# Patient Record
Sex: MALE | Race: WHITE | Employment: OTHER | URBAN - METROPOLITAN AREA
[De-identification: names, ages, dates, MRNs, and addresses within clinical notes are randomized per-mention and may not be internally consistent; named-entity substitution may affect disease eponyms.]

---

## 2019-07-22 ENCOUNTER — HOSPITAL ENCOUNTER (OUTPATIENT)
Dept: CT IMAGING | Age: 69
Discharge: HOME | End: 2019-07-22
Payer: MEDICARE

## 2019-07-22 DIAGNOSIS — N50.1: ICD-10-CM

## 2019-07-22 LAB
GFR AFRICAN AMERICAN: >60
GFR NON-AFRICAN AMERICAN: >60
PERFORMED ON: NORMAL
POC CREATININE: 0.8 MG/DL (ref 0.8–1.3)
POC SAMPLE TYPE: NORMAL

## 2019-07-22 PROCEDURE — 82565 ASSAY OF CREATININE: CPT

## 2019-07-22 PROCEDURE — 6360000004 HC RX CONTRAST MEDICATION: Performed by: INTERNAL MEDICINE

## 2019-07-22 PROCEDURE — 74177 CT ABD & PELVIS W/CONTRAST: CPT

## 2019-07-22 RX ADMIN — IOHEXOL 50 ML: 240 INJECTION, SOLUTION INTRATHECAL; INTRAVASCULAR; INTRAVENOUS; ORAL at 14:19

## 2019-07-22 RX ADMIN — IOPAMIDOL 75 ML: 755 INJECTION, SOLUTION INTRAVENOUS at 14:19

## 2019-09-26 ENCOUNTER — TELEPHONE (OUTPATIENT)
Dept: FAMILY MEDICINE CLINIC | Age: 69
End: 2019-09-26

## 2019-10-16 ENCOUNTER — OFFICE VISIT (OUTPATIENT)
Dept: FAMILY MEDICINE CLINIC | Age: 69
End: 2019-10-16
Payer: MEDICARE

## 2019-10-16 VITALS
WEIGHT: 245.2 LBS | RESPIRATION RATE: 18 BRPM | TEMPERATURE: 98.6 F | DIASTOLIC BLOOD PRESSURE: 62 MMHG | SYSTOLIC BLOOD PRESSURE: 106 MMHG | BODY MASS INDEX: 39.41 KG/M2 | HEART RATE: 106 BPM | OXYGEN SATURATION: 97 % | HEIGHT: 66 IN

## 2019-10-16 DIAGNOSIS — L73.2 HIDRADENITIS: ICD-10-CM

## 2019-10-16 DIAGNOSIS — E78.2 MIXED HYPERLIPIDEMIA: ICD-10-CM

## 2019-10-16 DIAGNOSIS — Z86.010 HISTORY OF COLONOSCOPY WITH POLYPECTOMY: ICD-10-CM

## 2019-10-16 DIAGNOSIS — R73.03 PREDIABETES: ICD-10-CM

## 2019-10-16 DIAGNOSIS — Z23 NEEDS FLU SHOT: ICD-10-CM

## 2019-10-16 DIAGNOSIS — R35.0 BENIGN PROSTATIC HYPERPLASIA WITH URINARY FREQUENCY: ICD-10-CM

## 2019-10-16 DIAGNOSIS — Z98.890 HISTORY OF COLONOSCOPY WITH POLYPECTOMY: ICD-10-CM

## 2019-10-16 DIAGNOSIS — N40.1 BENIGN PROSTATIC HYPERPLASIA WITH URINARY FREQUENCY: ICD-10-CM

## 2019-10-16 DIAGNOSIS — I89.0 LYMPHEDEMA OF GENITALIA: ICD-10-CM

## 2019-10-16 DIAGNOSIS — I10 ESSENTIAL HYPERTENSION: Primary | ICD-10-CM

## 2019-10-16 DIAGNOSIS — Z72.0 TOBACCO USE: ICD-10-CM

## 2019-10-16 PROBLEM — Z86.0100 HISTORY OF COLONOSCOPY WITH POLYPECTOMY: Status: ACTIVE | Noted: 2019-10-16

## 2019-10-16 PROCEDURE — 4004F PT TOBACCO SCREEN RCVD TLK: CPT | Performed by: FAMILY MEDICINE

## 2019-10-16 PROCEDURE — G8417 CALC BMI ABV UP PARAM F/U: HCPCS | Performed by: FAMILY MEDICINE

## 2019-10-16 PROCEDURE — 90653 IIV ADJUVANT VACCINE IM: CPT | Performed by: FAMILY MEDICINE

## 2019-10-16 PROCEDURE — 3017F COLORECTAL CA SCREEN DOC REV: CPT | Performed by: FAMILY MEDICINE

## 2019-10-16 PROCEDURE — G0008 ADMIN INFLUENZA VIRUS VAC: HCPCS | Performed by: FAMILY MEDICINE

## 2019-10-16 PROCEDURE — G8427 DOCREV CUR MEDS BY ELIG CLIN: HCPCS | Performed by: FAMILY MEDICINE

## 2019-10-16 PROCEDURE — 4040F PNEUMOC VAC/ADMIN/RCVD: CPT | Performed by: FAMILY MEDICINE

## 2019-10-16 PROCEDURE — 99204 OFFICE O/P NEW MOD 45 MIN: CPT | Performed by: FAMILY MEDICINE

## 2019-10-16 PROCEDURE — 1123F ACP DISCUSS/DSCN MKR DOCD: CPT | Performed by: FAMILY MEDICINE

## 2019-10-16 PROCEDURE — G8482 FLU IMMUNIZE ORDER/ADMIN: HCPCS | Performed by: FAMILY MEDICINE

## 2019-10-16 RX ORDER — LOVASTATIN 40 MG/1
TABLET ORAL
COMMUNITY
Start: 2019-08-30 | End: 2020-05-14 | Stop reason: SDUPTHER

## 2019-10-16 RX ORDER — CLINDAMYCIN PHOSPHATE 10 MG/G
GEL TOPICAL
Qty: 1 BOTTLE | Refills: 2 | Status: SHIPPED | OUTPATIENT
Start: 2019-10-16 | End: 2019-10-23

## 2019-10-16 RX ORDER — LOSARTAN POTASSIUM 100 MG/1
100 TABLET ORAL
COMMUNITY
Start: 2018-12-10 | End: 2020-01-16 | Stop reason: SDUPTHER

## 2019-10-16 RX ORDER — TAMSULOSIN HYDROCHLORIDE 0.4 MG/1
0.4 CAPSULE ORAL DAILY
COMMUNITY
End: 2020-02-24 | Stop reason: SDUPTHER

## 2019-10-16 ASSESSMENT — ENCOUNTER SYMPTOMS
SORE THROAT: 0
DIARRHEA: 0
SINUS PRESSURE: 0
VOMITING: 0
SHORTNESS OF BREATH: 0
NAUSEA: 0
EYE REDNESS: 0
COUGH: 0
COLOR CHANGE: 0

## 2019-10-16 ASSESSMENT — PATIENT HEALTH QUESTIONNAIRE - PHQ9
2. FEELING DOWN, DEPRESSED OR HOPELESS: 0
SUM OF ALL RESPONSES TO PHQ9 QUESTIONS 1 & 2: 0
SUM OF ALL RESPONSES TO PHQ QUESTIONS 1-9: 0
SUM OF ALL RESPONSES TO PHQ QUESTIONS 1-9: 0
1. LITTLE INTEREST OR PLEASURE IN DOING THINGS: 0

## 2019-10-29 ENCOUNTER — TELEPHONE (OUTPATIENT)
Dept: FAMILY MEDICINE CLINIC | Age: 69
End: 2019-10-29

## 2019-10-29 RX ORDER — METHYLPREDNISOLONE 4 MG/1
TABLET ORAL
Qty: 1 KIT | Refills: 0 | Status: SHIPPED | OUTPATIENT
Start: 2019-10-29 | End: 2019-11-04

## 2019-10-31 ENCOUNTER — OFFICE VISIT (OUTPATIENT)
Dept: FAMILY MEDICINE CLINIC | Age: 69
End: 2019-10-31
Payer: MEDICARE

## 2019-10-31 VITALS
WEIGHT: 244 LBS | HEART RATE: 88 BPM | DIASTOLIC BLOOD PRESSURE: 60 MMHG | RESPIRATION RATE: 12 BRPM | OXYGEN SATURATION: 98 % | SYSTOLIC BLOOD PRESSURE: 120 MMHG | TEMPERATURE: 98 F | BODY MASS INDEX: 39.38 KG/M2

## 2019-10-31 DIAGNOSIS — M10.9 ACUTE GOUT OF RIGHT FOOT, UNSPECIFIED CAUSE: Primary | ICD-10-CM

## 2019-10-31 PROCEDURE — G8417 CALC BMI ABV UP PARAM F/U: HCPCS | Performed by: FAMILY MEDICINE

## 2019-10-31 PROCEDURE — G8482 FLU IMMUNIZE ORDER/ADMIN: HCPCS | Performed by: FAMILY MEDICINE

## 2019-10-31 PROCEDURE — 4040F PNEUMOC VAC/ADMIN/RCVD: CPT | Performed by: FAMILY MEDICINE

## 2019-10-31 PROCEDURE — G8427 DOCREV CUR MEDS BY ELIG CLIN: HCPCS | Performed by: FAMILY MEDICINE

## 2019-10-31 PROCEDURE — 99213 OFFICE O/P EST LOW 20 MIN: CPT | Performed by: FAMILY MEDICINE

## 2019-10-31 PROCEDURE — 1123F ACP DISCUSS/DSCN MKR DOCD: CPT | Performed by: FAMILY MEDICINE

## 2019-10-31 PROCEDURE — 3017F COLORECTAL CA SCREEN DOC REV: CPT | Performed by: FAMILY MEDICINE

## 2019-10-31 PROCEDURE — 4004F PT TOBACCO SCREEN RCVD TLK: CPT | Performed by: FAMILY MEDICINE

## 2019-11-12 ENCOUNTER — TELEPHONE (OUTPATIENT)
Dept: FAMILY MEDICINE CLINIC | Age: 69
End: 2019-11-12

## 2019-11-13 RX ORDER — INDOMETHACIN 50 MG/1
50 CAPSULE ORAL 2 TIMES DAILY WITH MEALS
Qty: 20 CAPSULE | Refills: 3 | Status: SHIPPED | OUTPATIENT
Start: 2019-11-13 | End: 2020-01-28 | Stop reason: SDUPTHER

## 2019-11-25 ENCOUNTER — TELEPHONE (OUTPATIENT)
Dept: FAMILY MEDICINE CLINIC | Age: 69
End: 2019-11-25

## 2019-11-25 ENCOUNTER — OFFICE VISIT (OUTPATIENT)
Dept: FAMILY MEDICINE CLINIC | Age: 69
End: 2019-11-25
Payer: MEDICARE

## 2019-11-25 VITALS
WEIGHT: 237 LBS | TEMPERATURE: 98.2 F | DIASTOLIC BLOOD PRESSURE: 60 MMHG | BODY MASS INDEX: 38.25 KG/M2 | SYSTOLIC BLOOD PRESSURE: 102 MMHG | HEART RATE: 94 BPM | RESPIRATION RATE: 12 BRPM | OXYGEN SATURATION: 96 %

## 2019-11-25 DIAGNOSIS — Z72.0 TOBACCO USE: ICD-10-CM

## 2019-11-25 DIAGNOSIS — J40 BRONCHITIS: Primary | ICD-10-CM

## 2019-11-25 PROBLEM — G47.33 OSA ON CPAP: Status: ACTIVE | Noted: 2019-11-25

## 2019-11-25 PROBLEM — Z99.89 OSA ON CPAP: Status: ACTIVE | Noted: 2019-11-25

## 2019-11-25 PROCEDURE — 3017F COLORECTAL CA SCREEN DOC REV: CPT | Performed by: NURSE PRACTITIONER

## 2019-11-25 PROCEDURE — G8417 CALC BMI ABV UP PARAM F/U: HCPCS | Performed by: NURSE PRACTITIONER

## 2019-11-25 PROCEDURE — 4040F PNEUMOC VAC/ADMIN/RCVD: CPT | Performed by: NURSE PRACTITIONER

## 2019-11-25 PROCEDURE — G8482 FLU IMMUNIZE ORDER/ADMIN: HCPCS | Performed by: NURSE PRACTITIONER

## 2019-11-25 PROCEDURE — 99213 OFFICE O/P EST LOW 20 MIN: CPT | Performed by: NURSE PRACTITIONER

## 2019-11-25 PROCEDURE — G8427 DOCREV CUR MEDS BY ELIG CLIN: HCPCS | Performed by: NURSE PRACTITIONER

## 2019-11-25 PROCEDURE — 4004F PT TOBACCO SCREEN RCVD TLK: CPT | Performed by: NURSE PRACTITIONER

## 2019-11-25 PROCEDURE — 1123F ACP DISCUSS/DSCN MKR DOCD: CPT | Performed by: NURSE PRACTITIONER

## 2019-11-25 RX ORDER — DEXTROMETHORPHAN HYDROBROMIDE AND PROMETHAZINE HYDROCHLORIDE 15; 6.25 MG/5ML; MG/5ML
5 SYRUP ORAL 4 TIMES DAILY PRN
Qty: 140 ML | Refills: 0 | Status: SHIPPED | OUTPATIENT
Start: 2019-11-25 | End: 2019-12-02

## 2019-11-25 RX ORDER — ALBUTEROL SULFATE 90 UG/1
2 AEROSOL, METERED RESPIRATORY (INHALATION) EVERY 4 HOURS PRN
Qty: 1 INHALER | Refills: 0 | Status: SHIPPED | OUTPATIENT
Start: 2019-11-25 | End: 2021-11-09 | Stop reason: ALTCHOICE

## 2019-11-25 RX ORDER — DOXYCYCLINE HYCLATE 100 MG
100 TABLET ORAL 2 TIMES DAILY
Qty: 20 TABLET | Refills: 0 | Status: SHIPPED | OUTPATIENT
Start: 2019-11-25 | End: 2019-12-05

## 2019-11-25 ASSESSMENT — ENCOUNTER SYMPTOMS
SINUS PRESSURE: 0
CHEST TIGHTNESS: 0
WHEEZING: 0
NAUSEA: 0
SHORTNESS OF BREATH: 0
SINUS PAIN: 0
COUGH: 1
DIARRHEA: 0
VOMITING: 0
SORE THROAT: 0
RHINORRHEA: 1

## 2020-01-14 DIAGNOSIS — R73.03 PREDIABETES: ICD-10-CM

## 2020-01-14 DIAGNOSIS — E78.2 MIXED HYPERLIPIDEMIA: ICD-10-CM

## 2020-01-14 DIAGNOSIS — R35.0 BENIGN PROSTATIC HYPERPLASIA WITH URINARY FREQUENCY: ICD-10-CM

## 2020-01-14 DIAGNOSIS — N40.1 BENIGN PROSTATIC HYPERPLASIA WITH URINARY FREQUENCY: ICD-10-CM

## 2020-01-14 DIAGNOSIS — I10 ESSENTIAL HYPERTENSION: ICD-10-CM

## 2020-01-14 LAB
A/G RATIO: 1.8 (ref 1.1–2.2)
ALBUMIN SERPL-MCNC: 4.4 G/DL (ref 3.4–5)
ALP BLD-CCNC: 75 U/L (ref 40–129)
ALT SERPL-CCNC: 11 U/L (ref 10–40)
ANION GAP SERPL CALCULATED.3IONS-SCNC: 16 MMOL/L (ref 3–16)
AST SERPL-CCNC: 11 U/L (ref 15–37)
BILIRUB SERPL-MCNC: 0.4 MG/DL (ref 0–1)
BUN BLDV-MCNC: 12 MG/DL (ref 7–20)
CALCIUM SERPL-MCNC: 9.4 MG/DL (ref 8.3–10.6)
CHLORIDE BLD-SCNC: 104 MMOL/L (ref 99–110)
CHOLESTEROL, FASTING: 138 MG/DL (ref 0–199)
CO2: 20 MMOL/L (ref 21–32)
CREAT SERPL-MCNC: 0.8 MG/DL (ref 0.8–1.3)
GFR AFRICAN AMERICAN: >60
GFR NON-AFRICAN AMERICAN: >60
GLOBULIN: 2.5 G/DL
GLUCOSE BLD-MCNC: 108 MG/DL (ref 70–99)
HDLC SERPL-MCNC: 35 MG/DL (ref 40–60)
LDL CHOLESTEROL CALCULATED: 71 MG/DL
POTASSIUM SERPL-SCNC: 4.5 MMOL/L (ref 3.5–5.1)
PROSTATE SPECIFIC ANTIGEN: 3.96 NG/ML (ref 0–4)
SODIUM BLD-SCNC: 140 MMOL/L (ref 136–145)
TOTAL PROTEIN: 6.9 G/DL (ref 6.4–8.2)
TRIGLYCERIDE, FASTING: 161 MG/DL (ref 0–150)
VLDLC SERPL CALC-MCNC: 32 MG/DL

## 2020-01-15 LAB
ESTIMATED AVERAGE GLUCOSE: 122.6 MG/DL
HBA1C MFR BLD: 5.9 %

## 2020-01-16 ENCOUNTER — OFFICE VISIT (OUTPATIENT)
Dept: FAMILY MEDICINE CLINIC | Age: 70
End: 2020-01-16
Payer: MEDICARE

## 2020-01-16 VITALS
TEMPERATURE: 97.4 F | OXYGEN SATURATION: 98 % | DIASTOLIC BLOOD PRESSURE: 60 MMHG | HEART RATE: 68 BPM | WEIGHT: 228.4 LBS | RESPIRATION RATE: 18 BRPM | SYSTOLIC BLOOD PRESSURE: 112 MMHG | BODY MASS INDEX: 36.71 KG/M2 | HEIGHT: 66 IN

## 2020-01-16 PROBLEM — E66.812 CLASS 2 SEVERE OBESITY DUE TO EXCESS CALORIES WITH SERIOUS COMORBIDITY AND BODY MASS INDEX (BMI) OF 36.0 TO 36.9 IN ADULT: Status: ACTIVE | Noted: 2020-01-16

## 2020-01-16 PROBLEM — E66.01 CLASS 2 SEVERE OBESITY DUE TO EXCESS CALORIES WITH SERIOUS COMORBIDITY AND BODY MASS INDEX (BMI) OF 36.0 TO 36.9 IN ADULT (HCC): Status: ACTIVE | Noted: 2020-01-16

## 2020-01-16 PROCEDURE — G0296 VISIT TO DETERM LDCT ELIG: HCPCS | Performed by: FAMILY MEDICINE

## 2020-01-16 PROCEDURE — 90732 PPSV23 VACC 2 YRS+ SUBQ/IM: CPT | Performed by: FAMILY MEDICINE

## 2020-01-16 PROCEDURE — G8417 CALC BMI ABV UP PARAM F/U: HCPCS | Performed by: FAMILY MEDICINE

## 2020-01-16 PROCEDURE — 99406 BEHAV CHNG SMOKING 3-10 MIN: CPT | Performed by: FAMILY MEDICINE

## 2020-01-16 PROCEDURE — 99214 OFFICE O/P EST MOD 30 MIN: CPT | Performed by: FAMILY MEDICINE

## 2020-01-16 PROCEDURE — G8427 DOCREV CUR MEDS BY ELIG CLIN: HCPCS | Performed by: FAMILY MEDICINE

## 2020-01-16 PROCEDURE — 1123F ACP DISCUSS/DSCN MKR DOCD: CPT | Performed by: FAMILY MEDICINE

## 2020-01-16 PROCEDURE — G0438 PPPS, INITIAL VISIT: HCPCS | Performed by: FAMILY MEDICINE

## 2020-01-16 PROCEDURE — G0009 ADMIN PNEUMOCOCCAL VACCINE: HCPCS | Performed by: FAMILY MEDICINE

## 2020-01-16 PROCEDURE — 4004F PT TOBACCO SCREEN RCVD TLK: CPT | Performed by: FAMILY MEDICINE

## 2020-01-16 PROCEDURE — 4040F PNEUMOC VAC/ADMIN/RCVD: CPT | Performed by: FAMILY MEDICINE

## 2020-01-16 PROCEDURE — 3017F COLORECTAL CA SCREEN DOC REV: CPT | Performed by: FAMILY MEDICINE

## 2020-01-16 PROCEDURE — G8482 FLU IMMUNIZE ORDER/ADMIN: HCPCS | Performed by: FAMILY MEDICINE

## 2020-01-16 PROCEDURE — G0447 BEHAVIOR COUNSEL OBESITY 15M: HCPCS | Performed by: FAMILY MEDICINE

## 2020-01-16 RX ORDER — PREDNISONE 10 MG/1
TABLET ORAL
Qty: 20 TABLET | Refills: 0 | Status: SHIPPED | OUTPATIENT
Start: 2020-01-16 | End: 2020-01-26

## 2020-01-16 RX ORDER — LOSARTAN POTASSIUM 50 MG/1
50 TABLET ORAL DAILY
Qty: 90 TABLET | Refills: 1 | Status: SHIPPED | OUTPATIENT
Start: 2020-01-16 | End: 2020-11-02

## 2020-01-16 ASSESSMENT — PATIENT HEALTH QUESTIONNAIRE - PHQ9
SUM OF ALL RESPONSES TO PHQ QUESTIONS 1-9: 0
SUM OF ALL RESPONSES TO PHQ QUESTIONS 1-9: 0

## 2020-01-16 ASSESSMENT — ENCOUNTER SYMPTOMS
WHEEZING: 0
COUGH: 1
SHORTNESS OF BREATH: 0

## 2020-01-16 NOTE — PROGRESS NOTES
Medicare Annual Wellness Visit  Name: Grant Cronin Date: 2020   MRN: <[id number]> Sex: Male   Age: 77 y.o. Ethnicity: Non-/Non    : 1950 Race: Leroy Johnson is here for Medicare AWV (Pt is here for his AWV ) and Shoulder Pain (pt c/o shoulder pain and elbow pain right and left side )    Screenings for behavioral, psychosocial and functional/safety risks, and cognitive dysfunction are all negative except as indicated below. These results, as well as other patient data from the [address] form, are documented in Flowsheets linked to this Encounter. Allergies   Allergen Reactions    Allopurinol Rash         Prior to Visit Medications    Medication Sig Taking? Authorizing Provider   losartan (COZAAR) 50 MG tablet Take 1 tablet by mouth daily Yes Jose Bernard MD   predniSONE (DELTASONE) 10 MG tablet Take 4 tabs for 2 days, then 3 tabs for 2 days, then 2 tabs for 2 days, then 1 tab for 2 days.  Yes Amy Huerta MD   albuterol sulfate HFA (VENTOLIN HFA) 108 (90 Base) MCG/ACT inhaler Inhale 2 puffs into the lungs every 4 hours as needed for Wheezing or Shortness of Breath Yes JAYRO Suarez - CNP   lovastatin (MEVACOR) 40 MG tablet  Yes Historical Provider, MD   tamsulosin (FLOMAX) 0.4 MG capsule Take 0.4 mg by mouth daily Yes Historical Provider, MD   indomethacin (INDOCIN) 50 MG capsule Take 1 capsule by mouth 2 times daily (with meals) for 10 days  Patient not taking: Reported on 2019  Jose Bernard MD         Past Medical History:   Diagnosis Date    Hydradenitis     Lymphedema        Past Surgical History:   Procedure Laterality Date    BACK SURGERY           Family History   Problem Relation Age of Onset    Heart Disease Mother     Rheum Arthritis Mother        CareTeam (Including outside providers/suppliers regularly involved in providing care):   Patient Care Team:  Jose Bernard MD as PCP - General (Family Medicine)  Jose Bernard MD as PCP - [facility] Empaneled Provider    Wt Readings from Last 3 Encounters:   01/16/20 228 lb 6.4 oz (103.6 kg)   11/25/19 237 lb (107.5 kg)   10/31/19 244 lb (110.7 kg)     Vitals:    01/16/20 1454   BP: 112/60   Site: Left Upper Arm   Position: Sitting   Cuff Size: Large Adult   Pulse: 68   Resp: 18   Temp: 97.4 °F (36.3 °C)   TempSrc: Tympanic   SpO2: 98%   Weight: 228 lb 6.4 oz (103.6 kg)   Height: 5' 6\" (1.676 m)     Body mass index is 36.86 kg/m². Based upon direct observation of the patient, evaluation of cognition reveals recent and remote memory intact. Patient's complete Health Risk Assessment and screening values have been reviewed and are found in Flowsheets. The following problems were reviewed today and where indicated follow up appointments were made and/or referrals ordered. Positive Risk Factor Screenings with Interventions:     Substance Abuse:  Social History     Tobacco History     Smoking Status  Current Every Day Smoker Smoking Start Date  1/1/1967 Smoking Frequency  1.5 packs/day for 50 years (76 pk yrs) Smoking Tobacco Type  Cigarettes    Smokeless Tobacco Use  Never Used          Alcohol History     Alcohol Use Status  Yes          Drug Use     Drug Use Status  Never          Sexual Activity     Sexually Active  Not Asked                  Substance Abuse Interventions:  · Tobacco abuse:  tobacco cessation tips and resources provided    Health Habits/Nutrition:     Body mass index is 36.86 kg/m².   Health Habits/Nutrition Interventions:  · Nutritional issues:  educational materials for healthy, well-balanced diet provided    Personalized Preventive Plan   Current Health Maintenance Status  Immunization History   Administered Date(s) Administered    Influenza, Triv, inactivated, subunit, adjuvanted, IM (Fluad 65 yrs and older) 10/16/2019    Pneumococcal Conjugate 13-valent ([id number]) 10/14/2017    Pneumococcal Polysaccharide ([id number]) 01/16/2020    Td, unspecified formulation 01/06/1997    Tdap (Boostrix, Adacel) 03/19/2015    Zoster Live (Zostavax) 01/15/2014        Health Maintenance   Topic Date Due    Hepatitis C screen  1950    Low dose CT lung screening  08/04/2006    Shingles Vaccine (2 of 3) 03/12/2014    Annual Wellness Visit (AWV)  10/24/2019    A1C test (Diabetic or Prediabetic)  01/14/2021    Lipid screen  01/14/2021    Potassium monitoring  01/14/2021    Creatinine monitoring  01/14/2021    DTaP/Tdap/Td vaccine (2 - Td) 03/19/2025    Colon cancer screen colonoscopy  08/10/2028    Flu vaccine  Completed    Pneumococcal 65+ years Vaccine  Completed    AAA screen  Completed     Recommendations for Preventive Services Due: see orders and patient instructions/AVS.  . Recommended screening schedule for the next 5-10 years is provided to the patient in written form: see Patient Instructions/AVS.    Juan Carlos Tanner was seen today for medicare awv and shoulder pain. Diagnoses and all orders for this visit:    Routine general medical examination at a health care facility    Essential hypertension  -     losartan (COZAAR) 50 MG tablet; Take 1 tablet by mouth daily    Class 2 severe obesity due to excess calories with serious comorbidity and body mass index (BMI) of 36.0 to 36.9 in adult [facility])  -     [place] Behavior  obesity 15m []    Light headedness    Cough    Elbow swelling, right  -     predniSONE (DELTASONE) 10 MG tablet; Take 4 tabs for 2 days, then 3 tabs for 2 days, then 2 tabs for 2 days, then 1 tab for 2 days. Lymphedema of genitalia  -     AFL - Ofelia Zapien DO, [facility], [facility]    Tobacco use    Personal history of tobacco use, presenting hazards to health  -     [place] TOBACCO USE CESSATION INTERMEDIATE 3-10 MINUTES [34945]    Personal history of tobacco use  -     [place] VISIT TO DISCUSS LUNG CA SCREEN W LDCT []  -     CT Lung Screening;  Future    Need for prophylactic vaccination against Streptococcus pneumoniae (pneumococcus)  -     Pneumococcal polysaccharide vaccine 23-valent PPSV23

## 2020-01-16 NOTE — PATIENT INSTRUCTIONS
Learning About Benefits From Quitting Smoking  How does quitting smoking make you healthier? If you're thinking about quitting smoking, you may have a few reasons to be smoke-free. Your health may be one of them. · When you quit smoking, you lower your risks for cancer, lung disease, heart attack, stroke, blood vessel disease, and blindness from macular degeneration. · When you're smoke-free, you get sick less often, and you heal faster. You are less likely to get colds, flu, bronchitis, and pneumonia. · As a nonsmoker, you may find that your mood is better and you are less stressed. When and how will you feel healthier? Quitting has real health benefits that start from day 1 of being smoke-free. And the longer you stay smoke-free, the healthier you get and the better you feel. The first hours  · After just 20 minutes, your blood pressure and heart rate go down. That means there's less stress on your heart and blood vessels. · Within 12 hours, the level of carbon monoxide in your blood drops back to normal. That makes room for more oxygen. With more oxygen in your body, you may notice that you have more energy than when you smoked. After 2 weeks  · Your lungs start to work better. · Your risk of heart attack starts to drop. After 1 month  · When your lungs are clear, you cough less and breathe deeper, so it's easier to be active. · Your sense of taste and smell return. That means you can enjoy food more than you have since you started smoking. Over the years  · After 1 year, your risk of heart disease is half what it would be if you kept smoking. · After 5 years, your risk of stroke starts to shrink. Within a few years after that, it's about the same as if you'd never smoked. · After 10 years, your risk of dying from lung cancer is cut by about half. And your risk for many other types of cancer is lower too. How would quitting help others in your life?   When you quit smoking, you improve the health of everyone who now breathes in your smoke. · Their heart, lung, and cancer risks drop, much like yours. · They are sick less. For babies and small children, living smoke-free means they're less likely to have ear infections, pneumonia, and bronchitis. · If you're a woman who is or will be pregnant someday, quitting smoking means a healthier . · Children who are close to you are less likely to become adult smokers. Where can you learn more? Go to https://[employer]. org and sign in to your [employer] account. Enter [phone] in the Webspy box to learn more about \"Learning About Benefits From Quitting Smoking. \"     If you do not have an account, please click on the \"Sign Up Now\" link. Current as of: 2019  Content Version: 12.3  © 5704-8887 Healthwise, [employer]. Care instructions adapted under license by [facility] ([facility]). If you have questions about a medical condition or this instruction, always ask your healthcare professional. [place] [address] any warranty or liability for your use of this information. Personalized Preventive Plan for Briana Mcqueen - 2020  Medicare offers a range of preventive health benefits. Some of the tests and screenings are paid in full while other may be subject to a deductible, co-insurance, and/or copay. Some of these benefits include a comprehensive review of your medical history including lifestyle, illnesses that may run in your family, and various assessments and screenings as appropriate. After reviewing your medical record and screening and assessments performed today your provider may have ordered immunizations, labs, imaging, and/or referrals for you. A list of these orders (if applicable) as well as your Preventive Care list are included within your After Visit Summary for your review.     Other Preventive Recommendations:    · A preventive eye exam performed by an eye specialist is

## 2020-01-16 NOTE — PROGRESS NOTES
Relationship status: None    Intimate partner violence:     Fear of current or ex partner: None     Emotionally abused: None     Physically abused: None     Forced sexual activity: None   Other Topics Concern    None   Social History Narrative    Originally from the area    Retired from [employer] work    Two boys, 6 grandchildren    Lives with wife, Ivory Sjogren     Allergies   Allergen Reactions    Allopurinol Rash       LABS:  Lab Results   Component Value Date    GLUCOSE 108 (H) 01/14/2020     Lab Results   Component Value Date     01/14/2020    K 4.5 01/14/2020    CREATININE 0.8 01/14/2020     LDL Calculated   Date Value Ref Range Status   01/14/2020 71 <100 mg/dL Final     HDL   Date Value Ref Range Status   01/14/2020 35 (L) 40 - 60 mg/dL Final   No results found for: TRIG  Lab Results   Component Value Date    ALT 11 01/14/2020    AST 11 (L) 01/14/2020    ALKPHOS 75 01/14/2020    BILITOT 0.4 01/14/2020   No results found for: WBC, HGB, HCT, MCV, PLT  No results found for: TSH  Lab Results   Component Value Date    LABA1C 5.9 01/14/2020     Lab Results   Component Value Date    PSA 3.96 01/14/2020         PHYSICAL EXAM  /60 (Site: Left Upper Arm, Position: Sitting, Cuff Size: Large Adult)   Pulse 68   Temp 97.4 °F (36.3 °C) (Tympanic)   Resp 18   Ht 5' 6\" (1.676 m)   Wt 228 lb 6.4 oz (103.6 kg)   SpO2 98%   BMI 36.86 kg/m²     BP Readings from Last 5 Encounters:   01/16/20 112/60   11/25/19 102/60   10/31/19 120/60   10/16/19 106/62       Wt Readings from Last 5 Encounters:   01/16/20 228 lb 6.4 oz (103.6 kg)   11/25/19 237 lb (107.5 kg)   10/31/19 244 lb (110.7 kg)   10/16/19 245 lb 3.2 oz (111.2 kg)         Physical Exam  Constitutional:       Appearance: He is well-developed. HENT:      Head: Normocephalic and atraumatic. Neck:      Musculoskeletal: Normal range of motion. Cardiovascular:      Rate and Rhythm: Normal rate and regular rhythm.       Heart sounds: Normal heart sounds. Pulmonary:      Effort: Pulmonary effort is normal.      Breath sounds: Normal breath sounds. Musculoskeletal: Normal range of motion. Comments: L elbow joint with mild swelling without erythema. +TTP   Skin:     General: Skin is warm and dry. Findings: No rash. Neurological:      Mental Status: He is alert and oriented to person, place, and time. Deep Tendon Reflexes: Reflexes are normal and symmetric. ASSESSMENT/PLAN:  1. Routine general medical examination at a health care facility    2. Essential hypertension  He has been lightheaded and his blood pressure is low normal so we will decrease his losartan from 100 to 50 mg  - losartan (COZAAR) 50 MG tablet; Take 1 tablet by mouth daily  Dispense: 90 tablet; Refill: 1    3. Class 2 severe obesity due to excess calories with serious comorbidity and body mass index (BMI) of 36.0 to 36.9 in [facility])  We reviewed this in detail. Congratulated patient on 17 pound weight loss over the last 3 months  - [place] Behavior  obesity 15m []    4. Light headedness  This is due to his blood pressure medication likely in light of his weight loss needs less medicine so we will decrease the dose    5. Cough  He has a normal lung exam.  This is likely post viral in nature. It is improving. I asked him to contact me if it persists for more than 4 to 6 weeks beyond today    6. Elbow swelling, right  It is unclear if this is gout related to a minor trauma. It does not appear to be a true bursitis as it is more proximal.  Will use prednisone. He does not have symptoms concerning for infectious cause, however I did ask him to contact us if symptoms worsen or fail to improve on the prednisone  - predniSONE (DELTASONE) 10 MG tablet; Take 4 tabs for 2 days, then 3 tabs for 2 days, then 2 tabs for 2 days, then 1 tab for 2 days. Dispense: 20 tablet; Refill: 0    7.  Lymphedema of genitalia  - AFL - Rupali, Cedrick Victor DO, [facility], [facility]    8. Tobacco use  Discussed cessation at length. He has been on patches in the past.  He is not ready today    9. Personal history of tobacco use, presenting hazards to health  After reviewing potential risks and benefits of lung cancer screening including false positives he would like to opt for screening  - [place] TOBACCO USE CESSATION INTERMEDIATE 3-10 MINUTES [58384]    10. Personal history of tobacco use  - [place] VISIT TO DISCUSS LUNG CA SCREEN W LDCT []  - CT Lung Screening; Future    11. Need for prophylactic vaccination against Streptococcus pneumoniae (pneumococcus)  - Pneumococcal polysaccharide vaccine 23-valent PPSV23      Return in about 6 months (around 7/16/2020) for htn f/u.

## 2020-01-27 ENCOUNTER — TELEPHONE (OUTPATIENT)
Dept: FAMILY MEDICINE CLINIC | Age: 70
End: 2020-01-27

## 2020-01-27 NOTE — TELEPHONE ENCOUNTER
Pt called stating that he was seen last week for a physical  At that appt he had a swollen elbow   Now knuckle  is swollen and elbow still hurts   He was given prednisone 10mg  He is done taking that.   It only helped a little but the pain is back  Is there something else that the pt can take to help with this  Pharm is confirmed at [facility] on [address]    Please advise

## 2020-01-27 NOTE — TELEPHONE ENCOUNTER
If not improving he would likely benefit from an eval from Ortho. Since the prednisone didn't help I'm not sure further medicine will do the trick. Referral for Dr. Efren Nieves placed. Thanks.

## 2020-01-28 ENCOUNTER — HOSPITAL ENCOUNTER (OUTPATIENT)
Dept: CT IMAGING | Age: 70
Discharge: HOME | End: 2020-01-28
Payer: MEDICARE

## 2020-01-28 ENCOUNTER — OFFICE VISIT (OUTPATIENT)
Dept: ORTHOPEDIC SURGERY | Age: 70
End: 2020-01-28
Payer: MEDICARE

## 2020-01-28 VITALS
DIASTOLIC BLOOD PRESSURE: 61 MMHG | HEIGHT: 66 IN | SYSTOLIC BLOOD PRESSURE: 107 MMHG | HEART RATE: 81 BPM | WEIGHT: 225 LBS | TEMPERATURE: 98.9 F | BODY MASS INDEX: 36.16 KG/M2

## 2020-01-28 DIAGNOSIS — M79.644 PAIN OF FINGER OF RIGHT HAND: ICD-10-CM

## 2020-01-28 LAB
BASOPHILS ABSOLUTE: 0.1 K/UL (ref 0–0.2)
BASOPHILS RELATIVE PERCENT: 0.4 %
EOSINOPHILS ABSOLUTE: 0.2 K/UL (ref 0–0.6)
EOSINOPHILS RELATIVE PERCENT: 1 %
HCT VFR BLD CALC: 41 % (ref 40.5–52.5)
HEMOGLOBIN: 13.3 G/DL (ref 13.5–17.5)
LYMPHOCYTES ABSOLUTE: 3.8 K/UL (ref 1–5.1)
LYMPHOCYTES RELATIVE PERCENT: 24.9 %
MCH RBC QN AUTO: 28.7 PG (ref 26–34)
MCHC RBC AUTO-ENTMCNC: 32.6 G/DL (ref 31–36)
MCV RBC AUTO: 88.2 FL (ref 80–100)
MONOCYTES ABSOLUTE: 1 K/UL (ref 0–1.3)
MONOCYTES RELATIVE PERCENT: 6.7 %
NEUTROPHILS ABSOLUTE: 10.2 K/UL (ref 1.7–7.7)
NEUTROPHILS RELATIVE PERCENT: 67 %
PDW BLD-RTO: 15.9 % (ref 12.4–15.4)
PLATELET # BLD: 260 K/UL (ref 135–450)
PMV BLD AUTO: 9.6 FL (ref 5–10.5)
RBC # BLD: 4.64 M/UL (ref 4.2–5.9)
URIC ACID, SERUM: 7.4 MG/DL (ref 3.5–7.2)
WBC # BLD: 15.3 K/UL (ref 4–11)

## 2020-01-28 PROCEDURE — G0297 LDCT FOR LUNG CA SCREEN: HCPCS

## 2020-01-28 PROCEDURE — 3017F COLORECTAL CA SCREEN DOC REV: CPT | Performed by: PHYSICIAN ASSISTANT

## 2020-01-28 PROCEDURE — G8417 CALC BMI ABV UP PARAM F/U: HCPCS | Performed by: PHYSICIAN ASSISTANT

## 2020-01-28 PROCEDURE — G8482 FLU IMMUNIZE ORDER/ADMIN: HCPCS | Performed by: PHYSICIAN ASSISTANT

## 2020-01-28 PROCEDURE — 1123F ACP DISCUSS/DSCN MKR DOCD: CPT | Performed by: PHYSICIAN ASSISTANT

## 2020-01-28 PROCEDURE — G8427 DOCREV CUR MEDS BY ELIG CLIN: HCPCS | Performed by: PHYSICIAN ASSISTANT

## 2020-01-28 PROCEDURE — 4004F PT TOBACCO SCREEN RCVD TLK: CPT | Performed by: PHYSICIAN ASSISTANT

## 2020-01-28 PROCEDURE — 99202 OFFICE O/P NEW SF 15 MIN: CPT | Performed by: PHYSICIAN ASSISTANT

## 2020-01-28 PROCEDURE — 4040F PNEUMOC VAC/ADMIN/RCVD: CPT | Performed by: PHYSICIAN ASSISTANT

## 2020-01-28 RX ORDER — INDOMETHACIN 50 MG/1
50 CAPSULE ORAL 2 TIMES DAILY WITH MEALS
Qty: 20 CAPSULE | Refills: 3 | Status: SHIPPED | OUTPATIENT
Start: 2020-01-28 | End: 2020-04-15

## 2020-01-29 PROBLEM — M10.021 ACUTE IDIOPATHIC GOUT OF RIGHT ELBOW: Status: ACTIVE | Noted: 2020-01-29

## 2020-01-29 PROBLEM — M79.644 PAIN OF FINGER OF RIGHT HAND: Status: ACTIVE | Noted: 2020-01-29

## 2020-01-29 PROBLEM — M10.041 ACUTE IDIOPATHIC GOUT OF RIGHT HAND: Status: ACTIVE | Noted: 2020-01-29

## 2020-01-29 PROBLEM — M25.521 RIGHT ELBOW PAIN: Status: ACTIVE | Noted: 2020-01-29

## 2020-01-29 NOTE — PROGRESS NOTES
lesions found of the injured extremity. Vascular exam shows normal  And good capillary refill bilaterally. Sensation is subjectively normal in the whole hand. Digits are normally sensate. X Rays: performed in the office today:   AP and Lateral right Elbow:  No acute fracture or dislocation noted. Mild soft tissue swelling noted in the posterior aspect of the elbow. AP, Lateral and Oblique of the Right Fingers:  Radiographs demonstrate normal bony alignment of the hand and fingers. There is no radiographic evidence of a fracture or dislocation. Additional Tests reviewed: none  Additional Outside Records reviewed: none    Diagnosis:       ICD-10-CM    1. Right elbow pain M25.521 XR ELBOW RIGHT (2 VIEWS)   2. Pain of finger of right hand M79.644 XR ELBOW RIGHT (2 VIEWS)     XR FINGER RIGHT (MIN 2 VIEWS)     CBC Auto Differential     URIC ACID   3. Acute idiopathic gout of right elbow M10.021    4. Acute idiopathic gout of right hand M10.041         Assessment and Plan:       Right elbow pain and swelling with mild erythema, right hand pain, swelling and mild erythema most likely related to acute gouty flareup. Suspicion for infection is low. Temporary improvement with prednisone. If infectious process, I would expect symptoms to worsen while on prednisone. He does have a personal history for gout. The natural history of the patient's diagnosis as well as the treatment options were discussed in full and questions were answered. Risks and benefits of the treatment options also reviewed in detail. He will be placed on Indocin 50 mg twice daily. Elevation of the elbow and hand. May use moist heat. Obtain CBC and uric acid level. Follow Up: 2-3 days. Call or return to clinic prn if these symptoms worsen or fail to improve as anticipated. Addendum: Patient was seen in the late afternoon on 1/28/2020 and was prescribed Indocin for possible gout.   I called and reviewed patient symptoms this morning on 1/29/2020 and he states he feels symptoms have improved with 1 dose of the Indocin. Less swelling and less discomfort. Still has not had any fevers or chills. Results for Serenity Orantes (MRN <[id number]>) as of 1/29/2020 08:45   Ref. Range 1/28/2020 15:26   WBC Latest Ref Range: 4.0 - 11.0 K/uL 15.3 (H)   RBC Latest Ref Range: 4.20 - 5.90 M/uL 4.64   Hemoglobin Quant Latest Ref Range: 13.5 - 17.5 g/dL 13.3 (L)   Hematocrit Latest Ref Range: 40.5 - 52.5 % 41.0   MCV Latest Ref Range: 80.0 - 100.0 fL 88.2   MCH Latest Ref Range: 26.0 - 34.0 pg 28.7   MCHC Latest Ref Range: 31.0 - 36.0 g/dL 32.6   MPV Latest Ref Range: 5.0 - 10.5 fL 9.6   RDW Latest Ref Range: 12.4 - 15.4 % 15.9 (H)   Platelet Count Latest Ref Range: 135 - 450 K/uL 260   Neutrophils % Latest Units: % 67.0   Lymphocyte % Latest Units: % 24.9   Monocytes % Latest Units: % 6.7   Eosinophils % Latest Units: % 1.0   Basophils % Latest Units: % 0.4   Neutrophils Absolute Latest Ref Range: 1.7 - 7.7 K/uL 10.2 (H)   Lymphocytes Absolute Latest Ref Range: 1.0 - 5.1 K/uL 3.8   Monocytes Absolute Latest Ref Range: 0.0 - 1.3 K/uL 1.0   Eosinophils Absolute Latest Ref Range: 0.0 - 0.6 K/uL 0.2   Basophils Absolute Latest Ref Range: 0.0 - 0.2 K/uL 0.1   Results for Serenity Orantes (MRN <[id number]>) as of 1/29/2020 08:45   Ref. Range 1/28/2020 15:26   Uric Acid, Serum Latest Ref Range: 3.5 - 7.2 mg/dL 7.4 (H)     Patient will continue with Indocin twice daily with GI precautions. He is to call the office if his symptoms worsen. Otherwise follow-up as scheduled on Friday, 1/31/2020.

## 2020-01-31 ENCOUNTER — OFFICE VISIT (OUTPATIENT)
Dept: ORTHOPEDIC SURGERY | Age: 70
End: 2020-01-31
Payer: MEDICARE

## 2020-01-31 VITALS
DIASTOLIC BLOOD PRESSURE: 78 MMHG | TEMPERATURE: 97.7 F | SYSTOLIC BLOOD PRESSURE: 142 MMHG | WEIGHT: 225 LBS | HEIGHT: 66 IN | BODY MASS INDEX: 36.16 KG/M2 | HEART RATE: 85 BPM

## 2020-01-31 PROCEDURE — 3017F COLORECTAL CA SCREEN DOC REV: CPT | Performed by: PHYSICIAN ASSISTANT

## 2020-01-31 PROCEDURE — 1123F ACP DISCUSS/DSCN MKR DOCD: CPT | Performed by: PHYSICIAN ASSISTANT

## 2020-01-31 PROCEDURE — G8417 CALC BMI ABV UP PARAM F/U: HCPCS | Performed by: PHYSICIAN ASSISTANT

## 2020-01-31 PROCEDURE — G8482 FLU IMMUNIZE ORDER/ADMIN: HCPCS | Performed by: PHYSICIAN ASSISTANT

## 2020-01-31 PROCEDURE — G8427 DOCREV CUR MEDS BY ELIG CLIN: HCPCS | Performed by: PHYSICIAN ASSISTANT

## 2020-01-31 PROCEDURE — 99212 OFFICE O/P EST SF 10 MIN: CPT | Performed by: PHYSICIAN ASSISTANT

## 2020-01-31 PROCEDURE — 4040F PNEUMOC VAC/ADMIN/RCVD: CPT | Performed by: PHYSICIAN ASSISTANT

## 2020-01-31 PROCEDURE — 4004F PT TOBACCO SCREEN RCVD TLK: CPT | Performed by: PHYSICIAN ASSISTANT

## 2020-01-31 NOTE — PROGRESS NOTES
joint. Hand and elbow swelling significantly improved. X Rays: not performed in the office today:     Diagnosis:        ICD-10-CM    1. Right elbow pain M25.521    2. Acute idiopathic gout of right elbow M10.021    3. Acute idiopathic gout of right hand M10.041         Assessment/ Plan:      Improving hand and elbow pain presumed to be a gout fare up. Doubt infection as his symptoms would not have improved without antibiotics. The natural history of the patient's diagnosis as well as the treatment options were discussed in full and questions were answered. Risks and benefits of the treatment options also reviewed in detail. Continue with the indocin with GI precautions until pain/ index finger swelling resolves. Follow Up:   Call or return to clinic prn if these symptoms worsen or fail to improve as anticipated.

## 2020-02-05 ENCOUNTER — TELEPHONE (OUTPATIENT)
Dept: FAMILY MEDICINE CLINIC | Age: 70
End: 2020-02-05

## 2020-02-05 RX ORDER — AZITHROMYCIN 250 MG/1
TABLET, FILM COATED ORAL
Qty: 6 TABLET | Refills: 0 | Status: SHIPPED | OUTPATIENT
Start: 2020-02-05 | End: 2020-04-15

## 2020-02-05 NOTE — TELEPHONE ENCOUNTER
Pt was seen a month ago and spoke to Dr. Echevarria Coverjorge about a cough.   It was diagnosed as viral  Pt still has the cough, bringing up clear-milky white phlegm  He almost chokes on the phlegm- its gagging  WOP is wondering if a zpak can be called in for him   Pharm [facility] on [address]    Please advise

## 2020-02-24 RX ORDER — TAMSULOSIN HYDROCHLORIDE 0.4 MG/1
0.4 CAPSULE ORAL DAILY
Qty: 90 CAPSULE | Refills: 1 | Status: SHIPPED | OUTPATIENT
Start: 2020-02-24 | End: 2020-08-27

## 2020-02-24 RX ORDER — TAMSULOSIN HYDROCHLORIDE 0.4 MG/1
0.4 CAPSULE ORAL DAILY
OUTPATIENT
Start: 2020-02-24

## 2020-04-13 ENCOUNTER — TELEPHONE (OUTPATIENT)
Dept: FAMILY MEDICINE CLINIC | Age: 70
End: 2020-04-13

## 2020-04-15 ENCOUNTER — TELEPHONE (OUTPATIENT)
Dept: FAMILY MEDICINE CLINIC | Age: 70
End: 2020-04-15

## 2020-04-15 ENCOUNTER — VIRTUAL VISIT (OUTPATIENT)
Dept: FAMILY MEDICINE CLINIC | Age: 70
End: 2020-04-15
Payer: MEDICARE

## 2020-04-15 VITALS — BODY MASS INDEX: 34.7 KG/M2 | TEMPERATURE: 95.6 F | WEIGHT: 215 LBS

## 2020-04-15 PROCEDURE — G8427 DOCREV CUR MEDS BY ELIG CLIN: HCPCS | Performed by: FAMILY MEDICINE

## 2020-04-15 PROCEDURE — 99214 OFFICE O/P EST MOD 30 MIN: CPT | Performed by: FAMILY MEDICINE

## 2020-04-15 PROCEDURE — 1123F ACP DISCUSS/DSCN MKR DOCD: CPT | Performed by: FAMILY MEDICINE

## 2020-04-15 PROCEDURE — 3017F COLORECTAL CA SCREEN DOC REV: CPT | Performed by: FAMILY MEDICINE

## 2020-04-15 PROCEDURE — 4040F PNEUMOC VAC/ADMIN/RCVD: CPT | Performed by: FAMILY MEDICINE

## 2020-04-15 RX ORDER — FEBUXOSTAT 40 MG/1
40 TABLET, FILM COATED ORAL DAILY
Qty: 30 TABLET | Refills: 2 | Status: SHIPPED | OUTPATIENT
Start: 2020-04-15 | End: 2020-06-19

## 2020-04-15 RX ORDER — DOXYCYCLINE HYCLATE 100 MG
100 TABLET ORAL DAILY
Qty: 30 TABLET | Refills: 0 | Status: SHIPPED | OUTPATIENT
Start: 2020-04-15 | End: 2020-05-13 | Stop reason: SDUPTHER

## 2020-04-15 RX ORDER — PREDNISONE 10 MG/1
TABLET ORAL
Qty: 17 TABLET | Refills: 0 | Status: SHIPPED | OUTPATIENT
Start: 2020-04-15 | End: 2021-01-25

## 2020-04-15 RX ORDER — NAPROXEN 375 MG/1
375 TABLET ORAL 2 TIMES DAILY WITH MEALS
Qty: 60 TABLET | Refills: 2 | Status: SHIPPED | OUTPATIENT
Start: 2020-04-15 | End: 2020-09-15

## 2020-04-15 NOTE — TELEPHONE ENCOUNTER
Called pt to discuss coupon which will bring cost down to $58 . He is OK with this.  Coupon faxed to [facility]

## 2020-04-15 NOTE — PROGRESS NOTES
4/15/2020    This is a 77 y.o. male   Chief Complaint   Patient presents with    Other     gout 2 weeks taking anti inflamatory. improving but still sore. HPI   Pt would like to discuss possible gout  - this started about 2 weeks ago  - location is the inside part of his L ankle. Area got swollen, tender, and painful. No known inciting event or injury  - he has been taking indomethacin during this time and it is improving some but still with some residual soreness and swelling. Overall this is taking longer than his typical gout flare to clear up. Bothers him when trying to get to sleep at night. Used to get 1-2 flareups of gout per year but now happening more frequently. Typically feet/ankle although has had a case in his elbow before as well. He quit drinking alcohol. He tries to avoid foods that flare it up    Hidradenitis  -Is been a chronic issue with boils mostly in the groin but sometimes in the axilla as well. This is been bothering him more recently. In the past he was on oral antibiotics which he felt were more helpful than the topical ointments.     Review of Systems   As per HPI, otherwise negative    Past Medical History:   Diagnosis Date    Hydradenitis     Lymphedema        Past Surgical History:   Procedure Laterality Date    BACK SURGERY  2002       Family History   Problem Relation Age of Onset    Heart Disease Mother     Rheum Arthritis Mother        Current Outpatient Medications   Medication Sig Dispense Refill    predniSONE (DELTASONE) 10 MG tablet Take 4 tabs by mouth daily for 2 days, 3 tabs for 2 days, 2 tabs for 1 day, 1 tab for 2 day 17 tablet 0    febuxostat (ULORIC) 40 MG TABS tablet Take 1 tablet by mouth daily 30 tablet 2    naproxen (NAPROSYN) 375 MG tablet Take 1 tablet by mouth 2 times daily (with meals) 60 tablet 2    doxycycline hyclate (VIBRA-TABS) 100 MG tablet Take 1 tablet by mouth daily 30 tablet 0    tamsulosin (FLOMAX) 0.4 MG capsule Take 1 capsule by mouth daily Take 0.4 mg by mouth daily 90 capsule 1    losartan (COZAAR) 50 MG tablet Take 1 tablet by mouth daily 90 tablet 1    lovastatin (MEVACOR) 40 MG tablet       albuterol sulfate HFA (VENTOLIN HFA) 108 (90 Base) MCG/ACT inhaler Inhale 2 puffs into the lungs every 4 hours as needed for Wheezing or Shortness of Breath (Patient not taking: Reported on 4/15/2020) 1 Inhaler 0     No current facility-administered medications for this visit. Temp 95.6 °F (35.3 °C) Comment: pt reported  Wt 215 lb (97.5 kg) Comment: pt reported  BMI 34.70 kg/m²     Physical Exam  Constitutional:       Appearance: He is well-developed. HENT:      Head: Normocephalic and atraumatic. Pulmonary:      Effort: Pulmonary effort is normal.   Musculoskeletal:      Comments: Left ankle with mild swelling without erythema   Skin:     Coloration: Skin is not pale. Neurological:      Mental Status: He is alert and oriented to person, place, and time. Wt Readings from Last 3 Encounters:   04/15/20 215 lb (97.5 kg)   01/31/20 225 lb (102.1 kg)   01/28/20 225 lb (102.1 kg)       BP Readings from Last 3 Encounters:   01/31/20 (!) 142/78   01/28/20 107/61   01/16/20 112/60         Assessment/Plan:  1. Acute gout of left ankle, unspecified cause  Treat acutely with prednisone side of the spine and NSAIDs and discussed potential side effects including high blood sugar  - predniSONE (DELTASONE) 10 MG tablet; Take 4 tabs by mouth daily for 2 days, 3 tabs for 2 days, 2 tabs for 1 day, 1 tab for 2 day  Dispense: 17 tablet; Refill: 0    2. History of gout  He has multiple gout recurrences recently that have been worsening. In the past he was on allopurinol but had a reaction to this. Once his acute flare has been cleared up for a little while, he will start Uloric with naproxen and can stop naproxen after 3 months. Check labs 1 month after initiating these medications.   We did discuss potential hepatotoxicity and need to check

## 2020-04-21 ENCOUNTER — TELEPHONE (OUTPATIENT)
Dept: FAMILY MEDICINE CLINIC | Age: 70
End: 2020-04-21

## 2020-04-23 ENCOUNTER — TELEPHONE (OUTPATIENT)
Dept: FAMILY MEDICINE CLINIC | Age: 70
End: 2020-04-23

## 2020-04-23 RX ORDER — COLCHICINE 0.6 MG/1
CAPSULE ORAL
Qty: 30 CAPSULE | Refills: 0 | Status: SHIPPED | OUTPATIENT
Start: 2020-04-23 | End: 2021-10-14

## 2020-04-23 NOTE — TELEPHONE ENCOUNTER
Pt states that his gout is still bad. His ankle is still swollen and tender  His pain level is pretty bad when he is up and moving and when he is not it annoys him. He is having trouble sleeping also. He finished he prednisone and wants to know what he can do now.     Please advise    thanks

## 2020-05-13 ENCOUNTER — TELEPHONE (OUTPATIENT)
Dept: FAMILY MEDICINE CLINIC | Age: 70
End: 2020-05-13

## 2020-05-13 RX ORDER — LOVASTATIN 40 MG/1
TABLET ORAL
Qty: 30 TABLET | Status: CANCELLED | OUTPATIENT
Start: 2020-05-13

## 2020-05-13 RX ORDER — DOXYCYCLINE HYCLATE 100 MG
100 TABLET ORAL DAILY
Qty: 30 TABLET | Refills: 0 | Status: SHIPPED | OUTPATIENT
Start: 2020-05-13 | End: 2020-06-12

## 2020-05-13 NOTE — TELEPHONE ENCOUNTER
Skin Condition is improving but not totally gone would like a refill on this doxycycline     Please advise

## 2020-05-15 RX ORDER — LOVASTATIN 40 MG/1
40 TABLET ORAL DAILY
Qty: 90 TABLET | Refills: 1 | Status: SHIPPED | OUTPATIENT
Start: 2020-05-15 | End: 2020-11-11

## 2020-05-22 ENCOUNTER — TELEPHONE (OUTPATIENT)
Dept: FAMILY MEDICINE CLINIC | Age: 70
End: 2020-05-22

## 2020-06-19 RX ORDER — FEBUXOSTAT 40 MG/1
TABLET, FILM COATED ORAL
Qty: 10 TABLET | Refills: 1 | Status: SHIPPED | OUTPATIENT
Start: 2020-06-19 | End: 2020-09-14

## 2020-06-26 ENCOUNTER — OFFICE VISIT (OUTPATIENT)
Dept: PRIMARY CARE CLINIC | Age: 70
End: 2020-06-26

## 2020-06-29 ENCOUNTER — TELEPHONE (OUTPATIENT)
Dept: FAMILY MEDICINE CLINIC | Age: 70
End: 2020-06-29

## 2020-06-29 RX ORDER — DOXYCYCLINE HYCLATE 100 MG
100 TABLET ORAL DAILY
Qty: 30 TABLET | Refills: 0 | Status: SHIPPED | OUTPATIENT
Start: 2020-06-29 | End: 2020-07-29

## 2020-06-29 NOTE — TELEPHONE ENCOUNTER
GOUT- BEEN ON ANTI INFLAMMATORY MED. THEN WAS PUT ON OTHER MEDICATION  THE PAIN IS GONE JUST A LITTLE BIT OF STIFFNESS.    HE WANTS TO KNOW IF HE SHOULD START THE NEW MEDS    HIDRANDENITIS- WAS PUT ON ANTIBIOTIC-DOXYCYLINE 100MG- HE IS OUT OF THIS MEDICATION  HE IS WONDERING IF HE CAN GET A REFILL ON THIS OR A NEW SCRIPT ON A DIFFERENT ANTIBIOTIC

## 2020-06-29 NOTE — TELEPHONE ENCOUNTER
Wants to know if he can start febuxostat and the indonthacin. hydrandentitis wants to know if he can have another Rx. Was helping but did not completely go away. Please advise.

## 2020-06-30 LAB
SARS-COV-2: NOT DETECTED
SOURCE: NORMAL

## 2020-07-09 ENCOUNTER — TELEPHONE (OUTPATIENT)
Dept: FAMILY MEDICINE CLINIC | Age: 70
End: 2020-07-09

## 2020-07-09 NOTE — TELEPHONE ENCOUNTER
Please help pt schedule f/u office visit with Dr Huerta  He should complete labs before visit. Thank you!

## 2020-07-09 NOTE — TELEPHONE ENCOUNTER
Pt received a letter looks like pt had incomplete labs on 4/15/20 pt wants to know what they are and should they still be completed?

## 2020-07-17 DIAGNOSIS — L73.2 HIDRADENITIS SUPPURATIVA: ICD-10-CM

## 2020-07-17 DIAGNOSIS — Z87.39 HISTORY OF GOUT: ICD-10-CM

## 2020-07-17 LAB
A/G RATIO: 1.7 (ref 1.1–2.2)
ALBUMIN SERPL-MCNC: 4.2 G/DL (ref 3.4–5)
ALP BLD-CCNC: 82 U/L (ref 40–129)
ALT SERPL-CCNC: 8 U/L (ref 10–40)
ANION GAP SERPL CALCULATED.3IONS-SCNC: 15 MMOL/L (ref 3–16)
AST SERPL-CCNC: 12 U/L (ref 15–37)
BILIRUB SERPL-MCNC: 0.3 MG/DL (ref 0–1)
BUN BLDV-MCNC: 17 MG/DL (ref 7–20)
CALCIUM SERPL-MCNC: 9.3 MG/DL (ref 8.3–10.6)
CHLORIDE BLD-SCNC: 105 MMOL/L (ref 99–110)
CO2: 21 MMOL/L (ref 21–32)
CREAT SERPL-MCNC: 0.8 MG/DL (ref 0.8–1.3)
GFR AFRICAN AMERICAN: >60
GFR NON-AFRICAN AMERICAN: >60
GLOBULIN: 2.5 G/DL
GLUCOSE BLD-MCNC: 102 MG/DL (ref 70–99)
POTASSIUM SERPL-SCNC: 4.7 MMOL/L (ref 3.5–5.1)
SODIUM BLD-SCNC: 141 MMOL/L (ref 136–145)
TOTAL PROTEIN: 6.7 G/DL (ref 6.4–8.2)
URIC ACID, SERUM: 4.9 MG/DL (ref 3.5–7.2)

## 2020-07-20 PROBLEM — Z87.39 HISTORY OF GOUT: Status: ACTIVE | Noted: 2020-07-20

## 2020-07-20 PROBLEM — M10.021 ACUTE IDIOPATHIC GOUT OF RIGHT ELBOW: Status: RESOLVED | Noted: 2020-01-29 | Resolved: 2020-07-20

## 2020-08-27 RX ORDER — TAMSULOSIN HYDROCHLORIDE 0.4 MG/1
CAPSULE ORAL
Qty: 90 CAPSULE | Refills: 0 | Status: SHIPPED | OUTPATIENT
Start: 2020-08-27 | End: 2020-11-23

## 2020-09-14 RX ORDER — FEBUXOSTAT 40 MG/1
TABLET, FILM COATED ORAL
Qty: 30 TABLET | Refills: 1 | Status: SHIPPED | OUTPATIENT
Start: 2020-09-14 | End: 2020-11-11

## 2020-09-21 ENCOUNTER — NURSE TRIAGE (OUTPATIENT)
Dept: OTHER | Facility: CLINIC | Age: 70
End: 2020-09-21

## 2020-09-21 RX ORDER — SKIN PROTECTANT 44 G/100G
OINTMENT TOPICAL 4 TIMES DAILY PRN
Qty: 2 TUBE | Refills: 2 | Status: SHIPPED | OUTPATIENT
Start: 2020-09-21 | End: 2020-09-22 | Stop reason: SDUPTHER

## 2020-09-21 NOTE — TELEPHONE ENCOUNTER
rx was sent to [facility]. rec this or aquaphor. If not improving can see me or Urology to evaluate.

## 2020-09-21 NOTE — TELEPHONE ENCOUNTER
Pt has hydradenitis and on top of that he has the lymphedema and he needs to see a dermatologist. He has been to urology and they can't treat it. He needs to see a dermatologist that treats hydradenitis.

## 2020-09-22 RX ORDER — SKIN PROTECTANT 44 G/100G
OINTMENT TOPICAL 4 TIMES DAILY PRN
Qty: 2 TUBE | Refills: 2 | Status: SHIPPED | OUTPATIENT
Start: 2020-09-22 | End: 2022-02-25

## 2020-09-22 NOTE — TELEPHONE ENCOUNTER
----- Message from Gabe Velarde. sent at 9/22/2020  1:20 PM EDT -----  Subject: Refill Request    QUESTIONS  Name of Medication? Emollient [facility]) OINT ointment  Patient-reported dosage and instructions? daily  How many days do you have left? 0  Preferred Pharmacy? [facility] [address] [facility]  Pharmacy phone number (if available)? [phone]  Additional Information for Provider? [address] [place] [address] did not receive   prescription   ---------------------------------------------------------------------------  --------------  CALL BACK INFO  What is the best way for the office to contact you? OK to leave message on   voicemail   OK to respond with secure message via [employer] portal (only for patients   who have registered [employer] account)  Preferred Call Back Phone Number?  [phone]

## 2020-09-22 NOTE — TELEPHONE ENCOUNTER
Referral placed for Derm, please give him info - they have locations across town to hopefully get him in soon. Thanks.

## 2020-09-25 ENCOUNTER — TELEPHONE (OUTPATIENT)
Dept: FAMILY MEDICINE CLINIC | Age: 70
End: 2020-09-25

## 2020-10-16 ENCOUNTER — TELEPHONE (OUTPATIENT)
Dept: FAMILY MEDICINE CLINIC | Age: 70
End: 2020-10-16

## 2020-10-16 NOTE — TELEPHONE ENCOUNTER
PT IS CALLING FOR A REFERRAL FOR [facility]  THIS IS FOR HIS LYMPHEDEMA   HE WAS SENT TO DR. HUYNH   PT HAS DONE THIS THERAPY BEFORE AT [facility] WANTS THE REFERRAL TO COME FROM DR. BAKER INSTEAD OF DR. HUYNH    [phone]    PLEASE ADVISE

## 2020-11-02 RX ORDER — LOSARTAN POTASSIUM 50 MG/1
TABLET ORAL
Qty: 90 TABLET | Refills: 0 | Status: SHIPPED | OUTPATIENT
Start: 2020-11-02 | End: 2021-02-02

## 2020-11-11 RX ORDER — LOVASTATIN 40 MG/1
TABLET ORAL
Qty: 90 TABLET | Refills: 0 | Status: SHIPPED | OUTPATIENT
Start: 2020-11-11 | End: 2021-02-08

## 2020-11-11 RX ORDER — FEBUXOSTAT 40 MG/1
TABLET, FILM COATED ORAL
Qty: 30 TABLET | Refills: 1 | Status: SHIPPED | OUTPATIENT
Start: 2020-11-11 | End: 2021-01-07

## 2020-11-12 RX ORDER — FEBUXOSTAT 40 MG/1
TABLET, FILM COATED ORAL
Qty: 30 TABLET | Refills: 1 | OUTPATIENT
Start: 2020-11-12

## 2020-11-23 RX ORDER — TAMSULOSIN HYDROCHLORIDE 0.4 MG/1
CAPSULE ORAL
Qty: 90 CAPSULE | Refills: 0 | Status: SHIPPED | OUTPATIENT
Start: 2020-11-23 | End: 2021-02-18

## 2020-12-28 ENCOUNTER — TELEPHONE (OUTPATIENT)
Dept: FAMILY MEDICINE CLINIC | Age: 70
End: 2020-12-28

## 2020-12-28 NOTE — TELEPHONE ENCOUNTER
Pt went to a dermatologist and wants a opinion on medications   The pt is on sulfamethoxazole supposed to take 2 times a day he was told this could be a problem because he is on lovastatin and it could mess with the pt potassium possibly     Please advise

## 2020-12-28 NOTE — TELEPHONE ENCOUNTER
He will be taking sulfamethoxazole/ trimethoprim 400/80. Will be taking for 3 months to treat Hidradenitis suppurativa.

## 2020-12-28 NOTE — TELEPHONE ENCOUNTER
Reviewed meds and chart, Should be ok to take this. Dermatologist can order a potassium level to check 2-4 weeks in just to ensure all is well from that standpoint if he would like.

## 2021-01-07 DIAGNOSIS — Z87.39 HISTORY OF GOUT: ICD-10-CM

## 2021-01-07 RX ORDER — FEBUXOSTAT 40 MG/1
TABLET, FILM COATED ORAL
Qty: 30 TABLET | Refills: 0 | Status: SHIPPED | OUTPATIENT
Start: 2021-01-07 | End: 2021-02-03

## 2021-01-25 ENCOUNTER — VIRTUAL VISIT (OUTPATIENT)
Dept: FAMILY MEDICINE CLINIC | Age: 71
End: 2021-01-25
Payer: MEDICARE

## 2021-01-25 DIAGNOSIS — N40.1 BENIGN PROSTATIC HYPERPLASIA WITH URINARY FREQUENCY: ICD-10-CM

## 2021-01-25 DIAGNOSIS — Z87.39 HISTORY OF GOUT: ICD-10-CM

## 2021-01-25 DIAGNOSIS — I10 ESSENTIAL HYPERTENSION: Primary | ICD-10-CM

## 2021-01-25 DIAGNOSIS — E78.2 MIXED HYPERLIPIDEMIA: ICD-10-CM

## 2021-01-25 DIAGNOSIS — R35.0 BENIGN PROSTATIC HYPERPLASIA WITH URINARY FREQUENCY: ICD-10-CM

## 2021-01-25 DIAGNOSIS — R73.03 PREDIABETES: ICD-10-CM

## 2021-01-25 PROCEDURE — G8510 SCR DEP NEG, NO PLAN REQD: HCPCS | Performed by: FAMILY MEDICINE

## 2021-01-25 PROCEDURE — 3017F COLORECTAL CA SCREEN DOC REV: CPT | Performed by: FAMILY MEDICINE

## 2021-01-25 PROCEDURE — 4040F PNEUMOC VAC/ADMIN/RCVD: CPT | Performed by: FAMILY MEDICINE

## 2021-01-25 PROCEDURE — G8427 DOCREV CUR MEDS BY ELIG CLIN: HCPCS | Performed by: FAMILY MEDICINE

## 2021-01-25 PROCEDURE — 99214 OFFICE O/P EST MOD 30 MIN: CPT | Performed by: FAMILY MEDICINE

## 2021-01-25 PROCEDURE — 3288F FALL RISK ASSESSMENT DOCD: CPT | Performed by: FAMILY MEDICINE

## 2021-01-25 PROCEDURE — 1123F ACP DISCUSS/DSCN MKR DOCD: CPT | Performed by: FAMILY MEDICINE

## 2021-01-25 ASSESSMENT — PATIENT HEALTH QUESTIONNAIRE - PHQ9
SUM OF ALL RESPONSES TO PHQ QUESTIONS 1-9: 0
SUM OF ALL RESPONSES TO PHQ9 QUESTIONS 1 & 2: 0
SUM OF ALL RESPONSES TO PHQ QUESTIONS 1-9: 0
2. FEELING DOWN, DEPRESSED OR HOPELESS: 0

## 2021-01-25 NOTE — PROGRESS NOTES
 colchicine (MITIGARE) 0.6 MG capsule Take 1 cap by mouth BID for 3 days, followed by once daily until symptoms resolve (Patient not taking: Reported on 1/25/2021) 30 capsule 0    albuterol sulfate HFA (VENTOLIN HFA) 108 (90 Base) MCG/ACT inhaler Inhale 2 puffs into the lungs every 4 hours as needed for Wheezing or Shortness of Breath (Patient not taking: Reported on 4/15/2020) 1 Inhaler 0     No current facility-administered medications for this visit. There were no vitals taken for this visit. Physical Exam  Constitutional:       Appearance: He is well-developed. HENT:      Head: Normocephalic and atraumatic. Pulmonary:      Effort: Pulmonary effort is normal.   Skin:     Coloration: Skin is not pale. Neurological:      Mental Status: He is alert and oriented to person, place, and time. Wt Readings from Last 3 Encounters:   04/15/20 215 lb (97.5 kg)   01/31/20 225 lb (102.1 kg)   01/28/20 225 lb (102.1 kg)     BP Readings from Last 3 Encounters:   01/31/20 (!) 142/78   01/28/20 107/61   01/16/20 112/60       Assessment/Plan:  1. Essential hypertension  Continue current regimen. Needs in person check next visit  - CBC Auto Differential; Future  - Comprehensive Metabolic Panel; Future    2. Mixed hyperlipidemia  Continue statin  - Comprehensive Metabolic Panel; Future  - Lipid Panel; Future    3. History of gout - on Uloric, monitor LFTs  Doing well on Uloric. Monitor LFTs. We have reviewed potential SEs including potential cardiac risk  - Comprehensive Metabolic Panel; Future    4. Benign prostatic hyperplasia with urinary frequency  Continue medication and check PSA  - PSA, Prostatic Specific Antigen; Future    5.  Prediabetes  Discussed diet  - Hemoglobin A1C; Future    F/u 6 months HTN Paty Saravia is a 72 y.o. male being evaluated by a Virtual Visit (video visit) encounter to address concerns as mentioned above. A caregiver was present when appropriate. Due to this being a TeleHealth encounter (During OHZUQ-06 public health emergency), evaluation of the following organ systems was limited: Vitals/Constitutional/EENT/Resp/CV/GI//MS/Neuro/Skin/Heme-Lymph-Imm. Pursuant to the emergency declaration under the [address] [place] [address] and the [employer] Act, this Virtual Visit was conducted with patient's (and/or legal guardian's) consent, to reduce the patient's risk of exposure to COVID-19 and provide necessary medical care. The patient (and/or legal guardian) has also been advised to contact this office for worsening conditions or problems, and seek emergency medical treatment and/or call 911 if deemed necessary. Patient identification was verified at the start of the visit: yes    Total time spent for this encounter: not billed on time    Services were provided through a video synchronous discussion virtually to substitute for in-person clinic visit. Patient and provider were located at their individual homes. --Jose Rafael Noble MD on 1/26/2021 at 3:48 PM    An electronic signature was used to authenticate this note.

## 2021-02-02 DIAGNOSIS — I10 ESSENTIAL HYPERTENSION: ICD-10-CM

## 2021-02-02 RX ORDER — LOSARTAN POTASSIUM 50 MG/1
TABLET ORAL
Qty: 90 TABLET | Refills: 0 | Status: SHIPPED | OUTPATIENT
Start: 2021-02-02 | End: 2021-02-03

## 2021-02-03 DIAGNOSIS — Z87.39 HISTORY OF GOUT: ICD-10-CM

## 2021-02-03 DIAGNOSIS — I10 ESSENTIAL HYPERTENSION: ICD-10-CM

## 2021-02-03 RX ORDER — LOSARTAN POTASSIUM 50 MG/1
TABLET ORAL
Qty: 90 TABLET | Refills: 0 | Status: SHIPPED | OUTPATIENT
Start: 2021-02-03 | End: 2021-05-05

## 2021-02-03 RX ORDER — FEBUXOSTAT 40 MG/1
TABLET, FILM COATED ORAL
Qty: 30 TABLET | Refills: 1 | Status: SHIPPED | OUTPATIENT
Start: 2021-02-03 | End: 2021-04-07

## 2021-02-16 RX ORDER — LOVASTATIN 40 MG/1
TABLET ORAL
Qty: 90 TABLET | Refills: 0 | Status: SHIPPED | OUTPATIENT
Start: 2021-02-16 | End: 2021-08-16

## 2021-03-02 ENCOUNTER — TELEPHONE (OUTPATIENT)
Dept: FAMILY MEDICINE CLINIC | Age: 71
End: 2021-03-02

## 2021-03-02 RX ORDER — PREDNISONE 10 MG/1
TABLET ORAL
Qty: 20 TABLET | Refills: 0 | Status: SHIPPED | OUTPATIENT
Start: 2021-03-02 | End: 2021-10-14

## 2021-03-02 NOTE — TELEPHONE ENCOUNTER
Sent in prednisone for acute flare of gout  Some of his other joint issues are unlikely to be gout and are likely osteoarthritis. If the antibiotic his Derm prescribed is not long term it would be good to restart naproxen once that is done.   Can schedule non-urgent in person appt in next 4-6 weeks to discuss if still having issues after prednisone  Thanks

## 2021-03-02 NOTE — TELEPHONE ENCOUNTER
Patient calling stating having an issue with gout. Pain is right ankle, right hand. Patient noticed his shoulder and hands are painful sometimes and this started happening after he changed his medications. patient is taking two types of medications for this. febuxostat he takes daily,  And he also takes to control inflammation naproxen. Dermatologist prescribed an antibiotic and it conflicted with naproxen. he stopped taking naproxen about a month ago   dermatoligist prescribed sulfer fulamathaizle( patient spellled)      Patient wanting to know what medication he should take for his gout   Does patient need another appt with dr. Ashley Martinez to discuss these medications?    Please advise

## 2021-04-07 DIAGNOSIS — Z87.39 HISTORY OF GOUT: ICD-10-CM

## 2021-04-07 RX ORDER — FEBUXOSTAT 40 MG/1
TABLET, FILM COATED ORAL
Qty: 30 TABLET | Refills: 2 | Status: SHIPPED | OUTPATIENT
Start: 2021-04-07 | End: 2021-07-06

## 2021-04-11 DIAGNOSIS — Z87.39 HISTORY OF GOUT: ICD-10-CM

## 2021-04-12 RX ORDER — FEBUXOSTAT 40 MG/1
TABLET, FILM COATED ORAL
Qty: 30 TABLET | Refills: 1 | OUTPATIENT
Start: 2021-04-12

## 2021-05-05 DIAGNOSIS — I10 ESSENTIAL HYPERTENSION: ICD-10-CM

## 2021-05-05 RX ORDER — LOSARTAN POTASSIUM 50 MG/1
TABLET ORAL
Qty: 90 TABLET | Refills: 0 | Status: SHIPPED | OUTPATIENT
Start: 2021-05-05 | End: 2021-08-02

## 2021-05-19 DIAGNOSIS — R35.0 BENIGN PROSTATIC HYPERPLASIA WITH URINARY FREQUENCY: ICD-10-CM

## 2021-05-19 DIAGNOSIS — R73.03 PREDIABETES: ICD-10-CM

## 2021-05-19 DIAGNOSIS — Z87.39 HISTORY OF GOUT: ICD-10-CM

## 2021-05-19 DIAGNOSIS — I10 ESSENTIAL HYPERTENSION: ICD-10-CM

## 2021-05-19 DIAGNOSIS — N40.1 BENIGN PROSTATIC HYPERPLASIA WITH URINARY FREQUENCY: ICD-10-CM

## 2021-05-19 DIAGNOSIS — E78.2 MIXED HYPERLIPIDEMIA: ICD-10-CM

## 2021-05-19 LAB
A/G RATIO: 1.9 (ref 1.1–2.2)
ALBUMIN SERPL-MCNC: 4.6 G/DL (ref 3.4–5)
ALP BLD-CCNC: 84 U/L (ref 40–129)
ALT SERPL-CCNC: 11 U/L (ref 10–40)
ANION GAP SERPL CALCULATED.3IONS-SCNC: 11 MMOL/L (ref 3–16)
AST SERPL-CCNC: 13 U/L (ref 15–37)
BASOPHILS ABSOLUTE: 0.1 K/UL (ref 0–0.2)
BASOPHILS RELATIVE PERCENT: 1 %
BILIRUB SERPL-MCNC: 0.3 MG/DL (ref 0–1)
BUN BLDV-MCNC: 15 MG/DL (ref 7–20)
CALCIUM SERPL-MCNC: 8.9 MG/DL (ref 8.3–10.6)
CHLORIDE BLD-SCNC: 104 MMOL/L (ref 99–110)
CHOLESTEROL, TOTAL: 157 MG/DL (ref 0–199)
CO2: 23 MMOL/L (ref 21–32)
CREAT SERPL-MCNC: 1 MG/DL (ref 0.8–1.3)
EOSINOPHILS ABSOLUTE: 0.1 K/UL (ref 0–0.6)
EOSINOPHILS RELATIVE PERCENT: 0.7 %
GFR AFRICAN AMERICAN: >60
GFR NON-AFRICAN AMERICAN: >60
GLOBULIN: 2.4 G/DL
GLUCOSE BLD-MCNC: 97 MG/DL (ref 70–99)
HCT VFR BLD CALC: 43.3 % (ref 40.5–52.5)
HDLC SERPL-MCNC: 52 MG/DL (ref 40–60)
HEMOGLOBIN: 14.9 G/DL (ref 13.5–17.5)
LDL CHOLESTEROL CALCULATED: 79 MG/DL
LYMPHOCYTES ABSOLUTE: 3.3 K/UL (ref 1–5.1)
LYMPHOCYTES RELATIVE PERCENT: 35.1 %
MCH RBC QN AUTO: 30.6 PG (ref 26–34)
MCHC RBC AUTO-ENTMCNC: 34.3 G/DL (ref 31–36)
MCV RBC AUTO: 89.1 FL (ref 80–100)
MONOCYTES ABSOLUTE: 0.6 K/UL (ref 0–1.3)
MONOCYTES RELATIVE PERCENT: 6.6 %
NEUTROPHILS ABSOLUTE: 5.4 K/UL (ref 1.7–7.7)
NEUTROPHILS RELATIVE PERCENT: 56.6 %
PDW BLD-RTO: 14.6 % (ref 12.4–15.4)
PLATELET # BLD: 243 K/UL (ref 135–450)
PMV BLD AUTO: 9.3 FL (ref 5–10.5)
POTASSIUM SERPL-SCNC: 4.9 MMOL/L (ref 3.5–5.1)
PROSTATE SPECIFIC ANTIGEN: 3.43 NG/ML (ref 0–4)
RBC # BLD: 4.86 M/UL (ref 4.2–5.9)
SODIUM BLD-SCNC: 138 MMOL/L (ref 136–145)
TOTAL PROTEIN: 7 G/DL (ref 6.4–8.2)
TRIGL SERPL-MCNC: 131 MG/DL (ref 0–150)
VLDLC SERPL CALC-MCNC: 26 MG/DL
WBC # BLD: 9.5 K/UL (ref 4–11)

## 2021-05-20 LAB
ESTIMATED AVERAGE GLUCOSE: 114 MG/DL
HBA1C MFR BLD: 5.6 %

## 2021-07-06 DIAGNOSIS — Z87.39 HISTORY OF GOUT: ICD-10-CM

## 2021-07-06 RX ORDER — FEBUXOSTAT 40 MG/1
TABLET, FILM COATED ORAL
Qty: 30 TABLET | Refills: 1 | Status: SHIPPED | OUTPATIENT
Start: 2021-07-06 | End: 2021-09-02

## 2021-07-26 ENCOUNTER — TELEPHONE (OUTPATIENT)
Dept: FAMILY MEDICINE CLINIC | Age: 71
End: 2021-07-26

## 2021-07-26 NOTE — TELEPHONE ENCOUNTER
----- Message from [facility] sent at 7/26/2021 12:49 PM EDT -----  Subject: Appointment Request    Reason for Call: Routine Medicare AWV    QUESTIONS  Type of Appointment? Established Patient  Reason for appointment request? No appointments available during search  Additional Information for Provider? patient is wanting to schedule his   AWV for august 2nd 3rd or 4th if possible. please follow up with patient   to schedule   ---------------------------------------------------------------------------  --------------  CALL BACK INFO  What is the best way for the office to contact you? OK to leave message on   voicemail  Preferred Call Back Phone Number? [phone]  ---------------------------------------------------------------------------  --------------  SCRIPT ANSWERS  Relationship to Patient? Self  (If the patient has Medicare as their primary insurance coverage ask this   question) Are you requesting a Medicare Annual Wellness Visit? Yes   (Is the patient requesting a pap smear with their physical exam?)? No  (Is the patient requesting their annual physical and does not need PAP or   AWV per above?)? No  Have you been diagnosed with, awaiting test results for, or told that you   are suspected of having COVID-19 (Coronavirus)? (If patient has tested   negative or was tested as a requirement for work, school, or travel and   not based on symptoms, answer no)? No  Do you currently have flu-like symptoms including fever or chills, cough,   shortness of breath, difficulty breathing, or new loss of taste or smell? No  Have you had close contact with someone with COVID-19 in the last 14 days? No  (Service Expert  click yes below to proceed with "[employer] As Usual   Scheduling)?  Yes

## 2021-07-26 NOTE — TELEPHONE ENCOUNTER
Pt calling back to reschedule AWV on 8.23.2021    Advised of appt 10.14.2021 3:00pm for awv    Scheduled

## 2021-07-31 DIAGNOSIS — I10 ESSENTIAL HYPERTENSION: ICD-10-CM

## 2021-08-02 RX ORDER — LOSARTAN POTASSIUM 50 MG/1
TABLET ORAL
Qty: 90 TABLET | Refills: 0 | Status: SHIPPED
Start: 2021-08-02 | End: 2021-10-14 | Stop reason: DRUGHIGH

## 2021-08-19 DIAGNOSIS — Z87.39 HISTORY OF GOUT: ICD-10-CM

## 2021-08-19 RX ORDER — NAPROXEN 375 MG/1
TABLET ORAL
Qty: 60 TABLET | Refills: 1 | Status: SHIPPED | OUTPATIENT
Start: 2021-08-19 | End: 2021-11-02

## 2021-09-02 DIAGNOSIS — Z87.39 HISTORY OF GOUT: ICD-10-CM

## 2021-09-02 RX ORDER — FEBUXOSTAT 40 MG/1
TABLET, FILM COATED ORAL
Qty: 30 TABLET | Refills: 1 | Status: SHIPPED | OUTPATIENT
Start: 2021-09-02 | End: 2021-11-03

## 2021-10-14 ENCOUNTER — OFFICE VISIT (OUTPATIENT)
Dept: FAMILY MEDICINE CLINIC | Age: 71
End: 2021-10-14
Payer: MEDICARE

## 2021-10-14 VITALS
DIASTOLIC BLOOD PRESSURE: 72 MMHG | RESPIRATION RATE: 18 BRPM | OXYGEN SATURATION: 91 % | WEIGHT: 227.8 LBS | BODY MASS INDEX: 36.61 KG/M2 | HEIGHT: 66 IN | HEART RATE: 80 BPM | SYSTOLIC BLOOD PRESSURE: 140 MMHG

## 2021-10-14 DIAGNOSIS — E66.01 SEVERE OBESITY (BMI 35.0-35.9 WITH COMORBIDITY) (HCC): ICD-10-CM

## 2021-10-14 DIAGNOSIS — L73.2 HIDRADENITIS: ICD-10-CM

## 2021-10-14 DIAGNOSIS — Z87.891 PERSONAL HISTORY OF TOBACCO USE, PRESENTING HAZARDS TO HEALTH: ICD-10-CM

## 2021-10-14 DIAGNOSIS — Z87.39 HISTORY OF GOUT: ICD-10-CM

## 2021-10-14 DIAGNOSIS — I10 ESSENTIAL HYPERTENSION: ICD-10-CM

## 2021-10-14 DIAGNOSIS — Z00.00 ROUTINE GENERAL MEDICAL EXAMINATION AT A HEALTH CARE FACILITY: Primary | ICD-10-CM

## 2021-10-14 DIAGNOSIS — Z72.0 TOBACCO USE: ICD-10-CM

## 2021-10-14 DIAGNOSIS — E78.2 MIXED HYPERLIPIDEMIA: ICD-10-CM

## 2021-10-14 DIAGNOSIS — Z23 NEEDS FLU SHOT: ICD-10-CM

## 2021-10-14 DIAGNOSIS — I89.0 LYMPHEDEMA OF GENITALIA: ICD-10-CM

## 2021-10-14 PROBLEM — M25.521 RIGHT ELBOW PAIN: Status: RESOLVED | Noted: 2020-01-29 | Resolved: 2021-10-14

## 2021-10-14 PROCEDURE — G0008 ADMIN INFLUENZA VIRUS VAC: HCPCS | Performed by: FAMILY MEDICINE

## 2021-10-14 PROCEDURE — 99406 BEHAV CHNG SMOKING 3-10 MIN: CPT | Performed by: FAMILY MEDICINE

## 2021-10-14 PROCEDURE — 3017F COLORECTAL CA SCREEN DOC REV: CPT | Performed by: FAMILY MEDICINE

## 2021-10-14 PROCEDURE — 1123F ACP DISCUSS/DSCN MKR DOCD: CPT | Performed by: FAMILY MEDICINE

## 2021-10-14 PROCEDURE — 99214 OFFICE O/P EST MOD 30 MIN: CPT | Performed by: FAMILY MEDICINE

## 2021-10-14 PROCEDURE — G8417 CALC BMI ABV UP PARAM F/U: HCPCS | Performed by: FAMILY MEDICINE

## 2021-10-14 PROCEDURE — G8427 DOCREV CUR MEDS BY ELIG CLIN: HCPCS | Performed by: FAMILY MEDICINE

## 2021-10-14 PROCEDURE — G0439 PPPS, SUBSEQ VISIT: HCPCS | Performed by: FAMILY MEDICINE

## 2021-10-14 PROCEDURE — 4004F PT TOBACCO SCREEN RCVD TLK: CPT | Performed by: FAMILY MEDICINE

## 2021-10-14 PROCEDURE — 4040F PNEUMOC VAC/ADMIN/RCVD: CPT | Performed by: FAMILY MEDICINE

## 2021-10-14 PROCEDURE — G8484 FLU IMMUNIZE NO ADMIN: HCPCS | Performed by: FAMILY MEDICINE

## 2021-10-14 PROCEDURE — 90694 VACC AIIV4 NO PRSRV 0.5ML IM: CPT | Performed by: FAMILY MEDICINE

## 2021-10-14 RX ORDER — DAPSONE 25 MG/1
25 TABLET ORAL
COMMUNITY
Start: 2021-08-12 | End: 2022-05-05

## 2021-10-14 RX ORDER — GUAIFENESIN 600 MG/1
600 TABLET, EXTENDED RELEASE ORAL 2 TIMES DAILY
Qty: 30 TABLET | Refills: 0 | Status: SHIPPED | OUTPATIENT
Start: 2021-10-14 | End: 2021-10-29

## 2021-10-14 RX ORDER — LOSARTAN POTASSIUM AND HYDROCHLOROTHIAZIDE 12.5; 5 MG/1; MG/1
1 TABLET ORAL DAILY
Qty: 90 TABLET | Refills: 1 | Status: SHIPPED | OUTPATIENT
Start: 2021-10-14 | End: 2022-03-04 | Stop reason: DRUGHIGH

## 2021-10-14 ASSESSMENT — LIFESTYLE VARIABLES
HOW OFTEN DURING THE LAST YEAR HAVE YOU FAILED TO DO WHAT WAS NORMALLY EXPECTED FROM YOU BECAUSE OF DRINKING: 0
HOW OFTEN DURING THE LAST YEAR HAVE YOU HAD A FEELING OF GUILT OR REMORSE AFTER DRINKING: 0
HOW OFTEN DO YOU HAVE A DRINK CONTAINING ALCOHOL: 3
HOW MANY STANDARD DRINKS CONTAINING ALCOHOL DO YOU HAVE ON A TYPICAL DAY: 0
HOW OFTEN DURING THE LAST YEAR HAVE YOU FOUND THAT YOU WERE NOT ABLE TO STOP DRINKING ONCE YOU HAD STARTED: 0
HOW OFTEN DURING THE LAST YEAR HAVE YOU BEEN UNABLE TO REMEMBER WHAT HAPPENED THE NIGHT BEFORE BECAUSE YOU HAD BEEN DRINKING: 0
HAS A RELATIVE, FRIEND, DOCTOR, OR ANOTHER HEALTH PROFESSIONAL EXPRESSED CONCERN ABOUT YOUR DRINKING OR SUGGESTED YOU CUT DOWN: 0
HOW OFTEN DURING THE LAST YEAR HAVE YOU NEEDED AN ALCOHOLIC DRINK FIRST THING IN THE MORNING TO GET YOURSELF GOING AFTER A NIGHT OF HEAVY DRINKING: 0
HOW OFTEN DO YOU HAVE SIX OR MORE DRINKS ON ONE OCCASION: 1
HAVE YOU OR SOMEONE ELSE BEEN INJURED AS A RESULT OF YOUR DRINKING: 0
AUDIT TOTAL SCORE: 4
HOW MANY STANDARD DRINKS CONTAINING ALCOHOL DO YOU HAVE ON A TYPICAL DAY: 0
AUDIT-C TOTAL SCORE: 4
HOW OFTEN DO YOU HAVE A DRINK CONTAINING ALCOHOL: 3
HOW OFTEN DO YOU HAVE SIX OR MORE DRINKS ON ONE OCCASION: 1
AUDIT-C TOTAL SCORE: 4

## 2021-10-14 ASSESSMENT — PATIENT HEALTH QUESTIONNAIRE - PHQ9
1. LITTLE INTEREST OR PLEASURE IN DOING THINGS: 0
SUM OF ALL RESPONSES TO PHQ QUESTIONS 1-9: 0
SUM OF ALL RESPONSES TO PHQ QUESTIONS 1-9: 0
2. FEELING DOWN, DEPRESSED OR HOPELESS: 0
SUM OF ALL RESPONSES TO PHQ9 QUESTIONS 1 & 2: 0
SUM OF ALL RESPONSES TO PHQ QUESTIONS 1-9: 0

## 2021-10-14 NOTE — PROGRESS NOTES
Medicare Annual Wellness Visit  Name: Byron Waldron Date: 10/14/2021   MRN: <[id number]> Sex: Male   Age: 80 y.o. Ethnicity: Non- / Non    : 1950 Race: White (non-)      Carina Estes is here for Medicare AWV (Pt is sleeping with a cpap machine, and is waking up with alot of congestion in the morning. Pt is also having some finger pains . )    Screenings for behavioral, psychosocial and functional/safety risks, and cognitive dysfunction are all negative except as indicated below. These results, as well as other patient data from the [address] form, are documented in Flowsheets linked to this Encounter. Allergies   Allergen Reactions    Allopurinol Rash         Prior to Visit Medications    Medication Sig Taking?  Authorizing Provider   dapsone 25 MG tablet  Yes Historical Provider, MD   mupirocin (BACTROBAN) 2 % ointment  Yes Historical Provider, MD   losartan-hydroCHLOROthiazide (HYZAAR) 50-12.5 MG per tablet Take 1 tablet by mouth daily Yes Yin Pitts MD   febuxostat (ULORIC) 40 MG TABS tablet TAKE ONE TABLET BY MOUTH DAILY Yes Marilu Totht Day, MD   naproxen (NAPROSYN) 375 MG tablet TAKE ONE TABLET BY MOUTH TWICE A DAY WITH MEALS Yes Marilu Huerta, MD   tamsulosin (FLOMAX) 0.4 MG capsule TAKE ONE CAPSULE BY MOUTH DAILY Yes Marilu Sleet Day, MD   lovastatin (MEVACOR) 40 MG tablet TAKE ONE TABLET BY MOUTH DAILY Yes Marilujmaa Huerta, MD [facility] OINT ointment Apply topically 4 times daily as needed (dry skin or irritation)  Patient not taking: Reported on 10/14/2021  Marilu Huerta, MD   albuterol sulfate HFA (VENTOLIN HFA) 108 (90 Base) MCG/ACT inhaler Inhale 2 puffs into the lungs every 4 hours as needed for Wheezing or Shortness of Breath  Patient not taking: Reported on 4/15/2020  JAYRO Hernandez - CNP         Past Medical History:   Diagnosis Date    Hydradenitis     Lymphedema        Past Surgical History:   Procedure Laterality Date    BACK SURGERY   Family History   Problem Relation Age of Onset    Heart Disease Mother     Rheum Arthritis Mother        CareTeam (Including outside providers/suppliers regularly involved in providing care):   Patient Care Team:  Estrellita Hogan MD as PCP - General (Family Medicine)  Estrellita Hogan MD as PCP - [facility] Empaneled Provider    Wt Readings from Last 3 Encounters:   10/14/21 227 lb 12.8 oz (103.3 kg)   04/15/20 215 lb (97.5 kg)   01/31/20 225 lb (102.1 kg)     Vitals:    10/14/21 1511   BP: (!) 140/72   Site: Right Upper Arm   Position: Sitting   Cuff Size: Large Adult   Pulse: 80   Resp: 18   SpO2: 91%   Weight: 227 lb 12.8 oz (103.3 kg)   Height: 5' 6\" (1.676 m)     Body mass index is 36.77 kg/m². Based upon direct observation of the patient, evaluation of cognition reveals recent and remote memory intact. Patient's complete Health Risk Assessment and screening values have been reviewed and are found in Flowsheets. The following problems were reviewed today and where indicated follow up appointments were made and/or referrals ordered. Positive Risk Factor Screenings with Interventions:         Substance History:  Social History     Tobacco History     Smoking Status  Current Every Day Smoker Smoking Start Date  1/1/1967 Smoking Frequency  1.5 packs/day for 50 years (76 pk yrs) Smoking Tobacco Type  Cigarettes    Smokeless Tobacco Use  Never Used          Alcohol History     Alcohol Use Status  Not Currently          Drug Use     Drug Use Status  Never          Sexual Activity     Sexually Active  Not Asked               Alcohol Screening: Audit-C Score: 4  Total Score: 4    A score of 8 or more is associated with harmful or hazardous drinking. A score of 13 or more in women, and 15 or more in men, is likely to indicate alcohol dependence.   Substance Abuse Interventions:  · Tobacco abuse:  patient is not ready to work toward tobacco cessation at this time    [address] and ACP:  General  In general, how would you say your health is?: Good  In the past 7 days, have you experienced any of the following?  New or Increased Pain, New or Increased Fatigue, Loneliness, Social Isolation, Stress or Anger?: None of These  Do you get the social and emotional support that you need?: Yes  Do you have a Living Will?: Yes  Advance Directives     Power of  Living Will ACP-Advance Directive ACP-Power of     Not on File Not on File Not on File Not on File      General Health Risk Interventions:  · Discussed healthcare POA which he will review with his wife    Health Habits/Nutrition:  Health Habits/Nutrition  Do you exercise for at least 20 minutes 2-3 times per week?: (!) No  Have you lost any weight without trying in the past 3 months?: No  Do you eat only one meal per day?: No  Have you seen the dentist within the past year?: Yes  Body mass index: (!) 36.76  Health Habits/Nutrition Interventions:  · No routine exercise  · No change in eating habits    Hearing/Vision:  No exam data present  Hearing/Vision  Do you or your family notice any trouble with your hearing that hasn't been managed with hearing aids?: No  Do you have difficulty driving, watching TV, or doing any of your daily activities because of your eyesight?: No  Have you had an eye exam within the past year?: (!) No  Hearing/Vision Interventions:  · Discussed getting an eye exam - has an Optometrist       Personalized Preventive Plan   Current Health Maintenance Status  Immunization History   Administered Date(s) Administered    COVID-19, Alvarez Peter, PF, 30mcg/0.3mL 02/24/2021, 03/19/2021    Influenza, Triv, inactivated, subunit, adjuvanted, IM (Fluad 65 yrs and older) 10/16/2019    Pneumococcal Conjugate 13-valent ([id number]) 10/14/2017    Pneumococcal Polysaccharide ([id number]) 01/16/2020    Td, unspecified formulation 01/06/1997    Tdap (Boostrix, Adacel) 03/19/2015    Zoster Live (Zostavax) 01/15/2014        Health Maintenance   Topic Date

## 2021-10-14 NOTE — PROGRESS NOTES
10/14/2021    This is a 80 y.o. male     HPI    HTN  BP Readings from Last 3 Encounters:   10/14/21 (!) 140/72   01/31/20 (!) 142/78   01/28/20 107/61   on losartan 50mg    Hidradenitis  - on dapsone chronically. Gets monthly blood tests for monitoring. Follows with Dermatology Dr. Miguel oRa    BPH  - flomax is working well for him    Chronic lymphedema  - follows with Derm and is also seeing a therapist for fluid mobilization    HLD  Lab Results   Component Value Date    LDLCALC 79 05/19/2021   on lovastatin 40mg        Review of Systems     Current Outpatient Medications   Medication Sig Dispense Refill    dapsone 25 MG tablet       mupirocin (BACTROBAN) 2 % ointment       losartan-hydroCHLOROthiazide (HYZAAR) 50-12.5 MG per tablet Take 1 tablet by mouth daily 90 tablet 1    guaiFENesin (MUCINEX) 600 MG extended release tablet Take 1 tablet by mouth 2 times daily for 15 days 30 tablet 0    febuxostat (ULORIC) 40 MG TABS tablet TAKE ONE TABLET BY MOUTH DAILY 30 tablet 1    naproxen (NAPROSYN) 375 MG tablet TAKE ONE TABLET BY MOUTH TWICE A DAY WITH MEALS 60 tablet 1    tamsulosin (FLOMAX) 0.4 MG capsule TAKE ONE CAPSULE BY MOUTH DAILY 90 capsule 1    lovastatin (MEVACOR) 40 MG tablet TAKE ONE TABLET BY MOUTH DAILY 90 tablet 1    Emollient (DERMAPHOR) OINT ointment Apply topically 4 times daily as needed (dry skin or irritation) (Patient not taking: Reported on 10/14/2021) 2 Tube 2    albuterol sulfate HFA (VENTOLIN HFA) 108 (90 Base) MCG/ACT inhaler Inhale 2 puffs into the lungs every 4 hours as needed for Wheezing or Shortness of Breath (Patient not taking: Reported on 4/15/2020) 1 Inhaler 0     No current facility-administered medications for this visit.        BP (!) 140/72 (Site: Right Upper Arm, Position: Sitting, Cuff Size: Large Adult)   Pulse 80   Resp 18   Ht 5' 6\" (1.676 m)   Wt 227 lb 12.8 oz (103.3 kg)   SpO2 91%   BMI 36.77 kg/m²     Physical Exam    Wt Readings from Last 3 Encounters:

## 2021-11-02 DIAGNOSIS — Z87.39 HISTORY OF GOUT: ICD-10-CM

## 2021-11-02 RX ORDER — NAPROXEN 375 MG/1
TABLET ORAL
Qty: 60 TABLET | Refills: 1 | Status: SHIPPED | OUTPATIENT
Start: 2021-11-02 | End: 2022-01-03

## 2021-11-08 ENCOUNTER — TELEPHONE (OUTPATIENT)
Dept: FAMILY MEDICINE CLINIC | Age: 71
End: 2021-11-08

## 2021-11-08 NOTE — TELEPHONE ENCOUNTER
Pt stated that he was here on Oct 14, 2021. He was starting with a cough and Dr. Gadiel Viramontes told him to get some robitussin, however he believes it has turned into bronchitis. He has had this before and feels like it's the same. He currently is coughing and has a lot of phlegm and a runny nose. He says it's worse in the morning. Not other symptoms.     Please Advise

## 2021-12-20 ENCOUNTER — TELEPHONE (OUTPATIENT)
Dept: FAMILY MEDICINE CLINIC | Age: 71
End: 2021-12-20

## 2021-12-20 NOTE — TELEPHONE ENCOUNTER
Sorry he's not feeling well  This far out it could be a separate infection. There are a lot of viral things going around right now. If no fever or shortness of breath, would take Mucinex-DM over the counter for cough and congestion  If not improving then a virtual at some point may be warranted. Thanks.

## 2022-01-02 DIAGNOSIS — Z87.39 HISTORY OF GOUT: ICD-10-CM

## 2022-01-03 RX ORDER — NAPROXEN 375 MG/1
TABLET ORAL
Qty: 60 TABLET | Refills: 1 | Status: SHIPPED | OUTPATIENT
Start: 2022-01-03 | End: 2022-03-04

## 2022-01-24 ENCOUNTER — TELEPHONE (OUTPATIENT)
Dept: FAMILY MEDICINE CLINIC | Age: 72
End: 2022-01-24

## 2022-01-24 NOTE — TELEPHONE ENCOUNTER
Called pt he started taking tylenol yesterday said he will keep taking it for a couple days then if it isn't helping he will come in

## 2022-02-25 ENCOUNTER — APPOINTMENT (OUTPATIENT)
Dept: GENERAL RADIOLOGY | Age: 72
DRG: 190 | End: 2022-02-25
Payer: MEDICARE

## 2022-02-25 ENCOUNTER — HOSPITAL ENCOUNTER (INPATIENT)
Age: 72
LOS: 2 days | Discharge: HOME | DRG: 190 | End: 2022-02-27
Attending: STUDENT IN AN ORGANIZED HEALTH CARE EDUCATION/TRAINING PROGRAM | Admitting: STUDENT IN AN ORGANIZED HEALTH CARE EDUCATION/TRAINING PROGRAM
Payer: MEDICARE

## 2022-02-25 ENCOUNTER — TELEPHONE (OUTPATIENT)
Dept: FAMILY MEDICINE CLINIC | Age: 72
End: 2022-02-25

## 2022-02-25 ENCOUNTER — APPOINTMENT (OUTPATIENT)
Dept: CT IMAGING | Age: 72
DRG: 190 | End: 2022-02-25
Payer: MEDICARE

## 2022-02-25 DIAGNOSIS — J96.01 ACUTE RESPIRATORY FAILURE WITH HYPOXIA (HCC): Primary | ICD-10-CM

## 2022-02-25 DIAGNOSIS — J44.1 COPD WITH ACUTE EXACERBATION (HCC): ICD-10-CM

## 2022-02-25 DIAGNOSIS — J18.9 COMMUNITY ACQUIRED PNEUMONIA, UNSPECIFIED LATERALITY: ICD-10-CM

## 2022-02-25 PROBLEM — F17.200 TOBACCO DEPENDENCE: Status: ACTIVE | Noted: 2022-02-25

## 2022-02-25 PROBLEM — J20.9 ACUTE BRONCHITIS: Status: ACTIVE | Noted: 2022-02-25

## 2022-02-25 PROBLEM — J96.21 ACUTE ON CHRONIC RESPIRATORY FAILURE WITH HYPOXIA (HCC): Status: ACTIVE | Noted: 2022-02-25

## 2022-02-25 LAB
A/G RATIO: 1.3 (ref 1.1–2.2)
ALBUMIN SERPL-MCNC: 4 G/DL (ref 3.4–5)
ALP BLD-CCNC: 75 U/L (ref 40–129)
ALT SERPL-CCNC: 14 U/L (ref 10–40)
ANION GAP SERPL CALCULATED.3IONS-SCNC: 11 MMOL/L (ref 3–16)
AST SERPL-CCNC: 18 U/L (ref 15–37)
BASOPHILS ABSOLUTE: 0 K/UL (ref 0–0.2)
BASOPHILS RELATIVE PERCENT: 0.8 %
BILIRUB SERPL-MCNC: 0.5 MG/DL (ref 0–1)
BUN BLDV-MCNC: 20 MG/DL (ref 7–20)
CALCIUM SERPL-MCNC: 8.6 MG/DL (ref 8.3–10.6)
CHLORIDE BLD-SCNC: 100 MMOL/L (ref 99–110)
CO2: 25 MMOL/L (ref 21–32)
CREAT SERPL-MCNC: 1.2 MG/DL (ref 0.8–1.3)
EOSINOPHILS ABSOLUTE: 0 K/UL (ref 0–0.6)
EOSINOPHILS RELATIVE PERCENT: 0 %
GFR AFRICAN AMERICAN: >60
GFR NON-AFRICAN AMERICAN: 60
GLUCOSE BLD-MCNC: 110 MG/DL (ref 70–99)
HCT VFR BLD CALC: 36.4 % (ref 40.5–52.5)
HEMOGLOBIN: 12.1 G/DL (ref 13.5–17.5)
LACTIC ACID, SEPSIS: 1.2 MMOL/L (ref 0.4–1.9)
LACTIC ACID, SEPSIS: 1.5 MMOL/L (ref 0.4–1.9)
LYMPHOCYTES ABSOLUTE: 0.9 K/UL (ref 1–5.1)
LYMPHOCYTES RELATIVE PERCENT: 15.3 %
MCH RBC QN AUTO: 28.7 PG (ref 26–34)
MCHC RBC AUTO-ENTMCNC: 33.2 G/DL (ref 31–36)
MCV RBC AUTO: 86.3 FL (ref 80–100)
MONOCYTES ABSOLUTE: 0.5 K/UL (ref 0–1.3)
MONOCYTES RELATIVE PERCENT: 7.8 %
NEUTROPHILS ABSOLUTE: 4.6 K/UL (ref 1.7–7.7)
NEUTROPHILS RELATIVE PERCENT: 76.1 %
PDW BLD-RTO: 15 % (ref 12.4–15.4)
PLATELET # BLD: 179 K/UL (ref 135–450)
PMV BLD AUTO: 8.9 FL (ref 5–10.5)
POTASSIUM REFLEX MAGNESIUM: 4.4 MMOL/L (ref 3.5–5.1)
PRO-BNP: 515 PG/ML (ref 0–124)
RAPID INFLUENZA  B AGN: NEGATIVE
RAPID INFLUENZA A AGN: NEGATIVE
RBC # BLD: 4.22 M/UL (ref 4.2–5.9)
SARS-COV-2, NAAT: NOT DETECTED
SODIUM BLD-SCNC: 136 MMOL/L (ref 136–145)
TOTAL PROTEIN: 7.1 G/DL (ref 6.4–8.2)
TROPONIN: <0.01 NG/ML
WBC # BLD: 6.1 K/UL (ref 4–11)

## 2022-02-25 PROCEDURE — 6360000002 HC RX W HCPCS: Performed by: NURSE PRACTITIONER

## 2022-02-25 PROCEDURE — 96375 TX/PRO/DX INJ NEW DRUG ADDON: CPT

## 2022-02-25 PROCEDURE — 99285 EMERGENCY DEPT VISIT HI MDM: CPT

## 2022-02-25 PROCEDURE — 83605 ASSAY OF LACTIC ACID: CPT

## 2022-02-25 PROCEDURE — 87641 MR-STAPH DNA AMP PROBE: CPT

## 2022-02-25 PROCEDURE — 83880 ASSAY OF NATRIURETIC PEPTIDE: CPT

## 2022-02-25 PROCEDURE — 94761 N-INVAS EAR/PLS OXIMETRY MLT: CPT

## 2022-02-25 PROCEDURE — 1200000000 HC SEMI PRIVATE

## 2022-02-25 PROCEDURE — 87804 INFLUENZA ASSAY W/OPTIC: CPT

## 2022-02-25 PROCEDURE — 87635 SARS-COV-2 COVID-19 AMP PRB: CPT

## 2022-02-25 PROCEDURE — 94640 AIRWAY INHALATION TREATMENT: CPT

## 2022-02-25 PROCEDURE — 2580000003 HC RX 258: Performed by: NURSE PRACTITIONER

## 2022-02-25 PROCEDURE — 6360000002 HC RX W HCPCS: Performed by: PHYSICIAN ASSISTANT

## 2022-02-25 PROCEDURE — 36415 COLL VENOUS BLD VENIPUNCTURE: CPT

## 2022-02-25 PROCEDURE — 96374 THER/PROPH/DIAG INJ IV PUSH: CPT

## 2022-02-25 PROCEDURE — 6360000004 HC RX CONTRAST MEDICATION: Performed by: PHYSICIAN ASSISTANT

## 2022-02-25 PROCEDURE — 71260 CT THORAX DX C+: CPT

## 2022-02-25 PROCEDURE — 6370000000 HC RX 637 (ALT 250 FOR IP): Performed by: PHYSICIAN ASSISTANT

## 2022-02-25 PROCEDURE — 85025 COMPLETE CBC W/AUTO DIFF WBC: CPT

## 2022-02-25 PROCEDURE — 80053 COMPREHEN METABOLIC PANEL: CPT

## 2022-02-25 PROCEDURE — 93005 ELECTROCARDIOGRAM TRACING: CPT | Performed by: PHYSICIAN ASSISTANT

## 2022-02-25 PROCEDURE — 71045 X-RAY EXAM CHEST 1 VIEW: CPT

## 2022-02-25 PROCEDURE — 87040 BLOOD CULTURE FOR BACTERIA: CPT

## 2022-02-25 PROCEDURE — 2580000003 HC RX 258: Performed by: PHYSICIAN ASSISTANT

## 2022-02-25 PROCEDURE — 6370000000 HC RX 637 (ALT 250 FOR IP): Performed by: NURSE PRACTITIONER

## 2022-02-25 PROCEDURE — 84484 ASSAY OF TROPONIN QUANT: CPT

## 2022-02-25 RX ORDER — ACETAMINOPHEN 650 MG/1
650 SUPPOSITORY RECTAL EVERY 6 HOURS PRN
Status: DISCONTINUED | OUTPATIENT
Start: 2022-02-25 | End: 2022-02-27 | Stop reason: HOSPADM

## 2022-02-25 RX ORDER — ONDANSETRON 4 MG/1
4 TABLET, ORALLY DISINTEGRATING ORAL EVERY 8 HOURS PRN
Status: DISCONTINUED | OUTPATIENT
Start: 2022-02-25 | End: 2022-02-27 | Stop reason: HOSPADM

## 2022-02-25 RX ORDER — LACTOBACILLUS RHAMNOSUS GG 10B CELL
1 CAPSULE ORAL
Status: DISCONTINUED | OUTPATIENT
Start: 2022-02-26 | End: 2022-02-27 | Stop reason: HOSPADM

## 2022-02-25 RX ORDER — LANOLIN ALCOHOL/MO/W.PET/CERES
9 CREAM (GRAM) TOPICAL NIGHTLY PRN
Status: DISCONTINUED | OUTPATIENT
Start: 2022-02-25 | End: 2022-02-27 | Stop reason: HOSPADM

## 2022-02-25 RX ORDER — IPRATROPIUM BROMIDE AND ALBUTEROL SULFATE 2.5; .5 MG/3ML; MG/3ML
1 SOLUTION RESPIRATORY (INHALATION) ONCE
Status: COMPLETED | OUTPATIENT
Start: 2022-02-25 | End: 2022-02-25

## 2022-02-25 RX ORDER — POLYETHYLENE GLYCOL 3350 17 G/17G
17 POWDER, FOR SOLUTION ORAL DAILY PRN
Status: DISCONTINUED | OUTPATIENT
Start: 2022-02-25 | End: 2022-02-27 | Stop reason: HOSPADM

## 2022-02-25 RX ORDER — ACETAMINOPHEN 325 MG/1
650 TABLET ORAL EVERY 6 HOURS PRN
Status: DISCONTINUED | OUTPATIENT
Start: 2022-02-25 | End: 2022-02-27 | Stop reason: HOSPADM

## 2022-02-25 RX ORDER — MORPHINE SULFATE 4 MG/ML
4 INJECTION, SOLUTION INTRAMUSCULAR; INTRAVENOUS ONCE
Status: COMPLETED | OUTPATIENT
Start: 2022-02-25 | End: 2022-02-25

## 2022-02-25 RX ORDER — PREDNISONE 20 MG/1
40 TABLET ORAL DAILY
Status: DISCONTINUED | OUTPATIENT
Start: 2022-02-28 | End: 2022-02-27

## 2022-02-25 RX ORDER — NICOTINE 21 MG/24HR
1 PATCH, TRANSDERMAL 24 HOURS TRANSDERMAL DAILY
Status: DISCONTINUED | OUTPATIENT
Start: 2022-02-26 | End: 2022-02-27 | Stop reason: HOSPADM

## 2022-02-25 RX ORDER — ATORVASTATIN CALCIUM 10 MG/1
10 TABLET, FILM COATED ORAL DAILY
Status: DISCONTINUED | OUTPATIENT
Start: 2022-02-26 | End: 2022-02-27 | Stop reason: HOSPADM

## 2022-02-25 RX ORDER — DOXYCYCLINE HYCLATE 100 MG
100 TABLET ORAL 2 TIMES DAILY
Status: DISCONTINUED | OUTPATIENT
Start: 2022-02-25 | End: 2022-02-26

## 2022-02-25 RX ORDER — TAMSULOSIN HYDROCHLORIDE 0.4 MG/1
0.4 CAPSULE ORAL DAILY
Status: DISCONTINUED | OUTPATIENT
Start: 2022-02-26 | End: 2022-02-27 | Stop reason: HOSPADM

## 2022-02-25 RX ORDER — BENZONATATE 100 MG/1
100 CAPSULE ORAL 3 TIMES DAILY PRN
Status: DISCONTINUED | OUTPATIENT
Start: 2022-02-25 | End: 2022-02-27 | Stop reason: HOSPADM

## 2022-02-25 RX ORDER — IPRATROPIUM BROMIDE AND ALBUTEROL SULFATE 2.5; .5 MG/3ML; MG/3ML
1 SOLUTION RESPIRATORY (INHALATION)
Status: DISCONTINUED | OUTPATIENT
Start: 2022-02-26 | End: 2022-02-26

## 2022-02-25 RX ORDER — SODIUM CHLORIDE 0.9 % (FLUSH) 0.9 %
5-40 SYRINGE (ML) INJECTION PRN
Status: DISCONTINUED | OUTPATIENT
Start: 2022-02-25 | End: 2022-02-27 | Stop reason: HOSPADM

## 2022-02-25 RX ORDER — ONDANSETRON 2 MG/ML
4 INJECTION INTRAMUSCULAR; INTRAVENOUS ONCE
Status: COMPLETED | OUTPATIENT
Start: 2022-02-25 | End: 2022-02-25

## 2022-02-25 RX ORDER — LOSARTAN POTASSIUM AND HYDROCHLOROTHIAZIDE 12.5; 5 MG/1; MG/1
1 TABLET ORAL DAILY
Status: DISCONTINUED | OUTPATIENT
Start: 2022-02-26 | End: 2022-02-27 | Stop reason: HOSPADM

## 2022-02-25 RX ORDER — SODIUM CHLORIDE 9 MG/ML
25 INJECTION, SOLUTION INTRAVENOUS PRN
Status: DISCONTINUED | OUTPATIENT
Start: 2022-02-25 | End: 2022-02-27 | Stop reason: HOSPADM

## 2022-02-25 RX ORDER — METHYLPREDNISOLONE SODIUM SUCCINATE 40 MG/ML
40 INJECTION, POWDER, LYOPHILIZED, FOR SOLUTION INTRAMUSCULAR; INTRAVENOUS EVERY 6 HOURS
Status: DISCONTINUED | OUTPATIENT
Start: 2022-02-25 | End: 2022-02-27

## 2022-02-25 RX ORDER — SODIUM CHLORIDE 0.9 % (FLUSH) 0.9 %
5-40 SYRINGE (ML) INJECTION EVERY 12 HOURS SCHEDULED
Status: DISCONTINUED | OUTPATIENT
Start: 2022-02-25 | End: 2022-02-27 | Stop reason: HOSPADM

## 2022-02-25 RX ORDER — ONDANSETRON 2 MG/ML
4 INJECTION INTRAMUSCULAR; INTRAVENOUS EVERY 6 HOURS PRN
Status: DISCONTINUED | OUTPATIENT
Start: 2022-02-25 | End: 2022-02-27 | Stop reason: HOSPADM

## 2022-02-25 RX ORDER — GUAIFENESIN 600 MG/1
600 TABLET, EXTENDED RELEASE ORAL 2 TIMES DAILY
Status: DISCONTINUED | OUTPATIENT
Start: 2022-02-25 | End: 2022-02-27 | Stop reason: HOSPADM

## 2022-02-25 RX ORDER — FEBUXOSTAT 40 MG/1
40 TABLET, FILM COATED ORAL DAILY
Status: DISCONTINUED | OUTPATIENT
Start: 2022-02-26 | End: 2022-02-27 | Stop reason: HOSPADM

## 2022-02-25 RX ORDER — DEXAMETHASONE SODIUM PHOSPHATE 10 MG/ML
8 INJECTION, SOLUTION INTRAMUSCULAR; INTRAVENOUS ONCE
Status: COMPLETED | OUTPATIENT
Start: 2022-02-25 | End: 2022-02-25

## 2022-02-25 RX ADMIN — METHYLPREDNISOLONE SODIUM SUCCINATE 40 MG: 40 INJECTION, POWDER, FOR SOLUTION INTRAMUSCULAR; INTRAVENOUS at 22:18

## 2022-02-25 RX ADMIN — DEXAMETHASONE SODIUM PHOSPHATE 8 MG: 10 INJECTION INTRAMUSCULAR; INTRAVENOUS at 19:27

## 2022-02-25 RX ADMIN — CEFTRIAXONE 1000 MG: 1 INJECTION, POWDER, FOR SOLUTION INTRAMUSCULAR; INTRAVENOUS at 19:34

## 2022-02-25 RX ADMIN — MORPHINE SULFATE 4 MG: 4 INJECTION, SOLUTION INTRAMUSCULAR; INTRAVENOUS at 19:29

## 2022-02-25 RX ADMIN — DOXYCYCLINE HYCLATE 100 MG: 100 TABLET, FILM COATED ORAL at 22:18

## 2022-02-25 RX ADMIN — IPRATROPIUM BROMIDE AND ALBUTEROL SULFATE 1 AMPULE: .5; 3 SOLUTION RESPIRATORY (INHALATION) at 15:22

## 2022-02-25 RX ADMIN — ONDANSETRON 4 MG: 2 INJECTION INTRAMUSCULAR; INTRAVENOUS at 19:24

## 2022-02-25 RX ADMIN — SODIUM CHLORIDE, PRESERVATIVE FREE 10 ML: 5 INJECTION INTRAVENOUS at 21:07

## 2022-02-25 RX ADMIN — ACETAMINOPHEN 650 MG: 325 TABLET ORAL at 22:17

## 2022-02-25 RX ADMIN — AZITHROMYCIN MONOHYDRATE 500 MG: 500 INJECTION, POWDER, LYOPHILIZED, FOR SOLUTION INTRAVENOUS at 20:08

## 2022-02-25 RX ADMIN — BENZONATATE 100 MG: 100 CAPSULE ORAL at 22:17

## 2022-02-25 RX ADMIN — IOPAMIDOL 75 ML: 755 INJECTION, SOLUTION INTRAVENOUS at 17:17

## 2022-02-25 RX ADMIN — GUAIFENESIN 600 MG: 600 TABLET ORAL at 22:18

## 2022-02-25 ASSESSMENT — PAIN SCALES - GENERAL
PAINLEVEL_OUTOF10: 3
PAINLEVEL_OUTOF10: 3
PAINLEVEL_OUTOF10: 1
PAINLEVEL_OUTOF10: 0

## 2022-02-25 NOTE — TELEPHONE ENCOUNTER
WIFE OF PT CALLING    WET PHLEGM COUGH- SO BAD HE IS GAGGING  CLEAR PHLEGM  FEVER 100  LIGHTHEADNESS  SLEEPING A LOT  NO SORE THROAT  HE IS PRONE TO THESE SYMPTOMS    DID A HOME COVID TEST AND CAME BACK NEGATIVE     HAS HAD THIS FOR A FEW DAYS  TOOK 1 TYLENOL LAST NIGHT    WANTS TO KNOW WHAT TO TAKE FOR HIS FEVER WITH ALL OF HIS OTHER MEDICATIONS    WANTS TO KNOW IF HE CAN GET A [facility]    PHARM   [facility] ON [address] [facility]

## 2022-02-25 NOTE — ED NOTES
Ambulated pt with pulse ox and oxygen ranged from 93-88%. Pt placed on 2L NC when returned to ED room and 02 sat immediately went to 100% and then suddenly dropped down to 94%.       Efrain Rodríguez RN  02/25/22 9280

## 2022-02-26 LAB
ANION GAP SERPL CALCULATED.3IONS-SCNC: 14 MMOL/L (ref 3–16)
BASOPHILS ABSOLUTE: 0 K/UL (ref 0–0.2)
BASOPHILS RELATIVE PERCENT: 0.3 %
BUN BLDV-MCNC: 27 MG/DL (ref 7–20)
CALCIUM SERPL-MCNC: 8.2 MG/DL (ref 8.3–10.6)
CHLORIDE BLD-SCNC: 101 MMOL/L (ref 99–110)
CO2: 21 MMOL/L (ref 21–32)
CREAT SERPL-MCNC: 1.3 MG/DL (ref 0.8–1.3)
EOSINOPHILS ABSOLUTE: 0 K/UL (ref 0–0.6)
EOSINOPHILS RELATIVE PERCENT: 0 %
GFR AFRICAN AMERICAN: >60
GFR NON-AFRICAN AMERICAN: 54
GLUCOSE BLD-MCNC: 161 MG/DL (ref 70–99)
HCT VFR BLD CALC: 35.3 % (ref 40.5–52.5)
HEMOGLOBIN: 11.4 G/DL (ref 13.5–17.5)
LACTIC ACID: 1 MMOL/L (ref 0.4–2)
LYMPHOCYTES ABSOLUTE: 1 K/UL (ref 1–5.1)
LYMPHOCYTES RELATIVE PERCENT: 21.9 %
MCH RBC QN AUTO: 28 PG (ref 26–34)
MCHC RBC AUTO-ENTMCNC: 32.2 G/DL (ref 31–36)
MCV RBC AUTO: 86.9 FL (ref 80–100)
MONOCYTES ABSOLUTE: 0.1 K/UL (ref 0–1.3)
MONOCYTES RELATIVE PERCENT: 1.4 %
MRSA SCREEN RT-PCR: NORMAL
NEUTROPHILS ABSOLUTE: 3.4 K/UL (ref 1.7–7.7)
NEUTROPHILS RELATIVE PERCENT: 76.4 %
PDW BLD-RTO: 15.6 % (ref 12.4–15.4)
PLATELET # BLD: 193 K/UL (ref 135–450)
PMV BLD AUTO: 9 FL (ref 5–10.5)
POTASSIUM REFLEX MAGNESIUM: 5 MMOL/L (ref 3.5–5.1)
PROCALCITONIN: 0.33 NG/ML (ref 0–0.15)
RBC # BLD: 4.07 M/UL (ref 4.2–5.9)
SODIUM BLD-SCNC: 136 MMOL/L (ref 136–145)
WBC # BLD: 4.5 K/UL (ref 4–11)

## 2022-02-26 PROCEDURE — 6370000000 HC RX 637 (ALT 250 FOR IP): Performed by: INTERNAL MEDICINE

## 2022-02-26 PROCEDURE — 36415 COLL VENOUS BLD VENIPUNCTURE: CPT

## 2022-02-26 PROCEDURE — 94760 N-INVAS EAR/PLS OXIMETRY 1: CPT

## 2022-02-26 PROCEDURE — 6360000002 HC RX W HCPCS: Performed by: NURSE PRACTITIONER

## 2022-02-26 PROCEDURE — 6370000000 HC RX 637 (ALT 250 FOR IP): Performed by: NURSE PRACTITIONER

## 2022-02-26 PROCEDURE — 83605 ASSAY OF LACTIC ACID: CPT

## 2022-02-26 PROCEDURE — 1200000000 HC SEMI PRIVATE

## 2022-02-26 PROCEDURE — 94640 AIRWAY INHALATION TREATMENT: CPT

## 2022-02-26 PROCEDURE — 85025 COMPLETE CBC W/AUTO DIFF WBC: CPT

## 2022-02-26 PROCEDURE — 2580000003 HC RX 258: Performed by: NURSE PRACTITIONER

## 2022-02-26 PROCEDURE — 84145 PROCALCITONIN (PCT): CPT

## 2022-02-26 PROCEDURE — 2700000000 HC OXYGEN THERAPY PER DAY

## 2022-02-26 PROCEDURE — 99223 1ST HOSP IP/OBS HIGH 75: CPT | Performed by: INTERNAL MEDICINE

## 2022-02-26 PROCEDURE — 80048 BASIC METABOLIC PNL TOTAL CA: CPT

## 2022-02-26 RX ORDER — BUDESONIDE AND FORMOTEROL FUMARATE DIHYDRATE 160; 4.5 UG/1; UG/1
2 AEROSOL RESPIRATORY (INHALATION) 2 TIMES DAILY
Status: DISCONTINUED | OUTPATIENT
Start: 2022-02-26 | End: 2022-02-27 | Stop reason: HOSPADM

## 2022-02-26 RX ORDER — IPRATROPIUM BROMIDE AND ALBUTEROL SULFATE 2.5; .5 MG/3ML; MG/3ML
1 SOLUTION RESPIRATORY (INHALATION) 2 TIMES DAILY
Status: DISCONTINUED | OUTPATIENT
Start: 2022-02-26 | End: 2022-02-27 | Stop reason: HOSPADM

## 2022-02-26 RX ORDER — IPRATROPIUM BROMIDE AND ALBUTEROL SULFATE 2.5; .5 MG/3ML; MG/3ML
1 SOLUTION RESPIRATORY (INHALATION) EVERY 4 HOURS PRN
Status: DISCONTINUED | OUTPATIENT
Start: 2022-02-26 | End: 2022-02-27 | Stop reason: HOSPADM

## 2022-02-26 RX ADMIN — METHYLPREDNISOLONE SODIUM SUCCINATE 40 MG: 40 INJECTION, POWDER, FOR SOLUTION INTRAMUSCULAR; INTRAVENOUS at 21:11

## 2022-02-26 RX ADMIN — SODIUM CHLORIDE, PRESERVATIVE FREE 10 ML: 5 INJECTION INTRAVENOUS at 21:11

## 2022-02-26 RX ADMIN — LOSARTAN POTASSIUM AND HYDROCHLOROTHIAZIDE 1 TABLET: 12.5; 5 TABLET ORAL at 08:41

## 2022-02-26 RX ADMIN — IPRATROPIUM BROMIDE AND ALBUTEROL SULFATE 1 AMPULE: .5; 3 SOLUTION RESPIRATORY (INHALATION) at 20:57

## 2022-02-26 RX ADMIN — IPRATROPIUM BROMIDE AND ALBUTEROL SULFATE 1 AMPULE: .5; 3 SOLUTION RESPIRATORY (INHALATION) at 08:26

## 2022-02-26 RX ADMIN — TAMSULOSIN HYDROCHLORIDE 0.4 MG: 0.4 CAPSULE ORAL at 08:41

## 2022-02-26 RX ADMIN — METHYLPREDNISOLONE SODIUM SUCCINATE 40 MG: 40 INJECTION, POWDER, FOR SOLUTION INTRAMUSCULAR; INTRAVENOUS at 15:02

## 2022-02-26 RX ADMIN — BUDESONIDE AND FORMOTEROL FUMARATE DIHYDRATE 2 PUFF: 160; 4.5 AEROSOL RESPIRATORY (INHALATION) at 11:57

## 2022-02-26 RX ADMIN — DOXYCYCLINE HYCLATE 100 MG: 100 TABLET, FILM COATED ORAL at 08:41

## 2022-02-26 RX ADMIN — ATORVASTATIN CALCIUM 10 MG: 10 TABLET, FILM COATED ORAL at 08:41

## 2022-02-26 RX ADMIN — GUAIFENESIN 600 MG: 600 TABLET ORAL at 21:11

## 2022-02-26 RX ADMIN — LEVOFLOXACIN 750 MG: 500 TABLET, FILM COATED ORAL at 11:30

## 2022-02-26 RX ADMIN — METHYLPREDNISOLONE SODIUM SUCCINATE 40 MG: 40 INJECTION, POWDER, FOR SOLUTION INTRAMUSCULAR; INTRAVENOUS at 04:09

## 2022-02-26 RX ADMIN — SODIUM CHLORIDE, PRESERVATIVE FREE 10 ML: 5 INJECTION INTRAVENOUS at 07:49

## 2022-02-26 RX ADMIN — METHYLPREDNISOLONE SODIUM SUCCINATE 40 MG: 40 INJECTION, POWDER, FOR SOLUTION INTRAMUSCULAR; INTRAVENOUS at 09:00

## 2022-02-26 RX ADMIN — Medication 1 CAPSULE: at 08:41

## 2022-02-26 RX ADMIN — GUAIFENESIN 600 MG: 600 TABLET ORAL at 08:41

## 2022-02-26 RX ADMIN — ENOXAPARIN SODIUM 40 MG: 100 INJECTION SUBCUTANEOUS at 08:41

## 2022-02-26 RX ADMIN — BUDESONIDE AND FORMOTEROL FUMARATE DIHYDRATE 2 PUFF: 160; 4.5 AEROSOL RESPIRATORY (INHALATION) at 20:57

## 2022-02-26 ASSESSMENT — PAIN SCALES - GENERAL
PAINLEVEL_OUTOF10: 0

## 2022-02-26 NOTE — ED PROVIDER NOTES
[address] [facility]        Pt Name: Geovanna Lu  MRN: [id number]  [place] 1950  Date of evaluation: 2/25/2022  Provider: Indu Osuna PA-C  PCP: Romayne Maple, MD  Note Started: 7:18 PM EST      CAMILO. I have evaluated this patient. My supervising physician was available for consultation. Triage CHIEF COMPLAINT       Chief Complaint   Patient presents with    Cough     went to PCP's office for a productive cough over the past couple of days, with somewhat of SOB. sent here for hypoxic reading of 85%         HISTORY OF PRESENT ILLNESS   (Location/Symptom, Timing/Onset, Context/Setting, Quality, Duration, Modifying Factors, Severity)  Note limiting factors. Chief Complaint: Sent in from family doctor because of hypoxia in the upper 80s on room air    Geovanna Lu is a 80 y.o. male who presents indicating that his family doctor told him to come in to be evaluated and treated in the emergency department. Reportedly patient has had a cough worse than usual productive with mucus over the course of the last couple of weeks. Then he has been feeling worse at least for the last couple of days or so with feeling feverish, overwhelming fatigue, and short of breath especially with activity. He is coughing constantly and does bring up some mucus. Patient indicates that he is hurting somewhat in the left lower ribs area worse with coughing and better at rest.  He has not taken any medication for this so far today. Also he took no fever reducer today. Patient is a 2 pack-a-day smoker and has been for a good number of years but has never been diagnosed with COPD and is not on any daily medications for breathing. No recent extremity swelling or weakness or loss of sensation or movement of the extremities. Patient did take a home COVID-19 test this morning which was negative.   He indicates that he did have 3 COVID-19 immunizations previously. On evaluation in the family doctor's office patient was found to have O2 saturation of 85% on room air and was directed to the ER. Nursing Notes were all reviewed and agreed with or any disagreements were addressed in the HPI. REVIEW OF SYSTEMS    (2-9 systems for level 4, 10 or more for level 5)     Review of Systems  Positive history as above with fevers and chills productive purulent cough, fatigue and left lower posterior ribs tenderness worse on coughing and better rest.  No headache or vision change or neck pain or stiffness. No abdominal pain nausea or vomiting but positive for decreased appetite. No burning in urination or difficulty passing urine or acute stool change. No extremity acute loss of sensation or range of motion or new edema.       PAST MEDICAL HISTORY     Past Medical History:   Diagnosis Date    Hydradenitis     Lymphedema        SURGICAL HISTORY     Past Surgical History:   Procedure Laterality Date    BACK SURGERY  2002       CURRENTMEDICATIONS       Previous Medications    DAPSONE 25 MG TABLET        EMOLLIENT (DERMAPHOR) OINT OINTMENT    Apply topically 4 times daily as needed (dry skin or irritation)    FEBUXOSTAT (ULORIC) 40 MG TABS TABLET    TAKE ONE TABLET BY MOUTH DAILY    LOSARTAN-HYDROCHLOROTHIAZIDE (HYZAAR) 50-12.5 MG PER TABLET    Take 1 tablet by mouth daily    LOVASTATIN (MEVACOR) 40 MG TABLET    TAKE ONE TABLET BY MOUTH DAILY    NAPROXEN (NAPROSYN) 375 MG TABLET    TAKE ONE TABLET BY MOUTH TWICE A DAY WITH MEALS    TAMSULOSIN (FLOMAX) 0.4 MG CAPSULE    TAKE ONE CAPSULE BY MOUTH DAILY       ALLERGIES     Allopurinol    FAMILYHISTORY       Family History   Problem Relation Age of Onset    Heart Disease Mother     Rheum Arthritis Mother         SOCIAL HISTORY       Social History     Socioeconomic History    Marital status:      Spouse name: None    Number of children: None    Years of education: None    Highest education level: None Occupational History    None   Tobacco Use    Smoking status: Current Every Day Smoker     Packs/day: 1.50     Years: 50.00     Pack years: 75.00     Types: Cigarettes     Start date: 1/1/1967    Smokeless tobacco: Never Used   Vaping Use    Vaping Use: Never used   Substance and Sexual Activity    Alcohol use: Not Currently    Drug use: Never    Sexual activity: Yes     Partners: Female   Other Topics Concern    None   Social History Narrative    Originally from the area    Retired from [address] [employer] work    Two boys, 6 grandchildren    Lives with wifeNasra     Social Determinants of Health     Financial Resource Strain:     Difficulty of Paying Living Expenses: Not on file   Food Insecurity:     Worried About [address] in the Last Year: Not on file    [address] in the Last Year: Not on file   Transportation Needs:     Lack of Transportation (Medical): Not on file    Lack of Transportation (Non-Medical):  Not on file   Physical Activity:     Days of Exercise per Week: Not on file    Minutes of Exercise per Session: Not on file   Stress:     Feeling of Stress : Not on file   Social Connections:     Frequency of Communication with Friends and Family: Not on file    Frequency of Social Gatherings with Friends and Family: Not on file    Attends Rastafari Services: Not on file    Active Member of [address] [place] [address] or Organizations: Not on file    Attends Club or Organization Meetings: Not on file    Marital Status: Not on file   Intimate Partner Violence:     Fear of Current or Ex-Partner: Not on file    Emotionally Abused: Not on file    Physically Abused: Not on file    Sexually Abused: Not on file   Housing Stability:     Unable to Pay for Housing in the Last Year: Not on file    Number of [place] in the Last Year: Not on file    Unstable Housing in the Last Year: Not on file       SCREENINGS    Cam Coma Scale  Eye Opening: Spontaneous  Best Verbal Response: Oriented  Best Motor Response: Obeys commands  [place] Coma Scale Score: 15        PHYSICAL EXAM    (up to 7 for level 4, 8 or more for level 5)     ED Triage Vitals [02/25/22 1436]   BP Temp Temp src Pulse Resp SpO2 Height Weight   125/71 97.8 °F (36.6 °C) -- 104 20 90 % 5' 6\" (1.676 m) --       Physical Exam  Vitals and nursing note reviewed. Constitutional:       Appearance: He is obese. He is ill-appearing. He is not diaphoretic. HENT:      Head: Normocephalic and atraumatic. Right Ear: External ear normal.      Left Ear: External ear normal.      Nose: Nose normal. No rhinorrhea. Eyes:      General:         Right eye: No discharge. Left eye: No discharge. Conjunctiva/sclera: Conjunctivae normal.   Cardiovascular:      Rate and Rhythm: Normal rate and regular rhythm. Pulses: Normal pulses. Heart sounds: Normal heart sounds. No murmur heard. No gallop. Pulmonary:      Effort: Accessory muscle usage and prolonged expiration present. No retractions. Breath sounds: Decreased air movement present. Decreased breath sounds, wheezing and rhonchi present. Abdominal:      General: Bowel sounds are normal. There is no distension. Palpations: Abdomen is soft. Tenderness: There is no abdominal tenderness. There is no right CVA tenderness or left CVA tenderness. Musculoskeletal:         General: No swelling, deformity or signs of injury. Normal range of motion. Cervical back: Normal, normal range of motion and neck supple. No rigidity, tenderness or bony tenderness. Thoracic back: Normal. Normal range of motion. Lumbar back: Normal.        Back:       Right lower leg: No edema. Left lower leg: No edema. Comments: Left flank/ribs tenderness as depicted   Lymphadenopathy:      Cervical: No cervical adenopathy. Skin:     General: Skin is warm and dry. Capillary Refill: Capillary refill takes less than 2 seconds. Findings: No rash. Neurological:      Mental Status: He is alert and oriented to person, place, and time. Mental status is at baseline. Psychiatric:         Mood and Affect: Mood normal.         Behavior: Behavior normal.         DIAGNOSTIC RESULTS   LABS:    Labs Reviewed   CBC WITH AUTO DIFFERENTIAL - Abnormal; Notable for the following components:       Result Value    Hemoglobin 12.1 (*)     Hematocrit 36.4 (*)     Lymphocytes Absolute 0.9 (*)     All other components within normal limits    Narrative:     Performed at:  [address] [email or web address]   Phone [phone]   COMPREHENSIVE METABOLIC PANEL W/ REFLEX TO MG FOR LOW K - Abnormal; Notable for the following components:    Glucose 110 (*)     GFR Non- 60 (*)     All other components within normal limits    Narrative:     Performed at:  [address] [email or web address]   Phone [phone]   BRAIN NATRIURETIC PEPTIDE - Abnormal; Notable for the following components:    Pro- (*)     All other components within normal limits    Narrative:     Performed at:  [address] [email or web address]   Phone [phone]   CULTURE, BLOOD 1   CULTURE, BLOOD 2   TROPONIN    Narrative:     Performed at:  [address] [email or web address]   Phone [phone]   LACTATE, SEPSIS    Narrative:     Performed at:  [facility]  [address] [place] [address] [email or web address]   Phone [phone]   LACTATE, SEPSIS    Narrative:     Performed at:  [address] [place]   Phone [phone]       When ordered, only abnormal lab results are displayed. All other labs were within normal range or not returned as of this dictation. EKG:  When ordered, EKG's are interpreted by the Emergency Department Physician in the absence of a cardiologist.  Please see their note for interpretation of EKG. RADIOLOGY:   Non-plain film images such as CT, Ultrasound and MRI are read by the radiologist. Plain radiographic images are visualized andpreliminarily interpreted by the  ED Provider with the below findings:        Interpretation perthe Radiologist below, if available at the time of this note:    CT CHEST PULMONARY EMBOLISM W CONTRAST   Final Result   1. Respiratory motion artifact complicates assessment of distal pulmonary   arterial branches. Inter septal emboli not excluded. No central or lobar   emboli. 2.  Emphysematous changes in the lungs. 3.  Bilateral hypodense adrenal masses likely adenomas requiring no further   follow-up. RECOMMENDATIONS:   Unavailable         XR CHEST PORTABLE   Final Result   No radiographic evidence of acute pulmonary disease. XR CHEST PORTABLE    Result Date: 2/25/2022  EXAMINATION: ONE XRAY VIEW OF THE CHEST 2/25/2022 4:20 pm COMPARISON: None. HISTORY: ORDERING SYSTEM PROVIDED HISTORY: dyspnea TECHNOLOGIST PROVIDED HISTORY: Reason for exam:->dyspnea Reason for Exam: Cough FINDINGS: HEART/MEDIASTINUM: The cardiomediastinal silhouette is within normal limits. PLEURA/LUNGS: There are no focal consolidations or pleural effusions. There is no appreciable pneumothorax. BONES/SOFT TISSUE: No acute abnormality. No radiographic evidence of acute pulmonary disease. CT CHEST PULMONARY EMBOLISM W CONTRAST    Result Date: 2/25/2022  EXAMINATION: CTA OF THE CHEST 2/25/2022 5:01 pm TECHNIQUE: CTA of the chest was performed after the administration of intravenous contrast.  Multiplanar reformatted images are provided for review. MIP images are provided for review.  Dose modulation, iterative reconstruction, and/or weight based adjustment of the mA/kV was utilized to reduce the radiation dose to as low as reasonably achievable. COMPARISON: None. HISTORY: ORDERING SYSTEM PROVIDED HISTORY: Dyspnea; rule out PE secondary to chest x-ray negative with borderline O2 sat TECHNOLOGIST PROVIDED HISTORY: Reason for exam:->Dyspnea; rule out PE secondary to chest x-ray negative with borderline O2 sat Decision Support Exception - unselect if not a suspected or confirmed emergency medical condition->Emergency Medical Condition (MA) Reason for Exam: Dyspnea; rule out PE secondary to chest x-ray negative with borderline O2 sat 75 pack-year history of smoking FINDINGS: Pulmonary Arteries: Respiratory motion artifact complicates assessment of the interseptal branches. Distal inter septal emboli not excludable. However, opacification is adequate. No filling defects in the main or lobar branches diagnostic of pulmonary emboli. Pulmonary artery is normal in size. Mediastinum: No mediastinal adenopathy, acute aortic abnormality, cardiomegaly, pericardial effusion or epicardial adenopathy. Mild calcification of the coronary arteries is demonstrated. Lungs/pleura: Emphysematous changes are present in the lungs without focal consolidation, effusion or pneumothorax. No significant nodules. Tracheobronchial tree is patent. Upper Abdomen: 18 mm right adrenal mass with Hounsfield numbers most compatible with adenoma. Similar 12 mm left adrenal mass. Soft Tissues/Bones: Multilevel degenerative changes in the spine. No acute osseous destructive process or axillary adenopathy. 1.  Respiratory motion artifact complicates assessment of distal pulmonary arterial branches. Inter septal emboli not excluded. No central or lobar emboli. 2.  Emphysematous changes in the lungs. 3.  Bilateral hypodense adrenal masses likely adenomas requiring no further follow-up.  RECOMMENDATIONS: Unavailable         PROCEDURES   Unless otherwise noted below, none     Procedures    CRITICAL CARE TIME   Critical Care  There was a high probability of life-threatening clinical deterioration in the patient's condition requiring my urgent intervention. Total critical care time with the patient was 45 minutes excluding separately reportable procedures. Critical care required due to patients acute respiratory failure with hypoxia requiring oxygen supplementation and medical management of COPD and suspected community-acquired pneumonia including inpatient admission. CONSULTS:  None      EMERGENCY DEPARTMENT COURSE and DIFFERENTIAL DIAGNOSIS/MDM:   Vitals:    Vitals:    02/25/22 1436 02/25/22 1438 02/25/22 1440 02/25/22 1523   BP: 125/71      Pulse: 104  99    Resp: 20   18   Temp: 97.8 °F (36.6 °C)      SpO2: 90% 92%  91%   Height: 5' 6\" (1.676 m)          Patient was given thefollowing medications:  Medications   morphine (PF) injection 4 mg (has no administration in time range)   ondansetron (ZOFRAN) injection 4 mg (has no administration in time range)   cefTRIAXone (ROCEPHIN) 1000 mg IVPB in 50 mL D5W minibag (has no administration in time range)   azithromycin (ZITHROMAX) 500 mg in D5W 250ml addavial (has no administration in time range)   dexamethasone (PF) (DECADRON) injection 8 mg (has no administration in time range)   ipratropium-albuterol (DUONEB) nebulizer solution 1 ampule (1 ampule Inhalation Given 2/25/22 1522)   iopamidol (ISOVUE-370) 76 % injection 75 mL (75 mLs IntraVENous Given 2/25/22 1717)         This patient presents as above and evaluation and treatment is begun here including EKG interpreted by ED physician and the chest x-ray which returns as above. Patient is given a breathing treatment here in the emergency department with no obvious improvement. On ambulation patient oxygenates down to 88% on room air. Therefore patient is placed on 2 L O2 supplementation via nasal cannula. He has frequent cough which he reports to be productive at home along with fever fatigue and malaise.   CT chest rule out PE is obtained and confirms emphysema but does not show any infiltrates. It i also negative for clots. Still patient's symptoms are consistent with community-acquired pneumonia as well as COPD with acute exacerbation. No lactic acid elevation or elevated white cell count. No suspicion of sepsis at this time. Blood cultures obtained and IV antibiotics begun as well as a dose of steroids to help with the inflammatory COPD/emphysema component. Consultation to the hospitalist placed. Patient is in fair condition at this time. FINAL IMPRESSION      1. Acute respiratory failure with hypoxia ([address]    2. Community acquired pneumonia, unspecified laterality    3. COPD with acute exacerbation [facility])          DISPOSITION/PLAN   DISPOSITION Decision To Admit 02/25/2022 07:10:43 PM      PATIENT REFERREDTO:  No follow-up provider specified.     DISCHARGE MEDICATIONS:  New Prescriptions    No medications on file       DISCONTINUED MEDICATIONS:  Discontinued Medications    No medications on file              (Please note that portions ofthis note were completed with a voice recognition program.  Efforts were made to edit the dictations but occasionally words are mis-transcribed.)    Ronny Graves PA-C (electronically signed)             Ronny Graves PA-C  02/25/22 1934

## 2022-02-26 NOTE — RT PROTOCOL NOTE
enter or revise RT Bronchodilator order(s) to two equivalent RT bronchodilator orders with one order with BID Frequency and one order with Frequency of every 4 hours PRN wheezing or increased work of breathing using Per Protocol order mode. 7-10  enter or revise RT Bronchodilator order(s) to two equivalent RT bronchodilator orders with one order with TID Frequency and one order with Frequency of every 4 hours PRN wheezing or increased work of breathing using Per Protocol order mode. 11-13  enter or revise RT Bronchodilator order(s) to one equivalent RT bronchodilator order with QID Frequency and an Albuterol order with Frequency of every 4 hours PRN wheezing or increased work of breathing using Per Protocol order mode. Greater than 13  enter or revise RT Bronchodilator order(s) to one equivalent RT bronchodilator order with every 4 hours Frequency and an Albuterol order with Frequency of every 2 hours PRN wheezing or increased work of breathing using Per Protocol order mode. RT to enter RT Home Evaluation for COPD & MDI Assessment order using Per Protocol order mode.     Electronically signed by Leroy Sánchez RCP on 2/26/2022 at 10:28 AM

## 2022-02-26 NOTE — PROGRESS NOTES
Medication Reconciliation     List of medications patient is currently taking is complete. Source of information:   1. Conversation with patient at bedside  2. EPIC records        Notes regarding home medications:  1. Patient received NONE of his home medications today. 2. Patient took all of his meds yesterday. 3. Added dapsone instructions: 2BID  4. No OTC or supplement use.     Mila Garduno, Pharmacy Intern  2/25/2022 7:23 PM

## 2022-02-26 NOTE — ED NOTES
np made aware that rocephin became unhooked, unknown how much antibiotic that patient received.       Evelia Arrington RN  02/25/22 2004

## 2022-02-26 NOTE — CONSULTS
Pulmonary Consult Note     Patient's name:  Ketan Bradshaw  Medical Record Number: [id number]  Patient's account/billing number: [de-identified]  Patient's YOB: 1950  Age: 80 y.o. Date of Admission: 2/25/2022  2:39 PM  Date of Consult: 2/26/2022      Primary Care Physician: Westley Whitfield MD      Code Status: Full Code    Reason for consult: copd with acute exacerbation     Assessment and Plan     1. Acute respiratory failure with hypoxia O2 saturation less than 90 on room air. 2. COPD with acute exacerbation. 3. Heavy tobacco abuse. 4. Obesity. 5. Obstructive sleep apnea on CPAP therapy. Plan:  Clinically significant COPD no PFT on file. Systemic steroids with gradual taper. Levaquin x5 days. Symbicort twice daily. DuoNeb 4 times daily. On discharge will prescribe Breztri BID and nebulizer   Nicotine patch   We will arrange for outpatient PFT. Was strongly encouraged to quit smoking. Wean off oxygen as tolerated keep sats more than 88%      HISTORY OF PRESENT ILLNESS:   Mr./Ms. Ketan Bradshaw is a 80 y.o. gentleman with past medical history stated below significant for heavy chronic tobacco abuse with more than 50-pack-year history of smoking currently smokes 2 packs a day, history of COPD not on any inhalers chronically, hypertension, obstructive sleep apnea on CPAP therapy presented to the hospital with worsening shortness of breath associated with cough productive of large amount of sputum difficulty expectorating, found hypoxic with O2 saturation of 85% on room air. Procalcitonin elevated at 0.33. CT chest with emphysema, no masses no effusion no infiltrates. Past Medical History:        Diagnosis Date    Hydradenitis     Lymphedema        Past Surgical History:        Procedure Laterality Date    BACK SURGERY  2002       Allergies:     Allergies   Allergen Reactions    Allopurinol Rash         Home Meds:   Prior to Admission medications    Medication Sig Start Date End Date Taking? Authorizing Provider   tamsulosin (FLOMAX) 0.4 MG capsule TAKE ONE CAPSULE BY MOUTH DAILY 2/14/22  Yes JAYRO Lovett - CNP   naproxen (NAPROSYN) 375 MG tablet TAKE ONE TABLET BY MOUTH TWICE A DAY WITH MEALS 1/3/22  Yes Zackary Huerta MD   lovastatin (MEVACOR) 40 MG tablet TAKE ONE TABLET BY MOUTH DAILY 11/3/21  Yes Zackary Huerta MD   febuxostat (ULORIC) 40 MG TABS tablet TAKE ONE TABLET BY MOUTH DAILY 11/3/21  Yes Zackary Huerta MD   dapsone 25 MG tablet Take 25 mg by mouth Take two tablets by mouth twice a day 8/12/21  Yes Historical Provider, MD   losartan-hydroCHLOROthiazide Marda Sarah) 50-12.5 MG per tablet Take 1 tablet by mouth daily 10/14/21  Yes Britni Loyd MD       Family History:       Problem Relation Age of Onset    Heart Disease Mother     Rheum Arthritis Mother          Social History:   TOBACCO:   reports that he has been smoking cigarettes. He started smoking about 55 years ago. He has a 75.00 pack-year smoking history. He has never used smokeless tobacco.  ETOH:   reports previous alcohol use. DRUGS:  reports no history of drug use. REVIEW OF SYSTEMS:  Review of Systems -   General ROS: negative  Psychological ROS: negative  Ophthalmic ROS: negative  ENT ROS: negative  Allergy and Immunology ROS: negative  Hematological and Lymphatic ROS: negative  Endocrine ROS: negative  Breast ROS: negative  Respiratory ROS: Cough, wheezing, shortness of breath. Large amount of sputum.   Cardiovascular ROS: no chest pain or dyspnea on exertion  Gastrointestinal ROS:negative  Genito-Urinary ROS: negative  Musculoskeletal ROS: negative  Neurological ROS: negative  Dermatological ROS: negative        Physical Exam:    Vitals: /68   Pulse 76   Temp 98.2 °F (36.8 °C) (Oral)   Resp 18   Ht 5' 6\" (1.676 m)   Wt 223 lb 15.8 oz (101.6 kg)   SpO2 92%   BMI 36.15 kg/m²     Last Body weight:   Wt Readings from Last 3 Encounters:   02/26/22 223 lb 15.8 oz (101.6 kg)   02/25/22 224 lb 3.2 oz (101.7 kg)   11/09/21 226 lb 3.2 oz (102.6 kg)       Body Mass Index : Body mass index is 36.15 kg/m². Intake and Output summary:     Intake/Output Summary (Last 24 hours) at 2/26/2022 1053  Last data filed at 2/26/2022 0770  Gross per 24 hour   Intake 720 ml   Output 300 ml   Net 420 ml       Physical Examination:     PHYSICAL EXAM:    Gen: Mild acute distress  Eyes: PERRL. Anicteric sclera. No conjunctival injection. ENT: No discharge. Posterior oropharynx clear. External appearance of ears and nose normal.  Neck: Trachea midline. No mass, no lymphadenopathy    Resp:  *Severely diminished breath sounds bilaterally with prolonged expiratory phase and scattered wheezing  CV: Regular rate. Regular rhythm. No murmur or rub. No edema. GI: Soft, Non-tender. Non-distended. +BS  Skin: Warm, dry, w/o erythema. Lymph: No cervical or supraclavicular LAD. M/S: No cyanosis. No clubbing. Neuro:  CN 2-12 tested, no focal neurologic deficit. Moves all extremities  Psych: Awake and alert, Oriented x 3. Judgement and insight appropriate. Mood stable. Laboratory findings:-    CBC:   Recent Labs     02/26/22  0744   WBC 4.5   HGB 11.4*        BMP:    Recent Labs     02/25/22  1525 02/25/22  1525 02/26/22  0744      < > 136   K 4.4   < > 5.0      < > 101   CO2 25   < > 21   BUN 20   < > 27*   CREATININE 1.2  --  1.3   GLUCOSE 110*   < > 161*    < > = values in this interval not displayed. S. Calcium:  Recent Labs     02/26/22  0744   CALCIUM 8.2*     S. Ionized Calcium:No results for input(s): IONCA in the last 72 hours. S. Magnesium:No results for input(s): MG in the last 72 hours. S. Phosphorus:No results for input(s): PHOS in the last 72 hours. S. Glucose:No results for input(s): POCGLU in the last 72 hours. Glycosylated hemoglobin A1C: No results for input(s): LABA1C in the last 72 hours.   INR: No results for input(s): INR in the last 72 hours. Hepatic functions:   Recent Labs     02/25/22  1525   ALKPHOS 75   ALT 14   AST 18   PROT 7.1   BILITOT 0.5   LABALBU 4.0     Pancreatic functions:  Recent Labs     02/26/22  0744   LACTA 1.0     S. Lactic Acid:   Recent Labs     02/26/22  0744   LACTA 1.0     Cardiac enzymes:  Recent Labs     02/25/22  1525   TROPONINI <0.01     BNP:No results for input(s): BNP in the last 72 hours. Lipid profile: No results for input(s): CHOL, TRIG, HDL, LDL, LDLCALC in the last 72 hours. Blood Gases: No results found for: PH, PCO2, PO2, HCO3, O2SAT  Thyroid functions: No results found for: T4, TSH       Radiology Review:  Pertinent images / reports were reviewed as a part of this visit. CT Chest w/ contrast: No results found for this or any previous visit. CT Chest w/o contrast: No results found for this or any previous visit. CTPA: Results for orders placed during the hospital encounter of 02/25/22    CT CHEST PULMONARY EMBOLISM W CONTRAST    Narrative  EXAMINATION:  CTA OF THE CHEST 2/25/2022 5:01 pm    TECHNIQUE:  CTA of the chest was performed after the administration of intravenous  contrast.  Multiplanar reformatted images are provided for review. MIP  images are provided for review. Dose modulation, iterative reconstruction,  and/or weight based adjustment of the mA/kV was utilized to reduce the  radiation dose to as low as reasonably achievable. COMPARISON:  None.     HISTORY:  ORDERING SYSTEM PROVIDED HISTORY: Dyspnea; rule out PE secondary to chest  x-ray negative with borderline O2 sat  TECHNOLOGIST PROVIDED HISTORY:  Reason for exam:->Dyspnea; rule out PE secondary to chest x-ray negative with  borderline O2 sat  Decision Support Exception - unselect if not a suspected or confirmed  emergency medical condition->Emergency Medical Condition (MA)  Reason for Exam: Dyspnea; rule out PE secondary to chest x-ray negative with  borderline O2 sat    75 pack-year history of smoking    FINDINGS:  Pulmonary Arteries: Respiratory motion artifact complicates assessment of the  interseptal branches. Distal inter septal emboli not excludable. However,  opacification is adequate. No filling defects in the main or lobar branches  diagnostic of pulmonary emboli. Pulmonary artery is normal in size. Mediastinum: No mediastinal adenopathy, acute aortic abnormality,  cardiomegaly, pericardial effusion or epicardial adenopathy. Mild  calcification of the coronary arteries is demonstrated. Lungs/pleura: Emphysematous changes are present in the lungs without focal  consolidation, effusion or pneumothorax. No significant nodules. Tracheobronchial tree is patent. Upper Abdomen: 18 mm right adrenal mass with Hounsfield numbers most  compatible with adenoma. Similar 12 mm left adrenal mass. Soft Tissues/Bones: Multilevel degenerative changes in the spine. No acute  osseous destructive process or axillary adenopathy. Impression  1. Respiratory motion artifact complicates assessment of distal pulmonary  arterial branches. Inter septal emboli not excluded. No central or lobar  emboli. 2.  Emphysematous changes in the lungs. 3.  Bilateral hypodense adrenal masses likely adenomas requiring no further  follow-up. RECOMMENDATIONS:  Unavailable      CXR PA/LAT: No results found for this or any previous visit. CXR portable: Results for orders placed during the hospital encounter of 02/25/22    XR CHEST PORTABLE    Narrative  EXAMINATION:  ONE XRAY VIEW OF THE CHEST    2/25/2022 4:20 pm    COMPARISON:  None. HISTORY:  ORDERING SYSTEM PROVIDED HISTORY: dyspnea  TECHNOLOGIST PROVIDED HISTORY:  Reason for exam:->dyspnea  Reason for Exam: Cough    FINDINGS:  HEART/MEDIASTINUM: The cardiomediastinal silhouette is within normal limits. PLEURA/LUNGS: There are no focal consolidations or pleural effusions. There  is no appreciable pneumothorax.     BONES/SOFT TISSUE: No acute abnormality. Impression  No radiographic evidence of acute pulmonary disease.                      Yumiko Shearer MD, M.VEENA.            2/26/2022, 10:53 AM

## 2022-02-26 NOTE — RT PROTOCOL NOTE
RT Nebulizer Bronchodilator Protocol Note    There is a bronchodilator order in the chart from a provider indicating to follow the RT Bronchodilator Protocol and there is an Initiate RT Bronchodilator Protocol order as well (see protocol at bottom of note). CXR Findings:  XR CHEST PORTABLE    Result Date: 2/25/2022  No radiographic evidence of acute pulmonary disease. The findings from the last RT Protocol Assessment were as follows:  Smoking: Chronic pulmonary disease  Respiratory Pattern: Regular pattern and RR 12-20 bpm  Breath Sounds: Slightly diminished and/or crackles  Cough: Strong, productive  Indication for Bronchodilator Therapy: Decreased or absent breath sounds,On home bronchodilators  Bronchodilator Assessment Score: 5    Aerosolized bronchodilator medication orders have been revised according to the RT Nebulizer Bronchodilator Protocol below. Respiratory Therapist to perform RT Therapy Protocol Assessment initially then follow the protocol. Repeat RT Therapy Protocol Assessment PRN for score 0-3 or on second treatment, BID, and PRN for scores above 3. No Indications  adjust the frequency to every 6 hours PRN wheezing or bronchospasm, if no treatments needed after 48 hours then discontinue using Per Protocol order mode. If indication present, adjust the RT bronchodilator orders based on the Bronchodilator Assessment Score as indicated below. If a patient is on this medication at home then do not decrease Frequency below that used at home. 0-3  enter or revise RT bronchodilator order(s) to equivalent RT Bronchodilator order with Frequency of every 4 hours PRN for wheezing or increased work of breathing using Per Protocol order mode.        4-6  enter or revise RT Bronchodilator order(s) to two equivalent RT bronchodilator orders with one order with BID Frequency and one order with Frequency of every 4 hours PRN wheezing or increased work of breathing using Per Protocol order mode.         7-10  enter or revise RT Bronchodilator order(s) to two equivalent RT bronchodilator orders with one order with TID Frequency and one order with Frequency of every 4 hours PRN wheezing or increased work of breathing using Per Protocol order mode. 11-13  enter or revise RT Bronchodilator order(s) to one equivalent RT bronchodilator order with QID Frequency and an Albuterol order with Frequency of every 4 hours PRN wheezing or increased work of breathing using Per Protocol order mode. Greater than 13  enter or revise RT Bronchodilator order(s) to one equivalent RT bronchodilator order with every 4 hours Frequency and an Albuterol order with Frequency of every 2 hours PRN wheezing or increased work of breathing using Per Protocol order mode. RT to enter RT Home Evaluation for COPD & MDI Assessment order using Per Protocol order mode.     Electronically signed by Regine Briggs RCP on 2/26/2022 at 11:01 AM

## 2022-02-26 NOTE — PROGRESS NOTES
Soft, non-tender, non-distended with normal bowel sounds. Musculoskeletal: No clubbing, cyanosis or edema bilaterally. Full range of motion without deformity. Skin: Skin color, texture, turgor normal.  No rashes or lesions. Neurologic:  Neurovascularly intact without any focal sensory/motor deficits. Cranial nerves: II-XII intact, grossly non-focal.  Psychiatric: Alert and oriented, thought content appropriate, normal insight  Capillary Refill: Brisk,3 seconds, normal   Peripheral Pulses: +2 palpable, equal bilaterally       Labs:   Recent Labs     02/25/22  1525 02/26/22  0744   WBC 6.1 4.5   HGB 12.1* 11.4*   HCT 36.4* 35.3*    193     Recent Labs     02/25/22  1525 02/26/22  0744    136   K 4.4 5.0    101   CO2 25 21   BUN 20 27*   CREATININE 1.2 1.3   CALCIUM 8.6 8.2*     Recent Labs     02/25/22  1525   AST 18   ALT 14   BILITOT 0.5   ALKPHOS 75     No results for input(s): INR in the last 72 hours. Recent Labs     02/25/22  1525   TROPONINI <0.01       Urinalysis:    No results found for: Cathalene Bookman, BACTERIA, RBCUA, BLOODU, SPECGRAV, GLUCOSEU    Radiology:  CT CHEST PULMONARY EMBOLISM W CONTRAST   Final Result   1. Respiratory motion artifact complicates assessment of distal pulmonary   arterial branches. Inter septal emboli not excluded. No central or lobar   emboli. 2.  Emphysematous changes in the lungs. 3.  Bilateral hypodense adrenal masses likely adenomas requiring no further   follow-up. RECOMMENDATIONS:   Unavailable         XR CHEST PORTABLE   Final Result   No radiographic evidence of acute pulmonary disease. Assessment/Plan:    -Acute respiratory failure with hypoxia. . Oxygen saturation of 85% on room air with respiratory distress documented by PCP. Apoorva Rued Currently on 2 L nasal cannula--continue supplemental oxygen and wean as tolerated--home O2 eval prior to discharge    -COPD with acute exacerbation. New diagnosis. . Diagnosis based on imaging with emphysema. . Needs PFTs outpatient. .. Continue steroids, bronchodilators and antibiotics. . Pulmonology consulted    -Chronic tobacco abuse--smokes 2 pack/day--smoking cessation counseling provided, continue nicotine patches    -Bilateral adrenal masses, likely adenomas noted on CT chest-this was also documented on CT abdomen in [id number] outpatient with PCP     -Obesity with a BMI of 36--continue low-carb diet    -Obstructive sleep apnea--continue CPAP at bedtime    -Hyperlipidemiacontinue statin    -Essential hypertension-stablecontinue losartan/HCTZ    DVT Prophylaxis: Lovenox  Diet: ADULT DIET; Regular; 4 carb choices (60 gm/meal);  Low Fat/Low Chol/High Fiber/2 gm Na  Code Status: Full Code        All Duran MD

## 2022-02-26 NOTE — H&P
Hospital Medicine History & Physical      PCP: Fernanda Andrade MD    Date of Admission: 2/25/2022    Date of Service: Pt seen/examined on 2/25/2022 and Admitted to Inpatient     Chief Complaint: Shortness of breath, productive cough, congestion      History Of Present Illness: The patient is a 80 y.o. male who presents to [facility] with PMHx: Hidradenitis, lymphedema, BPH, HTN, HLD, STEPHEN with CPAP, obese, gout    Positive tobacco smoker 2 pack a day x 50 years+  No history of COPD or emphysema formal diagnoses  Not oxygen dependent  COVID vaccinated with booster: Antonio Huntley  Retired from [employer] at home with his wife  CODE STATUS full    [facility] and phlegm production. He was found to be hypoxic on 85% room air. Positive for wheezing. Sent to the ED for further evaluation for acute respiratory failure with possible pneumonia and acute bronchitis. Patient reportedly took a home Covid test which was negative. ED work-up: Afebrile, 90% on room air however placed on 2 L, CBC negative for leukocytosis or left shift. Metabolic panel unremarkable. , lactic acid 1.2-> to 1.5, blood cultures x2 were drawn, CT chest negative for consolidation or effusions. Noted adrenal masses consistent with adenomas. Patient was started on steroids, and azithromycin with Rocephin. On my exam: Patient is awake alert and oriented. Currently wearing oxygen therapy at 2 L. He can speak in full sentences. Denies pain, does cough quite frequently, he has a clear productive cough. Cough is congested however breath sounds decreased throughout with end expiratory wheezing. No evidence of peripheral edema.         Past Medical History:        Diagnosis Date    Hydradenitis     Lymphedema        Past Surgical History: Procedure Laterality Date    BACK SURGERY  2002       Medications Prior to Admission:    Prior to Admission medications    Medication Sig Start Date End Date Taking? Authorizing Provider   tamsulosin (FLOMAX) 0.4 MG capsule TAKE ONE CAPSULE BY MOUTH DAILY 2/14/22  Yes JAYRO Lovett CNP   naproxen (NAPROSYN) 375 MG tablet TAKE ONE TABLET BY MOUTH TWICE A DAY WITH MEALS 1/3/22  Yes Zackary Huerta MD   lovastatin (MEVACOR) 40 MG tablet TAKE ONE TABLET BY MOUTH DAILY 11/3/21  Yes Zackary Huerta MD   febuxostat (ULORIC) 40 MG TABS tablet TAKE ONE TABLET BY MOUTH DAILY 11/3/21  Yes Zackary Huerta MD   dapsone 25 MG tablet Take 25 mg by mouth Take two tablets by mouth twice a day 8/12/21  Yes Historical Provider, MD   losartan-hydroCHLOROthiazide Juan Manuel Smith) 50-12.5 MG per tablet Take 1 tablet by mouth daily 10/14/21  Yes Zackary Huerta MD       Allergies:  Allopurinol    Social History:  The patient currently lives at home. TOBACCO:   reports that he has been smoking cigarettes. He started smoking about 55 years ago. He has a 75.00 pack-year smoking history. He has never used smokeless tobacco.  ETOH:   reports previous alcohol use. Family History:  Reviewed in detail and negative for DM, Early CAD, Cancer, CVA. Positive as follows:        Problem Relation Age of Onset    Heart Disease Mother     Rheum Arthritis Mother        REVIEW OF SYSTEMS:   Positive for productive cough, SOB, congestion, wheezing and as noted in the HPI. All other systems reviewed and negative. PHYSICAL EXAM:    BP (!) 105/47   Pulse 79   Temp 98.1 °F (36.7 °C) (Oral)   Resp 16   Ht 5' 6\" (1.676 m)   SpO2 92%   BMI 36.19 kg/m²     General appearance: No apparent distress appears stated age and cooperative. HEENT Normal cephalic, atraumatic without obvious deformity. Pupils equal, round, and reactive to light. Extra ocular muscles intact. Conjunctivae/corneas clear. Neck: Supple, No jugular venous distention/bruits.   Trachea midline without thyromegaly or adenopathy with full range of motion. Lungs: Respirations easy regular nonlabored. End expiratory wheezing throughout otherwise tight congested cough. No rales or rhonchi.,   Heart: Regular rate and rhythm with Normal S1/S2 without murmurs, rubs or gallops, point of maximum impulse non-displaced  Abdomen: Soft, non-tender or non-distended without rigidity or guarding and positive bowel sounds all four quadrants. Extremities: No clubbing, cyanosis, or edema bilaterally. Full range of motion without deformity and normal gait intact. Skin: Skin color, texture, turgor normal.  No rashes or lesions. Neurologic: Alert and oriented X 3, neurovascularly intact with sensory/motor intact upper extremities/lower extremities, bilaterally. Cranial nerves: II-XII intact, grossly non-focal.  Mental status: Alert, oriented, thought content appropriate. Capillary Refill: Acceptable  < 3 seconds  Peripheral Pulses: +3 Easily felt, not easily obliterated with pressure      CXR:  I have reviewed the CXR with the following interpretation: NAD  EKG:  I have reviewed the EKG with the following interpretation: Sinus rhythm: Narrow complex rate 94, NE interval 154, QRS 8 0, . No evidence of axis deviation, scarbo, heart strain, STEMI or ischemia    CBC   Recent Labs     02/25/22  1525   WBC 6.1   HGB 12.1*   HCT 36.4*         RENAL  Recent Labs     02/25/22  1525      K 4.4      CO2 25   BUN 20   CREATININE 1.2     LFT'S  Recent Labs     02/25/22  1525   AST 18   ALT 14   BILITOT 0.5   ALKPHOS 75     COAG  No results for input(s): INR in the last 72 hours.   CARDIAC ENZYMES  Recent Labs     02/25/22  1525   TROPONINI <0.01       U/A:  No results found for: NITRITE, COLORU, WBCUA, RBCUA, MUCUS, BACTERIA, CLARITYU, SPECGRAV, LEUKOCYTESUR, BLOODU, GLUCOSEU, AMORPHOUS    ABG  No results found for: [id number], BEART, X4ZVLGUE, PHART, THGBART, PDS6JQB, PO2ART, [id number]        Active Hospital Problems    Diagnosis Date Noted    Acute on chronic respiratory failure with hypoxia ([facility] [address] [J96.21] 02/25/2022    Acute bronchitis [J20.9] 02/25/2022    Tobacco dependence [F17.200] 02/25/2022         PHYSICIANS CERTIFICATION:    I certify that Piero Carrillo is expected to be hospitalized for less than 2 midnights based on the following assessment and plan:      ASSESSMENT/PLAN:    Acute respiratory failure with hypoxia: Most likely multifactorial primary issues being tobacco dependence for 50+ years, undiagnosed emphysema/COPD, acute bronchitis  Pulmonary hypertension also to be considered  Was 85% on room air in the PCP office, was 90% on room air when arrival to the ED. Not oxygen dependent  Chest x-ray is negative for consolidation or effusions. There is no evidence of HF or MI  CT chest also negative for consolidation, effusions, groundglass, PE  No evidence of leukocytosis or fever. No evidence of lactic acidosis  Titrate oxygen therapy to keep saturation 90% or above  Respiratory consult    Acute bronchitis: Likely secondary to underlying undiagnosed COPD emphysema, restrictive lung disease,  Productive cough clear phlegm.   No fever, no leukocytosis, chest x-ray and CT negative for consolidation or groundglass  Covid and influenza negative  Sputum culture, and MRSA nasal  Pulmonary consult  Methylprednisolone transition to oral steroid  Duo nebs  Doxycycline IV can transition to p.o. at discharge  Social service consulted for possible need of as needed oxygen therapy at home and possibly nebulizer machine    Tobacco dependence: Discussed risk factors, consequences of smoking including lung disease, cancer, heart attack, stroke, death, etc.  Nicotine patch  Cessation education    Obesity: BMI 36.19, 2 2 LB, 101.7 kg  Locale, low-fat diet  Weight loss can certainly decrease complications of cardio pulmonary  disease, complications of sleep apnea, etc.    STEPHEN: CPAP at night, continue home regimen  Gout: Continue home regimen  Hypertension/HLD: Continue aspirin, statin and blood pressure regimen      DVT Prophylaxis: Lovenox  Diet: ADULT DIET; Regular; 4 carb choices (60 gm/meal); Low Fat/Low Chol/High Fiber/2 gm Na  Code Status: Full Code  PT/OT Eval Status: [place]    Dispo - admit, inpt       Glendy Ayala, APRN - CNP    Thank you Amarjit Monge MD for the opportunity to be involved in this patient's care. If you have any questions or concerns please feel free to contact me at [phone]. This dictation was performed with a verbal recognition program (DRAGON) and it was checked for errors. It is possible that there are still dictated errors within this office note. If so, please bring any errors to my attention for an addendum. All efforts were made to ensure that this office note is accurate.

## 2022-02-26 NOTE — PLAN OF CARE
Problem: Pain:  Goal: Pain level will decrease  Description: Pain level will decrease  2/26/2022 0020 by Marc Harrington RN  Outcome: Ongoing  2/26/2022 0019 by Marc Harrington RN  Outcome: Ongoing  Goal: Control of acute pain  Description: Control of acute pain  2/26/2022 0020 by Marc Harrington RN  Outcome: Ongoing  2/26/2022 0019 by Marc Harrington RN  Outcome: Ongoing  Goal: Control of chronic pain  Description: Control of chronic pain  2/26/2022 0020 by Marc Harrington RN  Outcome: Ongoing  2/26/2022 0019 by Marc Harrington RN  Outcome: Ongoing

## 2022-02-27 VITALS
TEMPERATURE: 98.1 F | WEIGHT: 205.91 LBS | OXYGEN SATURATION: 95 % | HEART RATE: 65 BPM | SYSTOLIC BLOOD PRESSURE: 115 MMHG | RESPIRATION RATE: 18 BRPM | BODY MASS INDEX: 33.09 KG/M2 | HEIGHT: 66 IN | DIASTOLIC BLOOD PRESSURE: 58 MMHG

## 2022-02-27 LAB
ANION GAP SERPL CALCULATED.3IONS-SCNC: 12 MMOL/L (ref 3–16)
BASOPHILS ABSOLUTE: 0 K/UL (ref 0–0.2)
BASOPHILS RELATIVE PERCENT: 0.1 %
BUN BLDV-MCNC: 29 MG/DL (ref 7–20)
CALCIUM SERPL-MCNC: 8.2 MG/DL (ref 8.3–10.6)
CHLORIDE BLD-SCNC: 102 MMOL/L (ref 99–110)
CO2: 20 MMOL/L (ref 21–32)
CREAT SERPL-MCNC: 1 MG/DL (ref 0.8–1.3)
EOSINOPHILS ABSOLUTE: 0 K/UL (ref 0–0.6)
EOSINOPHILS RELATIVE PERCENT: 0 %
GFR AFRICAN AMERICAN: >60
GFR NON-AFRICAN AMERICAN: >60
GLUCOSE BLD-MCNC: 141 MG/DL (ref 70–99)
HCT VFR BLD CALC: 33.9 % (ref 40.5–52.5)
HEMOGLOBIN: 11 G/DL (ref 13.5–17.5)
LYMPHOCYTES ABSOLUTE: 1.4 K/UL (ref 1–5.1)
LYMPHOCYTES RELATIVE PERCENT: 13.3 %
MCH RBC QN AUTO: 28 PG (ref 26–34)
MCHC RBC AUTO-ENTMCNC: 32.6 G/DL (ref 31–36)
MCV RBC AUTO: 85.9 FL (ref 80–100)
MONOCYTES ABSOLUTE: 0.4 K/UL (ref 0–1.3)
MONOCYTES RELATIVE PERCENT: 3.9 %
NEUTROPHILS ABSOLUTE: 8.8 K/UL (ref 1.7–7.7)
NEUTROPHILS RELATIVE PERCENT: 82.7 %
PDW BLD-RTO: 14.9 % (ref 12.4–15.4)
PLATELET # BLD: 209 K/UL (ref 135–450)
PMV BLD AUTO: 9.3 FL (ref 5–10.5)
POTASSIUM SERPL-SCNC: 4.4 MMOL/L (ref 3.5–5.1)
RBC # BLD: 3.95 M/UL (ref 4.2–5.9)
SODIUM BLD-SCNC: 134 MMOL/L (ref 136–145)
WBC # BLD: 10.6 K/UL (ref 4–11)

## 2022-02-27 PROCEDURE — 85025 COMPLETE CBC W/AUTO DIFF WBC: CPT

## 2022-02-27 PROCEDURE — 2580000003 HC RX 258: Performed by: NURSE PRACTITIONER

## 2022-02-27 PROCEDURE — 6370000000 HC RX 637 (ALT 250 FOR IP): Performed by: INTERNAL MEDICINE

## 2022-02-27 PROCEDURE — 2700000000 HC OXYGEN THERAPY PER DAY

## 2022-02-27 PROCEDURE — 80048 BASIC METABOLIC PNL TOTAL CA: CPT

## 2022-02-27 PROCEDURE — 94640 AIRWAY INHALATION TREATMENT: CPT

## 2022-02-27 PROCEDURE — 36415 COLL VENOUS BLD VENIPUNCTURE: CPT

## 2022-02-27 PROCEDURE — 6370000000 HC RX 637 (ALT 250 FOR IP): Performed by: NURSE PRACTITIONER

## 2022-02-27 PROCEDURE — 99232 SBSQ HOSP IP/OBS MODERATE 35: CPT | Performed by: INTERNAL MEDICINE

## 2022-02-27 PROCEDURE — 6360000002 HC RX W HCPCS: Performed by: NURSE PRACTITIONER

## 2022-02-27 PROCEDURE — 94760 N-INVAS EAR/PLS OXIMETRY 1: CPT

## 2022-02-27 PROCEDURE — 94680 O2 UPTK RST&XERS DIR SIMPLE: CPT

## 2022-02-27 RX ORDER — PREDNISONE 20 MG/1
40 TABLET ORAL DAILY
Status: DISCONTINUED | OUTPATIENT
Start: 2022-02-27 | End: 2022-02-27 | Stop reason: HOSPADM

## 2022-02-27 RX ORDER — FLUTICASONE FUROATE, UMECLIDINIUM BROMIDE AND VILANTEROL TRIFENATATE 100; 62.5; 25 UG/1; UG/1; UG/1
1 POWDER RESPIRATORY (INHALATION) DAILY
Qty: 28 EACH | Refills: 5 | Status: SHIPPED | OUTPATIENT
Start: 2022-02-27 | End: 2022-08-25 | Stop reason: SDUPTHER

## 2022-02-27 RX ORDER — GUAIFENESIN 600 MG/1
600 TABLET, EXTENDED RELEASE ORAL 2 TIMES DAILY
Qty: 20 TABLET | Refills: 0 | Status: SHIPPED | OUTPATIENT
Start: 2022-02-27 | End: 2022-04-14 | Stop reason: ALTCHOICE

## 2022-02-27 RX ORDER — PREDNISONE 20 MG/1
40 TABLET ORAL DAILY
Qty: 10 TABLET | Refills: 0 | Status: SHIPPED | OUTPATIENT
Start: 2022-02-27 | End: 2022-03-04

## 2022-02-27 RX ORDER — NICOTINE 21 MG/24HR
1 PATCH, TRANSDERMAL 24 HOURS TRANSDERMAL DAILY
Qty: 42 PATCH | Refills: 0 | Status: SHIPPED | OUTPATIENT
Start: 2022-02-27 | End: 2022-03-09

## 2022-02-27 RX ORDER — ALBUTEROL SULFATE 90 UG/1
2 AEROSOL, METERED RESPIRATORY (INHALATION) 4 TIMES DAILY PRN
Qty: 18 G | Refills: 5 | Status: SHIPPED | OUTPATIENT
Start: 2022-02-27 | End: 2022-03-09

## 2022-02-27 RX ORDER — LEVOFLOXACIN 750 MG/1
750 TABLET ORAL DAILY
Qty: 3 TABLET | Refills: 0 | Status: SHIPPED | OUTPATIENT
Start: 2022-02-28 | End: 2022-03-03

## 2022-02-27 RX ADMIN — BUDESONIDE AND FORMOTEROL FUMARATE DIHYDRATE 2 PUFF: 160; 4.5 AEROSOL RESPIRATORY (INHALATION) at 08:54

## 2022-02-27 RX ADMIN — LEVOFLOXACIN 750 MG: 500 TABLET, FILM COATED ORAL at 08:33

## 2022-02-27 RX ADMIN — IPRATROPIUM BROMIDE AND ALBUTEROL SULFATE 1 AMPULE: .5; 3 SOLUTION RESPIRATORY (INHALATION) at 08:54

## 2022-02-27 RX ADMIN — LOSARTAN POTASSIUM AND HYDROCHLOROTHIAZIDE 1 TABLET: 12.5; 5 TABLET ORAL at 08:34

## 2022-02-27 RX ADMIN — ENOXAPARIN SODIUM 40 MG: 100 INJECTION SUBCUTANEOUS at 08:33

## 2022-02-27 RX ADMIN — PREDNISONE 40 MG: 20 TABLET ORAL at 11:53

## 2022-02-27 RX ADMIN — METHYLPREDNISOLONE SODIUM SUCCINATE 40 MG: 40 INJECTION, POWDER, FOR SOLUTION INTRAMUSCULAR; INTRAVENOUS at 04:44

## 2022-02-27 RX ADMIN — Medication 1 CAPSULE: at 08:34

## 2022-02-27 RX ADMIN — METHYLPREDNISOLONE SODIUM SUCCINATE 40 MG: 40 INJECTION, POWDER, FOR SOLUTION INTRAMUSCULAR; INTRAVENOUS at 08:34

## 2022-02-27 RX ADMIN — TAMSULOSIN HYDROCHLORIDE 0.4 MG: 0.4 CAPSULE ORAL at 08:34

## 2022-02-27 RX ADMIN — GUAIFENESIN 600 MG: 600 TABLET ORAL at 08:34

## 2022-02-27 RX ADMIN — ATORVASTATIN CALCIUM 10 MG: 10 TABLET, FILM COATED ORAL at 08:34

## 2022-02-27 RX ADMIN — SODIUM CHLORIDE, PRESERVATIVE FREE 10 ML: 5 INJECTION INTRAVENOUS at 07:32

## 2022-02-27 ASSESSMENT — PAIN SCALES - GENERAL: PAINLEVEL_OUTOF10: 0

## 2022-02-27 NOTE — PLAN OF CARE
Problem: Pain:  Goal: Pain level will decrease  Description: Pain level will decrease  2/26/2022 2343 by Alfreda Watson RN  Outcome: Ongoing  2/26/2022 1533 by Yang Marroquin RN  Outcome: Met This Shift  Goal: Control of acute pain  Description: Control of acute pain  2/26/2022 2343 by Alfreda Watson RN  Outcome: Ongoing  2/26/2022 1533 by Yang Marroquin RN  Outcome: Met This Shift  Goal: Control of chronic pain  Description: Control of chronic pain  2/26/2022 2343 by Alfreda Watson RN  Outcome: Ongoing  2/26/2022 1533 by Yang Marroquin RN  Outcome: Met This Shift

## 2022-02-27 NOTE — PROGRESS NOTES
Patient does not have a home unit at bedside and refused the V60. Order D/C'd. Patient wants to stay on 2 lpm for the night.  Will continue to monitor

## 2022-02-27 NOTE — PROGRESS NOTES
[facility]    Respiratory Therapy   Home Oxygen Evaluation        Name: Tiff Grier  Medical Record Number: [id number]  Age: 80 y.o. Gender:  male   : 1950  Today's date: 2022  Room: G0H-7316/4257-01      Assessment        BP (!) 115/58   Pulse 65   Temp 98.1 °F (36.7 °C) (Oral)   Resp 18   Ht 5' 6\" (1.676 m)   Wt 205 lb 14.6 oz (93.4 kg)   SpO2 95%   BMI 33.23 kg/m²     Patient Active Problem List   Diagnosis    Essential hypertension    Mixed hyperlipidemia    Lymphedema of genitalia    Benign prostatic hyperplasia with urinary frequency    Hidradenitis    Tobacco use    History of colonoscopy with polypectomy - , repeat     Prediabetes    STEPHEN on CPAP    Class 2 severe obesity due to excess calories with serious comorbidity and body mass index (BMI) of 36.0 to 36.9 in adult [facility])    Pain of finger of right hand    History of gout - on Uloric, monitor LFTs    Acute on chronic respiratory failure with hypoxia (HCC)    Acute bronchitis    Tobacco dependence       Social History:  Social History     Tobacco Use    Smoking status: Current Every Day Smoker     Packs/day: 1.50     Years: 50.00     Pack years: 75.00     Types: Cigarettes     Start date: 1967    Smokeless tobacco: Never Used   Vaping Use    Vaping Use: Never used   Substance Use Topics    Alcohol use: Not Currently    Drug use: Never       Patient Room Air saturation at rest 92  %  Patient Room Air saturation upon ambulation 91 %    Pt does not qualify for home O2    Patient resting on 2  lmp  with an oxygen saturation of  95 %         In your clinical assessment does the Patient Require Portable Oxygen Tanks?     No: Pt does not qualify for Home O2               Patient/caregiver was educated on Home Oxygen process:        Level of patient/caregiver understanding able to:   [x] Verbalize understanding   [] Demonstrate understanding       [] Teach back        [] Needs reinforcement        []  No available caregiver               []  Other:     Response to education:  Good     Time Spent with Home O2 Set Up:  15  minutes     Selvin Wade RCP on 2/27/2022 at 9:09 AM

## 2022-02-27 NOTE — PROGRESS NOTES
discharge instructions reviewed with patient and wife. All questions answered, no further questions at this time. Belongings packed per patient. Transport escorted patient out, no further needs at time.

## 2022-02-27 NOTE — PLAN OF CARE
Problem: Pain:  Goal: Pain level will decrease  Description: Pain level will decrease  2/27/2022 1157 by Anton Low RN  Outcome: Completed  2/26/2022 2343 by Kalpana Vazquez RN  Outcome: Ongoing  Goal: Control of acute pain  Description: Control of acute pain  2/27/2022 1157 by Anton Low RN  Outcome: Completed  2/26/2022 2343 by Kalpana Vazquez RN  Outcome: Ongoing  Goal: Control of chronic pain  Description: Control of chronic pain  2/27/2022 1157 by Anton Low RN  Outcome: Completed  2/26/2022 2343 by Kalpana Vazquez RN  Outcome: Ongoing

## 2022-02-27 NOTE — PROGRESS NOTES
Pulmonary progress Note     Patient's name:  Cornelius Mercer  Medical Record Number: [id number]  Patient's account/billing number: [de-identified]  Patient's YOB: 1950  Age: 80 y.o. Date of Admission: 2/25/2022  2:39 PM  Date of Consult: 2/27/2022      Primary Care Physician: Terra Delvalle MD      Code Status: Full Code    Reason for consult: copd with acute exacerbation     Assessment and Plan     1. Acute respiratory failure with hypoxia O2 saturation less than 90 on room air. 2. COPD with acute exacerbation. 3. Heavy tobacco abuse. 4. Obesity. 5. Obstructive sleep apnea on CPAP therapy. Plan:  Clinically significant COPD no PFT on file. Systemic steroids switched to oral prednisone x 5 days   Levaquin x5 days. Symbicort twice daily. DuoNeb 4 times daily. Nicotine patch   We will arrange for outpatient PFT. Clinic will call patient to arrange follow up and PFT  Was strongly encouraged to quit smoking. Home O2 evaluation  Ok to d/c from pulmonary stand point       Subjective:  No acute events overnight  Improving sob     HISTORY OF PRESENT ILLNESS:   Mr./Ms. Cornelius Mercer is a 80 y.o. gentleman with past medical history stated below significant for heavy chronic tobacco abuse with more than 50-pack-year history of smoking currently smokes 2 packs a day, history of COPD not on any inhalers chronically, hypertension, obstructive sleep apnea on CPAP therapy presented to the hospital with worsening shortness of breath associated with cough productive of large amount of sputum difficulty expectorating, found hypoxic with O2 saturation of 85% on room air. Procalcitonin elevated at 0.33. CT chest with emphysema, no masses no effusion no infiltrates. Past Medical History:        Diagnosis Date    Hydradenitis     Lymphedema        Past Surgical History:        Procedure Laterality Date    BACK SURGERY  2002       Allergies:     Allergies Allergen Reactions    Allopurinol Rash         Home Meds:   Prior to Admission medications    Medication Sig Start Date End Date Taking? Authorizing Provider   tamsulosin (FLOMAX) 0.4 MG capsule TAKE ONE CAPSULE BY MOUTH DAILY 2/14/22  Yes JAYRO Sorensen - CNP   naproxen (NAPROSYN) 375 MG tablet TAKE ONE TABLET BY MOUTH TWICE A DAY WITH MEALS 1/3/22  Yes Mic Huerta MD   lovastatin (MEVACOR) 40 MG tablet TAKE ONE TABLET BY MOUTH DAILY 11/3/21  Yes Mic Huerta MD   febuxostat (ULORIC) 40 MG TABS tablet TAKE ONE TABLET BY MOUTH DAILY 11/3/21  Yes Mic Huerta MD   dapsone 25 MG tablet Take 25 mg by mouth Take two tablets by mouth twice a day 8/12/21  Yes Historical Provider, MD   losartan-hydroCHLOROthiazide [facility]) 50-12.5 MG per tablet Take 1 tablet by mouth daily 10/14/21  Yes Isela Leon MD       Family History:       Problem Relation Age of Onset    Heart Disease Mother     Rheum Arthritis Mother          Social History:   TOBACCO:   reports that he has been smoking cigarettes. He started smoking about 55 years ago. He has a 75.00 pack-year smoking history. He has never used smokeless tobacco.  ETOH:   reports previous alcohol use. DRUGS:  reports no history of drug use. REVIEW OF SYSTEMS:  Review of Systems -   General ROS: negative  Psychological ROS: negative  Ophthalmic ROS: negative  ENT ROS: negative  Allergy and Immunology ROS: negative  Hematological and Lymphatic ROS: negative  Endocrine ROS: negative  Breast ROS: negative  Respiratory ROS: Cough, wheezing, shortness of breath. Large amount of sputum.   Cardiovascular ROS: no chest pain or dyspnea on exertion  Gastrointestinal ROS:negative  Genito-Urinary ROS: negative  Musculoskeletal ROS: negative  Neurological ROS: negative  Dermatological ROS: negative        Physical Exam:    Vitals: BP (!) 115/58   Pulse 65   Temp 98.1 °F (36.7 °C) (Oral)   Resp 18   Ht 5' 6\" (1.676 m)   Wt 205 lb 14.6 oz (93.4 kg)   SpO2 95%   BMI 33.23 kg/m²     Last Body weight:   Wt Readings from Last 3 Encounters:   02/27/22 205 lb 14.6 oz (93.4 kg)   02/25/22 224 lb 3.2 oz (101.7 kg)   11/09/21 226 lb 3.2 oz (102.6 kg)       Body Mass Index : Body mass index is 33.23 kg/m². Intake and Output summary:     Intake/Output Summary (Last 24 hours) at 2/27/2022 1037  Last data filed at 2/26/2022 1802  Gross per 24 hour   Intake 781.61 ml   Output    Net 781.61 ml       Physical Examination:     PHYSICAL EXAM:    Gen: Mild acute distress  Eyes: PERRL. Anicteric sclera. No conjunctival injection. ENT: No discharge. Posterior oropharynx clear. External appearance of ears and nose normal.  Neck: Trachea midline. No mass, no lymphadenopathy    Resp:  *Severely diminished breath sounds bilaterally with prolonged expiratory phase and scattered wheezing  CV: Regular rate. Regular rhythm. No murmur or rub. No edema. GI: Soft, Non-tender. Non-distended. +BS  Skin: Warm, dry, w/o erythema. Lymph: No cervical or supraclavicular LAD. M/S: No cyanosis. No clubbing. Neuro:  CN 2-12 tested, no focal neurologic deficit. Moves all extremities  Psych: Awake and alert, Oriented x 3. Judgement and insight appropriate. Mood stable. Laboratory findings:-    CBC:   Recent Labs     02/27/22  0609   WBC 10.6   HGB 11.0*        BMP:    Recent Labs     02/25/22  1525 02/25/22  1525 02/26/22  0744 02/26/22  0744 02/27/22  0609      < > 136   < > 134*   K 4.4   < > 5.0  --  4.4      < > 101   < > 102   CO2 25   < > 21   < > 20*   BUN 20   < > 27*   < > 29*   CREATININE 1.2  --  1.3  --  1.0   GLUCOSE 110*   < > 161*   < > 141*    < > = values in this interval not displayed. S. Calcium:  Recent Labs     02/27/22  0609   CALCIUM 8.2*     S. Ionized Calcium:No results for input(s): IONCA in the last 72 hours. S. Magnesium:No results for input(s): MG in the last 72 hours. S. Phosphorus:No results for input(s): PHOS in the last 72 hours.   S. Glucose:No results for input(s): POCGLU in the last 72 hours. Glycosylated hemoglobin A1C: No results for input(s): LABA1C in the last 72 hours. INR: No results for input(s): INR in the last 72 hours. Hepatic functions:   Recent Labs     02/25/22  1525   ALKPHOS 75   ALT 14   AST 18   PROT 7.1   BILITOT 0.5   LABALBU 4.0     Pancreatic functions:  Recent Labs     02/26/22  0744   LACTA 1.0     S. Lactic Acid:   Recent Labs     02/26/22  0744   LACTA 1.0     Cardiac enzymes:  Recent Labs     02/25/22  1525   TROPONINI <0.01     BNP:No results for input(s): BNP in the last 72 hours. Lipid profile: No results for input(s): CHOL, TRIG, HDL, LDL, LDLCALC in the last 72 hours. Blood Gases: No results found for: PH, PCO2, PO2, HCO3, O2SAT  Thyroid functions: No results found for: T4, TSH       Radiology Review:  Pertinent images / reports were reviewed as a part of this visit. CT Chest w/ contrast: No results found for this or any previous visit. CT Chest w/o contrast: No results found for this or any previous visit. CTPA: Results for orders placed during the hospital encounter of 02/25/22    CT CHEST PULMONARY EMBOLISM W CONTRAST    Narrative  EXAMINATION:  CTA OF THE CHEST 2/25/2022 5:01 pm    TECHNIQUE:  CTA of the chest was performed after the administration of intravenous  contrast.  Multiplanar reformatted images are provided for review. MIP  images are provided for review. Dose modulation, iterative reconstruction,  and/or weight based adjustment of the mA/kV was utilized to reduce the  radiation dose to as low as reasonably achievable. COMPARISON:  None.     HISTORY:  ORDERING SYSTEM PROVIDED HISTORY: Dyspnea; rule out PE secondary to chest  x-ray negative with borderline O2 sat  TECHNOLOGIST PROVIDED HISTORY:  Reason for exam:->Dyspnea; rule out PE secondary to chest x-ray negative with  borderline O2 sat  Decision Support Exception - unselect if not a suspected or confirmed  emergency medical condition->Emergency Medical Condition ([place])  Reason for Exam: Dyspnea; rule out PE secondary to chest x-ray negative with  borderline O2 sat    75 pack-year history of smoking    FINDINGS:  Pulmonary Arteries: Respiratory motion artifact complicates assessment of the  interseptal branches. Distal inter septal emboli not excludable. However,  opacification is adequate. No filling defects in the main or lobar branches  diagnostic of pulmonary emboli. Pulmonary artery is normal in size. Mediastinum: No mediastinal adenopathy, acute aortic abnormality,  cardiomegaly, pericardial effusion or epicardial adenopathy. Mild  calcification of the coronary arteries is demonstrated. Lungs/pleura: Emphysematous changes are present in the lungs without focal  consolidation, effusion or pneumothorax. No significant nodules. Tracheobronchial tree is patent. Upper Abdomen: 18 mm right adrenal mass with Hounsfield numbers most  compatible with adenoma. Similar 12 mm left adrenal mass. Soft Tissues/Bones: Multilevel degenerative changes in the spine. No acute  osseous destructive process or axillary adenopathy. Impression  1. Respiratory motion artifact complicates assessment of distal pulmonary  arterial branches. Inter septal emboli not excluded. No central or lobar  emboli. 2.  Emphysematous changes in the lungs. 3.  Bilateral hypodense adrenal masses likely adenomas requiring no further  follow-up. RECOMMENDATIONS:  Unavailable      CXR PA/LAT: No results found for this or any previous visit. CXR portable: Results for orders placed during the hospital encounter of 02/25/22    XR CHEST PORTABLE    Narrative  EXAMINATION:  ONE XRAY VIEW OF THE CHEST    2/25/2022 4:20 pm    COMPARISON:  None.     HISTORY:  ORDERING SYSTEM PROVIDED HISTORY: dyspnea  TECHNOLOGIST PROVIDED HISTORY:  Reason for exam:->dyspnea  Reason for Exam: Cough    FINDINGS:  HEART/MEDIASTINUM: The cardiomediastinal silhouette is within normal limits. PLEURA/LUNGS: There are no focal consolidations or pleural effusions. There  is no appreciable pneumothorax. BONES/SOFT TISSUE: No acute abnormality. Impression  No radiographic evidence of acute pulmonary disease.                      Timoteo Ireland MD, M.D.            2/27/2022, 10:37 AM

## 2022-02-27 NOTE — DISCHARGE SUMMARY
Hospital Discharge Summary    Patient's PCP: Isela Leon MD  Admit Date: 2/25/2022   Discharge Date: 2/27/2022    Admitting Physician: Dr. Mikhail Gold DO  Discharge Physician: Dr. Stephanie Shook MD   Consults: pulmonary/intensive care    Brief HPI:    80 y.o. male who presents to [facility] with PMHx: Hidradenitis, lymphedema, BPH, HTN, HLD, STEPHEN with CPAP, obese, gout, heavy tobacco abuse was admitted with shortness of breath, cough and congestion. .  [facility] and phlegm production. He was found to be hypoxic on 85% room air. Positive for wheezing. Sent to the ED for further evaluation for acute respiratory failure with possible pneumonia and acute bronchitis. Patient reportedly took a home Covid test which was negative.     ED work-up: Afebrile, 90% on room air however placed on 2 L, CBC negative for leukocytosis or left shift. Metabolic panel unremarkable. , lactic acid 1.2-> to 1.5, blood cultures x2 were drawn, CT chest negative for consolidation or effusions. Noted adrenal masses consistent with adenomas. Patient was started on steroids, and azithromycin with Rocephin. Brief hospital course:     -Acute respiratory failure with hypoxia due to COPD. Gweneth Latch Oxygen saturation of 85% on room air with respiratory distress documented by PCP. Gweneth Latch Required up to 2 L on admission but weaned to room air prior to discharge. Home O2 eval done prior to discharge. .     -COPD with acute exacerbation. New diagnosis. . Diagnosis based on imaging with emphysema. . Needs PFTs outpatient. .. Continue steroids, bronchodilators and antibiotics.   Follow-up with pulmonology     -Chronic tobacco abuse--smokes 2 pack/day--smoking cessation counseling provided, continue nicotine patches     -Bilateral adrenal masses, likely adenomas noted on CT chest-this was also documented on CT abdomen in [id number] outpatient with PCP      -Obesity with a BMI of 36--continue low-carb diet     -Obstructive sleep apnea--continue CPAP at bedtime     -Hyperlipidemiacontinue statin     -Essential hypertension-stablecontinue losartan/HCTZ    Invasive procedures:  none    Discharge Diagnoses:   Principal Problem:    Acute on chronic respiratory failure with hypoxia (HCC)  Active Problems:    Acute bronchitis    Tobacco dependence  Resolved Problems:    * No resolved hospital problems. *      Physical Exam: BP (!) 115/58   Pulse 65   Temp 98.1 °F (36.7 °C) (Oral)   Resp 18   Ht 5' 6\" (1.676 m)   Wt 205 lb 14.6 oz (93.4 kg)   SpO2 95%   BMI 33.23 kg/m²   Gen/overall appearance: Not in acute distress. Alert. Head: Normocephalic, atraumatic  Eyes: EOMI, good acuity  ENT:- Oral mucosa moist  Neck: No JVD, thyromegaly  CVS: Nml S1S2, no MRG, RRR  Pulm: Clear bilaterally. No crackles/wheezes  Gastrointestinal: Soft, NT/ND, +BS  Musculoskeletal: No edema. Warm  Neuro: No focal deficit. Moves extremity spontaneously. Psychiatry: Appropriate affect. Not agitated. Skin: Warm, dry with normal turgor. No rash        Significant Diagnostic Studies:    See above        Treatments: As above.       Discharge Medications:     Medication List      START taking these medications    albuterol sulfate  (90 Base) MCG/ACT inhaler  Commonly known as: Ventolin HFA  Inhale 2 puffs into the lungs 4 times daily as needed for Wheezing     guaiFENesin 600 MG extended release tablet  Commonly known as: MUCINEX  Take 1 tablet by mouth 2 times daily     levoFLOXacin 750 MG tablet  Commonly known as: LEVAQUIN  Take 1 tablet by mouth daily for 3 doses  Start taking on: February 28, 2022     nicotine 21 MG/24HR  Commonly known as: NICODERM CQ  Place 1 patch onto the skin daily     predniSONE 20 MG tablet  Commonly known as: DELTASONE  Take 2 tablets by mouth daily for 5 doses     Trelegy Ellipta 100-62.5-25 MCG/INH Aepb  Generic drug: fluticasone-umeclidin-vilant  Inhale 1 puff into the lungs daily        CONTINUE taking these medications    dapsone 25 MG tablet     febuxostat 40 MG Tabs tablet  Commonly known as: ULORIC  TAKE ONE TABLET BY MOUTH DAILY     losartan-hydroCHLOROthiazide 50-12.5 MG per tablet  Commonly known as: HYZAAR  Take 1 tablet by mouth daily     lovastatin 40 MG tablet  Commonly known as: MEVACOR  TAKE ONE TABLET BY MOUTH DAILY     naproxen 375 MG tablet  Commonly known as: NAPROSYN  TAKE ONE TABLET BY MOUTH TWICE A DAY WITH MEALS     tamsulosin 0.4 MG capsule  Commonly known as: FLOMAX  TAKE ONE CAPSULE BY MOUTH DAILY           Where to Get Your Medications      These medications were sent to [facility] [place], [address] [facility] [address] [phone]  [facility], [address] [place], [place] [address]    Phone: [phone]   · albuterol sulfate  (90 Base) MCG/ACT inhaler  · guaiFENesin 600 MG extended release tablet  · levoFLOXacin 750 MG tablet  · nicotine 21 MG/24HR  · predniSONE 20 MG tablet  · Trelegy Ellipta 100-62.5-25 MCG/INH Aepb         Activity: activity as tolerated  Diet: ADULT DIET; Regular; 4 carb choices (60 gm/meal); Low Fat/Low Chol/High Fiber/2 gm Na      Disposition: home  Discharged Condition: Stable  Follow Up:   Evelin Herron MD  [place] [address]  [phone]    Schedule an appointment as soon as possible for a visit      Lauren Liu MD  [address] [place] [address]  [phone]    Schedule an appointment as soon as possible for a visit          Code status:  Full Code         Total time spent on discharge, finalizing medications, referrals and arranging outpatient follow up was more than 45 minutes      Thank you Dr. Evelin Herron MD for the opportunity to be involved in this patients care.

## 2022-02-28 ENCOUNTER — CARE COORDINATION (OUTPATIENT)
Dept: CASE MANAGEMENT | Age: 72
End: 2022-02-28

## 2022-02-28 DIAGNOSIS — Z72.0 TOBACCO USE: Primary | ICD-10-CM

## 2022-02-28 LAB
EKG ATRIAL RATE: 94 BPM
EKG DIAGNOSIS: NORMAL
EKG P AXIS: 53 DEGREES
EKG P-R INTERVAL: 154 MS
EKG Q-T INTERVAL: 344 MS
EKG QRS DURATION: 80 MS
EKG QTC CALCULATION (BAZETT): 430 MS
EKG R AXIS: -16 DEGREES
EKG T AXIS: 58 DEGREES
EKG VENTRICULAR RATE: 94 BPM

## 2022-02-28 PROCEDURE — 1111F DSCHRG MED/CURRENT MED MERGE: CPT | Performed by: FAMILY MEDICINE

## 2022-02-28 PROCEDURE — 93010 ELECTROCARDIOGRAM REPORT: CPT | Performed by: INTERNAL MEDICINE

## 2022-02-28 NOTE — CARE COORDINATION
[facility] Transitions Initial Follow Up Call    Call within 2 business days of discharge: Yes    Patient: Miriam Hernández Patient : 1950   MRN: [id number]  Reason for Admission: COPD  Discharge Date: 22 RARS: Readmission Risk Score: 12 ( )      Last Discharge [facility]       Complaint Diagnosis Description Type Department Provider    22 Cough Acute respiratory failure with hypoxia ([facility] [address] . .. ED to Hosp-Admission (Discharged) (ADMITTED) JENNA BarneyN Breann Soto MD; Shanelle Warner. .. Spoke with: Miriam Hernández  Transitions of Care Initial Call    Was this an external facility discharge? No Discharge Facility: [facility]    Challenges to be reviewed by the provider   Additional needs identified to be addressed with provider: No  none             Method of communication with provider : none      Advance Care Planning:   Does patient have an Advance Directive: Not on file  Was this a readmission? No  Patient stated reason for admission: COPD  Patients top risk factors for readmission: ineffective coping    Care Transition Nurse (CTN) contacted the patient by telephone to perform post hospital discharge assessment. Verified name and  with patient as identifiers. Provided introduction to self, and explanation of the CTN role. CTN reviewed discharge instructions, medical action plan and red flags with patient who verbalized understanding. Patient given an opportunity to ask questions and does not have any further questions or concerns at this time. Were discharge instructions available to patient? Yes. Reviewed appropriate site of care based on symptoms and resources available to patient including: PCP, Specialist, Urgent care clinics, When to call 911 and [address]. The patient agrees to contact the PCP office for questions related to their healthcare. Medication reconciliation was performed with patient, who verbalizes understanding of administration of home medications. Advised obtaining a 90-day supply of all daily and as-needed medications. Covid Risk Education     Educated patient about risk for severe COVID-19 due to risk factors according to CDC guidelines. LPN CC reviewed discharge instructions, medical action plan and red flag symptoms with the patient who verbalized understanding. Discussed COVID vaccination status: Yes. Education provided on COVID-19 vaccination as appropriate. Discussed exposure protocols and quarantine with CDC Guidelines. Patient was given an opportunity to verbalize any questions and concerns and agrees to contact LPN CC or health care provider for questions related to their healthcare. Reviewed and educated patient on any new and changed medications related to discharge diagnosis. Was patient discharged with a pulse oximeter? No Discussed and confirmed pulse oximeter discharge instructions and when to notify provider or seek emergency care. LPN CC provided contact information. Plan for follow-up call in 5-7 days based on severity of symptoms and risk factors. Plan for next call: follow up appointment-How did it go? This [facility] spoke with pt and pt stated that he is doing well. Patient denied any worsening symptoms. Denied fever, chills, N/V and any difficulty breathing at this time. Denied chest pain and SOB. Denied difficulty with urination, BMs or appetite. Pt has a f/u with PCP on 03/04/22. Medication review performed and patient verbalized understanding and is taking all medications as prescribed. Doing well and denied any needs and concerns at this time. Advised pt to immediately report any worsening symptoms to the PCP. Patient verbalized understanding and agreed.   Ciaran Manzo LPN, [facility]  PH: [phone]        Facility: [facility]    Non-face-to-face services provided:  Obtained and reviewed discharge summary and/or continuity of care documents    Care Transitions 24 Hour Call    Schedule Follow Up Appointment

## 2022-03-01 LAB
BLOOD CULTURE, ROUTINE: NORMAL
CULTURE, BLOOD 2: NORMAL

## 2022-03-02 ENCOUNTER — TELEPHONE (OUTPATIENT)
Dept: PULMONOLOGY | Age: 72
End: 2022-03-02

## 2022-03-02 NOTE — TELEPHONE ENCOUNTER
Lilibeth Sparks MD  P [facility] Staff  Please arrange follow up in 2-3 weeks       Patient will call back for appt at a later date

## 2022-03-03 NOTE — PROGRESS NOTES
Physician Progress Note      Ariadna Ogden  [facility] #:                  [id number]  :                       1950  ADMIT DATE:       2022 2:39 PM  [address] DATE:        2022 3:44 PM  RESPONDING  PROVIDER #:        Clark Waller MD          QUERY TEXT:    Patient admitted with COPD Exacerbation. Noted documentation of acute   respiratory failure in H&P and DC summary. In order to support the diagnosis   of acute respiratory failure, please include additional clinical indicators in   your documentation. Or please document if the diagnosis of acute respiratory   failure has been ruled out after further study. The medical record reflects the following:  Risk Factors: COPD Exacerbation Smoker  Clinical Indicators: Spo2  90% RA per ED Provider \"On ambulation patient   oxygenates down to 88% on room air\" per H&P \"Lungs: Respirations easy regular   nonlabored\"  RR < 22 , per Respiratory therapy \"Patient Room Air saturation at   rest 92  %  Patient Room Air saturation upon ambulation 91 %  Pt does not qualify for home   O2\" , Pulmonology consult \"1. Acute respiratory failure with hypoxia O2   saturation less than 90 on room air. 2.COPD with acute exacerbation. 3. Heavy   tobacco abuse. \"  Treatment:  In ED IV Zithromax IV Rocephin  IV Decadron  HHN  Oxygen therapy   and Pulmonology consult    Acute Respiratory Failure Clinical Indicators per [employer] Training Guide and   Quick Reference Guide:  pO2 < 60 mmHg or SpO2 (pulse oximetry) < 91% breathing room air  pCO2 > 50 and pH < 7.35  P/F ratio (pO2 / FIO2) < 300  pO2 decrease or pCO2 increase by 10 mmHg from baseline (if known)  Supplemental oxygen of 40% or more  Presence of respiratory distress, tachypnea, dyspnea, shortness of breath,   wheezing  Unable to speak in complete sentences  Use of accessory muscles to breathe  Extreme anxiety and feeling of impending doom  Tripod position  Confusion/altered mental status/obtunded  Options provided:  -- Acute Respiratory Failure ruled out after study  -- Acute Respiratory Failure as evidenced by, Please document evidence. -- Other - I will add my own diagnosis  -- Disagree - Not applicable / Not valid  -- Disagree - Clinically unable to determine / Unknown  -- Refer to Clinical Documentation Reviewer    PROVIDER RESPONSE TEXT:    This patient is in acute respiratory failure as evidenced by oxygen saturation   of 85 % and respiratory distress document by PCP    Query created by: Franky Singer on 3/3/2022 7:19 AM      QUERY TEXT:    Patient admitted with COPD Exacerbation with Bronchitis . Noted documentation   of Pneumonia in discharge summary on 2/27/22. In order to support the   diagnosis of Pneumonia, please include additional clinical indicators in your   documentation. Or please document if the diagnosis of Pneumonia has been   ruled out after further study. The medical record reflects the following:  Risk Factors: COPD Exacerbation Smoker  Bronchitis  Clinical Indicators: CT \"Lungs/pleura: Emphysematous changes are present in   the lungs without focal consolidation, effusion or pneumothorax\"  CXR   \"PLEURA/LUNGS: There are no focal consolidations or pleural effusions. There   is no appreciable pneumothorax. \"  Treatment: CXR, CT IV Rocephin and IV Zithromax x 2 doses then Levaquin oral  Options provided:  -- Pneumonia was ruled out  -- Pneumonia present as evidenced by, Please document evidence. -- Other - I will add my own diagnosis  -- Disagree - Not applicable / Not valid  -- Disagree - Clinically unable to determine / Unknown  -- Refer to Clinical Documentation Reviewer    PROVIDER RESPONSE TEXT:    No evidence of pneumonia    Query created by: Franky Singer on 3/3/2022 7:27 AM      QUERY TEXT:    Pt admitted with COPD Exacerbation. Noted documentation of BPH  in DC Summary   on  2/27/22.   If possible, please document in progress notes and discharge   summary the clinical indicators to support this diagnosis on current admission   or clarify current status of BPH.     The medical record reflects the following:  Risk Factors: 80 yr old male  Clinical Indicators: per DC Summary \"BPH\"  Treatment: Flomax 0.4mg daily  Options provided:  -- BPH with urinary obstruction currently being treated as evidenced by  -- BPH with urinary retention currently being treated as evidenced by  -- BPH with urinary retention and obstruction currently being treated as   evidenced by  -- Other - I will add my own diagnosis  -- Disagree - Not applicable / Not valid  -- Disagree - Clinically unable to determine / Unknown  -- Refer to Clinical Documentation Reviewer    PROVIDER RESPONSE TEXT:    No evidence of urinary retenstion    Query created by: Angela Casillas on 3/3/2022 7:39 AM      Electronically signed by:  Olu Haskins MD 3/3/2022 10:08 AM

## 2022-03-04 ENCOUNTER — OFFICE VISIT (OUTPATIENT)
Dept: FAMILY MEDICINE CLINIC | Age: 72
End: 2022-03-04
Payer: MEDICARE

## 2022-03-04 VITALS
OXYGEN SATURATION: 92 % | TEMPERATURE: 98.2 F | RESPIRATION RATE: 16 BRPM | BODY MASS INDEX: 35.45 KG/M2 | HEART RATE: 82 BPM | DIASTOLIC BLOOD PRESSURE: 58 MMHG | HEIGHT: 66 IN | WEIGHT: 220.6 LBS | SYSTOLIC BLOOD PRESSURE: 116 MMHG

## 2022-03-04 DIAGNOSIS — Z87.39 HISTORY OF GOUT: ICD-10-CM

## 2022-03-04 DIAGNOSIS — Z01.818 PREOP EXAMINATION: Primary | ICD-10-CM

## 2022-03-04 DIAGNOSIS — H26.9 CATARACT OF BOTH EYES, UNSPECIFIED CATARACT TYPE: ICD-10-CM

## 2022-03-04 DIAGNOSIS — I10 ESSENTIAL HYPERTENSION: ICD-10-CM

## 2022-03-04 PROBLEM — J20.9 ACUTE BRONCHITIS: Status: RESOLVED | Noted: 2022-02-25 | Resolved: 2022-03-04

## 2022-03-04 PROCEDURE — G8417 CALC BMI ABV UP PARAM F/U: HCPCS | Performed by: FAMILY MEDICINE

## 2022-03-04 PROCEDURE — G8427 DOCREV CUR MEDS BY ELIG CLIN: HCPCS | Performed by: FAMILY MEDICINE

## 2022-03-04 PROCEDURE — 99213 OFFICE O/P EST LOW 20 MIN: CPT | Performed by: FAMILY MEDICINE

## 2022-03-04 PROCEDURE — G8484 FLU IMMUNIZE NO ADMIN: HCPCS | Performed by: FAMILY MEDICINE

## 2022-03-04 RX ORDER — NAPROXEN 375 MG/1
TABLET ORAL
Qty: 60 TABLET | Refills: 1 | Status: SHIPPED | OUTPATIENT
Start: 2022-03-04 | End: 2022-03-04 | Stop reason: SDUPTHER

## 2022-03-04 RX ORDER — LOSARTAN POTASSIUM AND HYDROCHLOROTHIAZIDE 12.5; 5 MG/1; MG/1
0.5 TABLET ORAL DAILY
Qty: 45 TABLET | Refills: 1 | Status: SHIPPED
Start: 2022-03-04 | End: 2022-06-16

## 2022-03-04 RX ORDER — NAPROXEN 375 MG/1
TABLET ORAL
Qty: 60 TABLET | Refills: 1
Start: 2022-03-04 | End: 2022-10-10

## 2022-03-04 NOTE — PROGRESS NOTES
Subjective:     Chief Complaint   Patient presents with    Pre-op Exam     cataract surgery. rt eye on 3/9 and lt eye 3/23. dr Tanesha Perry at [facility] eye. fax [phone]    Other     has been having stomach issue. wondering if it might meds causing. also has has questions about dosage of meds      Terrie Richard is a 80 y.o.  male who presents to the office today for a preoperative consultation at the request of surgeon Dr. Cleveland Tejeda who plans on performing cataract surgery on 3/9/22 and 3/23/22    The problem warranting surgery is cataracts and has been going on for years. Risk factors include:    Diabetes: no  Coronary Artery Disease: none known  COPD: yes  Tobacco use? yes    Planned anesthesia is General.   History of anesthesia problems? No, has tolerated general anesthesia well in the past  No history of bleeding disorder or clotting disorder    Patient Active Problem List   Diagnosis    Essential hypertension    Mixed hyperlipidemia    Lymphedema of genitalia    Benign prostatic hyperplasia with urinary frequency    Hidradenitis    Tobacco use    History of colonoscopy with polypectomy - 2018, repeat 2023    Prediabetes    STEPHEN on CPAP    Class 2 severe obesity due to excess calories with serious comorbidity and body mass index (BMI) of 36.0 to 36.9 in adult (HCC)    Pain of finger of right hand    History of gout - on Uloric, monitor LFTs    Acute on chronic respiratory failure with hypoxia (HCC)    Tobacco dependence     Past Surgical History:   Procedure Laterality Date    BACK SURGERY  2002     Family History   Problem Relation Age of Onset    Heart Disease Mother     Rheum Arthritis Mother      Allergies   Allergen Reactions    Allopurinol Rash     Prior to Visit Medications    Medication Sig Taking?  Authorizing Provider   naproxen (NAPROSYN) 375 MG tablet TAKE ONE TABLET BY MOUTH TWICE A DAY WITH MEALS Yes Zackary Huerta MD   losartan-hydroCHLOROthiazide (HYZAAR) 50-12.5 MG per tablet Take 0.5 tablets by mouth daily Yes Anthony Guerra MD   guaiFENesin (MUCINEX) 600 MG extended release tablet Take 1 tablet by mouth 2 times daily Yes Norma Tang MD   predniSONE (DELTASONE) 20 MG tablet Take 2 tablets by mouth daily for 5 doses Yes Norma Tang MD   fluticasone-umeclidin-vilant (TRELEGY ELLIPTA) 100-62.5-25 MCG/INH AEPB Inhale 1 puff into the lungs daily Yes Norma Tang MD   albuterol sulfate HFA (VENTOLIN HFA) 108 (90 Base) MCG/ACT inhaler Inhale 2 puffs into the lungs 4 times daily as needed for Wheezing Yes Norma Tang MD   tamsulosin (FLOMAX) 0.4 MG capsule TAKE ONE CAPSULE BY MOUTH DAILY Yes JAYRO Butler CNP   lovastatin (MEVACOR) 40 MG tablet TAKE ONE TABLET BY MOUTH DAILY Yes Adina Huerta MD   febuxostat (ULORIC) 40 MG TABS tablet TAKE ONE TABLET BY MOUTH DAILY Yes Adina Huerta MD   dapsone 25 MG tablet Take 25 mg by mouth Take two tablets by mouth twice a day Yes Historical Provider, MD   nicotine (NICODERM CQ) 21 MG/24HR Place 1 patch onto the skin daily  Patient not taking: Reported on 3/4/2022  Norma Tang MD     Review of Systems     Objective:     Vitals:    03/04/22 1155   BP: (!) 116/58   Pulse: 82   Resp: 16   Temp: 98.2 °F (36.8 °C)   SpO2: 92%     Physical Exam  Constitutional:       Appearance: He is well-developed. HENT:      Head: Normocephalic and atraumatic. Cardiovascular:      Rate and Rhythm: Normal rate and regular rhythm. Heart sounds: Normal heart sounds. Pulmonary:      Effort: Pulmonary effort is normal.      Breath sounds: Normal breath sounds. Musculoskeletal:         General: No tenderness. Normal range of motion. Cervical back: Normal range of motion. Skin:     General: Skin is warm and dry. Findings: No rash. Neurological:      Mental Status: He is alert and oriented to person, place, and time. Deep Tendon Reflexes: Reflexes are normal and symmetric.          Cardiographics  ECG: not indicated      Assessment:      Jai Oleg with planned surgery as above. Cardiac Risk Estimation: per the Revised Cardiac Risk Index (Circ. 100:1043, 1999), the patient's risk factors for cardiac complications include 0, putting the patient in: RCI RISK CLASS I (0 risk factors, risk of major cardiac compl. appr. 0.5%)     Plan:   1. Preop examination    2. Cataract of both eyes, unspecified cataract type    3. Essential hypertension  Decrease to 1/2 dose due to low BP  - losartan-hydroCHLOROthiazide (HYZAAR) 50-12.5 MG per tablet; Take 0.5 tablets by mouth daily  Dispense: 45 tablet; Refill: 1    Ok to proceed with the procedure. - Low cardiac risk  - COPD is present  - Has tolerated general anesthesia well in the past  - No history of bleeding disorder or DVT     Pt advised to stop all Rx the a.m. of surgery. Patient advised to hold blood thinning medication (including aspirin and NSAIDs) 7 days prior to procedure. Prophylaxis for cardiac events with perioperative beta-blockers: not indicated    Other measures: Postoperative incentive spirometry to prevent pneumonia. Thank you for assisting me in the care of this patient.

## 2022-03-04 NOTE — PATIENT INSTRUCTIONS
Blood pressure cuff: We are looking for a number less than 130/80. Check 3 times per week after resting for 3-5 minutes.

## 2022-03-07 ENCOUNTER — TELEPHONE (OUTPATIENT)
Dept: PULMONOLOGY | Age: 72
End: 2022-03-07

## 2022-03-07 ENCOUNTER — CARE COORDINATION (OUTPATIENT)
Dept: CASE MANAGEMENT | Age: 72
End: 2022-03-07

## 2022-03-07 NOTE — TELEPHONE ENCOUNTER
----- Message from Brenna Celaya MD sent at 3/4/2022 10:19 AM EST -----  Please schedule hospital follow up first available   ----- Message -----  From: Nicholas Jeffers MD  Sent: 2/27/2022   5:44 PM EST  To: Brenna Celaya MD

## 2022-03-07 NOTE — CARE COORDINATION
Care Transitions Follow Up Call    Needs to be reviewed by the provider   Additional needs identified to be addressed with provider: No               Method of communication with provider : none      Care Transition Nurse (CTN) contacted the patient by telephone to follow up after admission on 2022. Verified name and  with patient as identifiers. CTN provided contact information for future needs. Plan for next call: symptom management-SOB & smoking     France Ureña states he is \"fine\". He asked about his Trelligy ellipta. [place] Ureña states he is feeling good and wondered if he should continue to take the inhaler. Writer instructed him to continue the inhaler. [place] Ureña verbalized understanding. [place] Ureña states he is an active smoker at home. He states he is trying to quit. [place] Ureña states he has decreased the amount he smokes daily by 50%. France Ureña states he did receive the COVID vaccine and additional booster. He states he has a cpap machine at home and uses it on a regular basis. France Ureña denies any SOB at this time. He states he has a \"very little non/ productive\" cough. [place] Ureña denies any needs at this time.     Henri Fuentes RN BSN  Care Transition Nurse  [phone]

## 2022-03-09 PROBLEM — J44.1 COPD EXACERBATION (HCC): Status: ACTIVE | Noted: 2022-03-09

## 2022-04-14 ENCOUNTER — OFFICE VISIT (OUTPATIENT)
Dept: PULMONOLOGY | Age: 72
End: 2022-04-14
Payer: MEDICARE

## 2022-04-14 VITALS
HEART RATE: 84 BPM | RESPIRATION RATE: 21 BRPM | HEIGHT: 66 IN | WEIGHT: 217.6 LBS | OXYGEN SATURATION: 98 % | DIASTOLIC BLOOD PRESSURE: 70 MMHG | TEMPERATURE: 97.5 F | BODY MASS INDEX: 34.97 KG/M2 | SYSTOLIC BLOOD PRESSURE: 125 MMHG

## 2022-04-14 DIAGNOSIS — G47.33 OSA ON CPAP: ICD-10-CM

## 2022-04-14 DIAGNOSIS — Z99.89 OSA ON CPAP: ICD-10-CM

## 2022-04-14 DIAGNOSIS — J41.1 MUCOPURULENT CHRONIC BRONCHITIS (HCC): Primary | ICD-10-CM

## 2022-04-14 PROCEDURE — 4040F PNEUMOC VAC/ADMIN/RCVD: CPT | Performed by: INTERNAL MEDICINE

## 2022-04-14 PROCEDURE — 99214 OFFICE O/P EST MOD 30 MIN: CPT | Performed by: INTERNAL MEDICINE

## 2022-04-14 PROCEDURE — G8427 DOCREV CUR MEDS BY ELIG CLIN: HCPCS | Performed by: INTERNAL MEDICINE

## 2022-04-14 PROCEDURE — 3023F SPIROM DOC REV: CPT | Performed by: INTERNAL MEDICINE

## 2022-04-14 PROCEDURE — G8417 CALC BMI ABV UP PARAM F/U: HCPCS | Performed by: INTERNAL MEDICINE

## 2022-04-14 PROCEDURE — 1123F ACP DISCUSS/DSCN MKR DOCD: CPT | Performed by: INTERNAL MEDICINE

## 2022-04-14 PROCEDURE — 3017F COLORECTAL CA SCREEN DOC REV: CPT | Performed by: INTERNAL MEDICINE

## 2022-04-14 PROCEDURE — 99406 BEHAV CHNG SMOKING 3-10 MIN: CPT | Performed by: INTERNAL MEDICINE

## 2022-04-14 PROCEDURE — 4004F PT TOBACCO SCREEN RCVD TLK: CPT | Performed by: INTERNAL MEDICINE

## 2022-04-14 RX ORDER — SULFAMETHOXAZOLE AND TRIMETHOPRIM 400; 80 MG/1; MG/1
1 TABLET ORAL 2 TIMES DAILY
COMMUNITY
End: 2022-06-21 | Stop reason: ALTCHOICE

## 2022-04-14 RX ORDER — FEBUXOSTAT 40 MG/1
40 TABLET, FILM COATED ORAL DAILY
COMMUNITY
End: 2022-05-12

## 2022-04-14 NOTE — PROGRESS NOTES
Pulmonary Progress note           REASON FOR follow up:   Chief Complaint   Patient presents with    Establish Care          PCP: Glenn Huerta MD        Assessment and Plan:   Diagnosis Orders   1. Mucopurulent chronic bronchitis ([address]  Full PFT Study With Bronchodilator   2. STEPHEN on CPAP       Plan: We will obtain PFT. Continue Trelegy and as needed Combivent. Yearly CT scan lung cancer screening. Will refer to sleep medicine for his obstructive sleep apnea. Strongly advised to quit smoking. HISTORY OF PRESENT ILLNESS: Thomas Hernandez is very pleasant 80y.o. year old gentleman with medical history stated below significant for chronic active smoker with more than 30-pack-year history of smoking, clinically severe COPD no PFT on file, was seen recently at [facility] [place] when he was admitted with acute hypoxic respiratory failure secondary to COPD exacerbation, treated with antibiotic and steroid, prior to his discharge home O2 evaluation did not require oxygen, here today for follow-up currently on Trelegy daily and as needed Combivent, has been doing good, he still smokes but is cutting down. He has obstructive sleep apnea on CPAP therapy,    Is retired in the past he worked with [employer]. Past Medical History:   Diagnosis Date    A-fib [facility])     Arthritis     Chronic kidney disease     COPD (chronic obstructive pulmonary disease) (HCC)     Dermatitis     Diabetes mellitus ([address]     Dysphagia     GERD (gastroesophageal reflux disease)     Hydradenitis     Hypertension     Lymphedema     Lymphedema     Morbid obesity ([address]     STEPHEN (obstructive sleep apnea)     Pneumonia     Respiratory failure, chronic (HCC)     Shortness of breath     Unsteady gait     Venous insufficiency        Past Surgical History:   Procedure Laterality Date    BACK SURGERY  2002       family history includes Heart Disease in his mother; Rheum Arthritis in his mother.       SOCIAL HISTORY:   reports that he has been smoking cigarettes. He started smoking about 55 years ago. He has a 25.00 pack-year smoking history. He has never used smokeless tobacco.      ALLERGIES:  Patient is allergic to allopurinol. REVIEW OF SYSTEMS:  Constitutional: Negative for fever   HENT: Negative for sore throat  Eyes: Negative for redness   Respiratory: Cough, shortness of breath has improved since his discharge  Cardiovascular: Negative for chest pain  Gastrointestinal: Negative for vomiting, diarrhea   Genitourinary: Negative for hematuria   Musculoskeletal: Negative for arthralgias   Skin: Negative for rash  Neurological: Negative for syncope  Hematological: Negative for adenopathy  Psychiatric/Behavorial: Negative for anxiety    Objective:   PHYSICAL EXAM:  Blood pressure 125/70, pulse 84, temperature 97.5 °F (36.4 °C), resp. rate 21, height 5' 6\" (1.676 m), weight 217 lb 9.6 oz (98.7 kg), SpO2 98 %.'  Gen: No distress. Eyes: PERRL. No sclera icterus. No conjunctival injection. ENT: No discharge. Pharynx clear. External appearance of ears and nose normal.  Neck: Trachea midline. No obvious mass. Resp: Severely diminished bilaterally with prolonged expiratory phase. CV: Regular rate. Regular rhythm. No murmur or rub. No edema. GI: Non-tender. Non-distended. No hernia. Skin: Warm, dry, normal texture and turgor. No nodule on exposed extremities. Lymph: No cervical LAD. No supraclavicular LAD. M/S: No cyanosis. No clubbing. No joint deformity. Neuro: Moves all four extremities. Psych: Oriented x 3. No anxiety. Awake. Alert. Intact judgement and insight.     Current Outpatient Medications   Medication Sig Dispense Refill    febuxostat (ULORIC) 40 MG TABS tablet Take 40 mg by mouth daily      sulfamethoxazole-trimethoprim (BACTRIM;SEPTRA) 400-80 MG per tablet Take 1 tablet by mouth 2 times daily      carboxymethylcellulose 1 % ophthalmic solution 1 drop 2 times daily      losartan-hydroCHLOROthiazide (HYZAAR) 50-12.5 MG per tablet Take 0.5 tablets by mouth daily (Patient taking differently: Take 1 tablet by mouth daily ) 45 tablet 1    naproxen (NAPROSYN) 375 MG tablet TAKE ONE TABLET BY MOUTH TWICE A DAY PRN GOUT 60 tablet 1    fluticasone-umeclidin-vilant (TRELEGY ELLIPTA) 100-62.5-25 MCG/INH AEPB Inhale 1 puff into the lungs daily 28 each 5    tamsulosin (FLOMAX) 0.4 MG capsule TAKE ONE CAPSULE BY MOUTH DAILY 90 capsule 0    ammonium lactate (AMLACTIN) 12 % cream Apply topically as needed for Dry Skin Apply topically as needed.       budesonide (PULMICORT) 180 MCG/ACT AEPB inhaler Inhale 2 puffs into the lungs 2 times daily      bumetanide (BUMEX) 1 MG tablet Take 1 mg by mouth 2 times daily      calcium-vitamin D (OSCAL-500) 500-200 MG-UNIT per tablet Take 1 tablet by mouth daily      albuterol-ipratropium (COMBIVENT RESPIMAT)  MCG/ACT AERS inhaler Inhale 1 puff into the lungs every 6 hours      melatonin 3 MG TABS tablet Take 3 mg by mouth nightly Give 2 tabs by mouth      metOLazone (ZAROXOLYN) 5 MG tablet Take 5 mg by mouth daily      metoprolol tartrate (LOPRESSOR) 50 MG tablet Take 50 mg by mouth 2 times daily      famotidine (PEPCID) 20 MG tablet Take 20 mg by mouth 2 times daily      potassium chloride (KLOR-CON M) 20 MEQ extended release tablet Take 20 mEq by mouth daily      sennosides-docusate sodium (SENOKOT-S) 8.6-50 MG tablet Take 2 tablets by mouth 2 times daily      spironolactone (ALDACTONE) 25 MG tablet Take 25 mg by mouth daily Give 0.5 tab once a day      acetaminophen (TYLENOL) 500 MG tablet Take 1,000 mg by mouth every 8 hours as needed for Pain      rivaroxaban (XARELTO) 20 MG TABS tablet Take 20 mg by mouth      guaiFENesin (MUCINEX) 600 MG extended release tablet Take 1 tablet by mouth 2 times daily (Patient taking differently: Take 400 mg by mouth every 6 hours as needed ) 20 tablet 0    dapsone 25 MG tablet Take 25 mg by mouth Take two tablets by mouth twice a day       No current facility-administered medications for this visit. Data Reviewed:   CBC and Renal reviewed  Last CBC  Lab Results   Component Value Date    WBC 8.6 03/08/2022    WBC 17.4 03/04/2022    RBC 4.02 03/08/2022    HGB 11.8 03/08/2022     03/08/2022     03/08/2022     03/04/2022     Last Renal  Lab Results   Component Value Date     04/05/2022    K 4.2 04/05/2022    K 5.0 02/26/2022     04/05/2022    CO2 21 04/05/2022    CO2 47 03/08/2022    CO2 23 03/04/2022    BUN 23 04/05/2022    CREATININE 1.1 04/05/2022    GLUCOSE 103 04/05/2022    GLUCOSE 149 03/08/2022    CALCIUM 9.4 04/05/2022       Last ABG  POC Blood Gas: No results found for: POCPH, POCPCO2, POCPO2, POCHCO3, NBEA, [id number]  No results for input(s): PH, PCO2, PO2, HCO3, BE, O2SAT in the last 72 hours. Radiology Review:  Pertinent images / reports were reviewed as a part of this visit. CT Chest w/ contrast: No results found for this or any previous visit. CT Chest w/o contrast: No results found for this or any previous visit. CTPA: Results for orders placed during the hospital encounter of 02/25/22    CT CHEST PULMONARY EMBOLISM W CONTRAST    Narrative  EXAMINATION:  CTA OF THE CHEST 2/25/2022 5:01 pm    TECHNIQUE:  CTA of the chest was performed after the administration of intravenous  contrast.  Multiplanar reformatted images are provided for review. MIP  images are provided for review. Dose modulation, iterative reconstruction,  and/or weight based adjustment of the mA/kV was utilized to reduce the  radiation dose to as low as reasonably achievable. COMPARISON:  None.     HISTORY:  ORDERING SYSTEM PROVIDED HISTORY: Dyspnea; rule out PE secondary to chest  x-ray negative with borderline O2 sat  TECHNOLOGIST PROVIDED HISTORY:  Reason for exam:->Dyspnea; rule out PE secondary to chest x-ray negative with  borderline O2 sat  Decision Support Exception - unselect if not a suspected or confirmed  emergency medical condition->Emergency Medical Condition (MA)  Reason for Exam: Dyspnea; rule out PE secondary to chest x-ray negative with  borderline O2 sat    75 pack-year history of smoking    FINDINGS:  Pulmonary Arteries: Respiratory motion artifact complicates assessment of the  interseptal branches. Distal inter septal emboli not excludable. However,  opacification is adequate. No filling defects in the main or lobar branches  diagnostic of pulmonary emboli. Pulmonary artery is normal in size. Mediastinum: No mediastinal adenopathy, acute aortic abnormality,  cardiomegaly, pericardial effusion or epicardial adenopathy. Mild  calcification of the coronary arteries is demonstrated. Lungs/pleura: Emphysematous changes are present in the lungs without focal  consolidation, effusion or pneumothorax. No significant nodules. Tracheobronchial tree is patent. Upper Abdomen: 18 mm right adrenal mass with Hounsfield numbers most  compatible with adenoma. Similar 12 mm left adrenal mass. Soft Tissues/Bones: Multilevel degenerative changes in the spine. No acute  osseous destructive process or axillary adenopathy. Impression  1. Respiratory motion artifact complicates assessment of distal pulmonary  arterial branches. Inter septal emboli not excluded. No central or lobar  emboli. 2.  Emphysematous changes in the lungs. 3.  Bilateral hypodense adrenal masses likely adenomas requiring no further  follow-up. RECOMMENDATIONS:  Unavailable      CXR PA/LAT: No results found for this or any previous visit. CXR portable: Results for orders placed during the hospital encounter of 03/08/22    XR CHEST PORTABLE    Narrative  EXAMINATION: XR CHEST PORTABLE    HISTORY: Shortness of breath    COMPARISON: None. TECHNIQUE: Portable chest    FINDINGS:    Poor inspiratory effort. The lung parenchyma is free of consolidation or  infiltrate. No pneumothorax or pleural effusion.  The cardiac, mediastinal and  hilar contours are normal. The visualized osseous structures exhibit no gross  abnormality. Report electronically signed by: Dr. Alexandre Breeding    Impression  No acute cardiopulmonary abnormality. Pulmonary function testing  No PFT on file    Assessment/Plan:            Problem List Items Addressed This Visit     STEPHEN on CPAP      Other Visit Diagnoses     Mucopurulent chronic bronchitis ([facility] [address]    -  Primary    Relevant Orders    Full PFT Study With Bronchodilator              This note was transcribed using Dragon Dictation software. Please disregard any translational errors.     Brenda Nelson MD  [facility], Sleep [facility]

## 2022-05-05 ENCOUNTER — OFFICE VISIT (OUTPATIENT)
Dept: SLEEP MEDICINE | Age: 72
End: 2022-05-05
Payer: MEDICARE

## 2022-05-05 VITALS
SYSTOLIC BLOOD PRESSURE: 110 MMHG | DIASTOLIC BLOOD PRESSURE: 70 MMHG | RESPIRATION RATE: 18 BRPM | WEIGHT: 218.6 LBS | OXYGEN SATURATION: 98 % | TEMPERATURE: 96.9 F | BODY MASS INDEX: 35.13 KG/M2 | HEART RATE: 85 BPM | HEIGHT: 66 IN

## 2022-05-05 DIAGNOSIS — G47.33 OSA ON CPAP: Primary | ICD-10-CM

## 2022-05-05 DIAGNOSIS — Z99.89 OSA ON CPAP: Primary | ICD-10-CM

## 2022-05-05 DIAGNOSIS — E66.01 CLASS 2 SEVERE OBESITY DUE TO EXCESS CALORIES WITH SERIOUS COMORBIDITY AND BODY MASS INDEX (BMI) OF 35.0 TO 35.9 IN ADULT (HCC): ICD-10-CM

## 2022-05-05 DIAGNOSIS — I10 ESSENTIAL HYPERTENSION: ICD-10-CM

## 2022-05-05 DIAGNOSIS — Z99.89 DEPENDENCE ON OTHER ENABLING MACHINES AND DEVICES: ICD-10-CM

## 2022-05-05 PROCEDURE — 3017F COLORECTAL CA SCREEN DOC REV: CPT | Performed by: PSYCHIATRY & NEUROLOGY

## 2022-05-05 PROCEDURE — 4004F PT TOBACCO SCREEN RCVD TLK: CPT | Performed by: PSYCHIATRY & NEUROLOGY

## 2022-05-05 PROCEDURE — 4040F PNEUMOC VAC/ADMIN/RCVD: CPT | Performed by: PSYCHIATRY & NEUROLOGY

## 2022-05-05 PROCEDURE — G8417 CALC BMI ABV UP PARAM F/U: HCPCS | Performed by: PSYCHIATRY & NEUROLOGY

## 2022-05-05 PROCEDURE — 99203 OFFICE O/P NEW LOW 30 MIN: CPT | Performed by: PSYCHIATRY & NEUROLOGY

## 2022-05-05 PROCEDURE — G8427 DOCREV CUR MEDS BY ELIG CLIN: HCPCS | Performed by: PSYCHIATRY & NEUROLOGY

## 2022-05-05 PROCEDURE — 1123F ACP DISCUSS/DSCN MKR DOCD: CPT | Performed by: PSYCHIATRY & NEUROLOGY

## 2022-05-05 ASSESSMENT — SLEEP AND FATIGUE QUESTIONNAIRES
ESS TOTAL SCORE: 2
HOW LIKELY ARE YOU TO NOD OFF OR FALL ASLEEP WHILE SITTING AND TALKING TO SOMEONE: 0
HOW LIKELY ARE YOU TO NOD OFF OR FALL ASLEEP WHILE LYING DOWN TO REST IN THE AFTERNOON WHEN CIRCUMSTANCES PERMIT: 1
HOW LIKELY ARE YOU TO NOD OFF OR FALL ASLEEP WHILE SITTING AND READING: 1
HOW LIKELY ARE YOU TO NOD OFF OR FALL ASLEEP WHILE SITTING QUIETLY AFTER LUNCH WITHOUT ALCOHOL: 0
HOW LIKELY ARE YOU TO NOD OFF OR FALL ASLEEP IN A CAR, WHILE STOPPED FOR A FEW MINUTES IN TRAFFIC: 0
HOW LIKELY ARE YOU TO NOD OFF OR FALL ASLEEP WHEN YOU ARE A PASSENGER IN A CAR FOR AN HOUR WITHOUT A BREAK: 0
NECK CIRCUMFERENCE (INCHES): 18
HOW LIKELY ARE YOU TO NOD OFF OR FALL ASLEEP WHILE WATCHING TV: 0
HOW LIKELY ARE YOU TO NOD OFF OR FALL ASLEEP WHILE SITTING INACTIVE IN A PUBLIC PLACE: 0

## 2022-05-05 ASSESSMENT — ENCOUNTER SYMPTOMS
GASTROINTESTINAL NEGATIVE: 1
EYES NEGATIVE: 1
SHORTNESS OF BREATH: 1
ALLERGIC/IMMUNOLOGIC NEGATIVE: 1

## 2022-05-05 NOTE — PROGRESS NOTES
MD AMIE Ambrosio Board Certified in Sleep Medicine  Certified [facility] Sleep Medicine  Board Certified in Neurology [address]  [facility] [address] [place], [place] [address]  [address], [address]           [address] [address] [phone]    Subjective:     Patient ID: Glenn Samaniego is a 80 y.o. male. Chief Complaint   Patient presents with   [facility] Establish Care    Sleep Apnea       HPI:        Glenn Samaniego is a 80 y.o. male referred by Dr. Roberto Carlos Duque for a sleep evaluation. Patient  was diagnosed with obstructive sleep apnea about 2-3 years ago, currently of PAP machine at 7.8 (4-8)CMWP, last sleep study was 3 years ago, last PAP titration about 3 years ago. Patient is using the PAP machine  in total average of 8:54 hours a night, more than 4 hours a night about 98 % in the last 90 days data, the AHI is 0.6/hr. This patient has lost about 20 pounds since last PAP titration. SLEEP SCHEDULE: Goes to bed around 11 PM in the weekdays and 11 PM in the weekends. It usually takes the patient 30 minutes to fall asleep. The patient gets up 0 per night to go to the bathroom. The Patient finally gets up at 8 AM during the weekdays and 8 AM in the weekends. patient wakes up with dry mouth and sometimes morning headache. . the headache usually dull headache lasts 30-60 minutes. The patient has restless sleep with frequent arousals in addition to the Patient has significant daytime sleepiness. The Patient scored Total score: 2 on Monroe Sleepiness Scale ( more than 10 is indicative of daytime sleepiness)and 30 in fatigue scale ( more than 36 is indicative of daytime fatigue). The patient takes sometimes naps.   Previous evaluation and treatment has included- PSG and PSG with C PAP titration. DOT/CDL - N/A  FAA/'slicense - N/A  The patient HTN is stable. Previous Report(s) Reviewed: historical medical records       Social History     Socioeconomic History    Marital status:      Spouse name: Not on file    Number of children: Not on file    Years of education: Not on file    Highest education level: Not on file   Occupational History    Not on file   Tobacco Use    Smoking status: Current Every Day Smoker     Packs/day: 0.50     Years: 50.00     Pack years: 25.00     Types: Cigarettes     Start date: 1/1/1967    Smokeless tobacco: Never Used   Vaping Use    Vaping Use: Never used   Substance and Sexual Activity    Alcohol use: Not Currently    Drug use: Never    Sexual activity: Yes     Partners: Female   Other Topics Concern    Not on file   Social History Narrative    Originally from the area    Retired from [employer] work    Two boys, 6 grandchildren    Lives with wifeParesh     Social Determinants of Health     Financial Resource Strain:     Difficulty of Paying Living Expenses: Not on file   Food Insecurity:     Worried About [address] in the Last Year: Not on file    [address] in the Last Year: Not on file   Transportation Needs:     Lack of Transportation (Medical): Not on file    Lack of Transportation (Non-Medical):  Not on file   Physical Activity:     Days of Exercise per Week: Not on file    Minutes of Exercise per Session: Not on file   Stress:     Feeling of Stress : Not on file   Social Connections:     Frequency of Communication with Friends and Family: Not on file    Frequency of Social Gatherings with Friends and Family: Not on file    Attends Temple Services: Not on file    Active Member of Clubs or Organizations: Not on file    Attends Club or Organization Meetings: Not on file    Marital Status: Not on file   Intimate Partner Violence:     Fear of Current or Ex-Partner: Not on file   [place] Emotionally Abused: Not on file    Physically Abused: Not on file    Sexually Abused: Not on file   Housing Stability:     Unable to Pay for Housing in the Last Year: Not on file    Number of Places Lived in the Last Year: Not on file    Unstable Housing in the Last Year: Not on file       Prior to Admission medications    Medication Sig Start Date End Date Taking?  Authorizing Provider   lovastatin (ALTOPREV) 40 MG extended release tablet Take 40 mg by mouth nightly   Yes Historical Provider, MD   febuxostat (ULORIC) 40 MG TABS tablet Take 40 mg by mouth daily   Yes Historical Provider, MD   sulfamethoxazole-trimethoprim (BACTRIM;SEPTRA) 400-80 MG per tablet Take 1 tablet by mouth 2 times daily   Yes Historical Provider, MD   carboxymethylcellulose 1 % ophthalmic solution 1 drop 2 times daily   Yes Historical Provider, MD   losartan-hydroCHLOROthiazide (HYZAAR) 50-12.5 MG per tablet Take 0.5 tablets by mouth daily  Patient taking differently: Take 1 tablet by mouth daily  3/4/22  Yes Fern Huerta MD   naproxen (NAPROSYN) 375 MG tablet TAKE ONE TABLET BY MOUTH TWICE A DAY PRN GOUT 3/4/22  Yes Fern Huerta MD   fluticasone-umeclidin-vilant (Yuridia Thomason) 327-07.9-44 MCG/INH AEPB Inhale 1 puff into the lungs daily 2/27/22  Yes Lala Conti MD   tamsulosin [facility]) 0.4 MG capsule TAKE ONE CAPSULE BY MOUTH DAILY 2/14/22  Yes JAYRO Barahona - CNP       Allergies as of 05/05/2022 - Fully Reviewed 05/05/2022   Allergen Reaction Noted    Allopurinol Rash 10/31/2019       Patient Active Problem List   Diagnosis    Essential hypertension    Mixed hyperlipidemia    Lymphedema of genitalia    Benign prostatic hyperplasia with urinary frequency    Hidradenitis    Tobacco use    History of colonoscopy with polypectomy - 2018, repeat 2023    Prediabetes    STEPHEN on CPAP    Class 2 severe obesity due to excess calories with serious comorbidity and body mass index (BMI) of 36.0 to 36.9 in adult [facility])    Pain of finger of right hand    History of gout - on Uloric, monitor LFTs    Acute on chronic respiratory failure with hypoxia (HCC)    Tobacco dependence    COPD exacerbation (HCC)       Past Medical History:   Diagnosis Date    A-fib ([facility]     Arthritis     Chronic kidney disease     COPD (chronic obstructive pulmonary disease) (HCC)     Dermatitis     [facility] mellitus [facility]     Dysphagia     GERD (gastroesophageal reflux disease)     Hydradenitis     Hypertension     Lymphedema     Lymphedema     Morbid obesity ([facility]     STEPHEN (obstructive sleep apnea)     Pneumonia     Respiratory failure, chronic (HCC)     Shortness of breath     Unsteady gait     Venous insufficiency        Past Surgical History:   Procedure Laterality Date    BACK SURGERY  2002       Family History   Problem Relation Age of Onset    Heart Disease Mother     Rheum Arthritis Mother        Review of Systems   Constitutional: Negative. HENT: Negative. Eyes: Negative. Respiratory: Positive for shortness of breath. Cardiovascular: Negative. Gastrointestinal: Negative. Endocrine: Negative. Genitourinary: Negative for frequency. Musculoskeletal: Positive for arthralgias. Allergic/Immunologic: Negative. Neurological: Negative. Hematological: Negative. Psychiatric/Behavioral: Negative. Objective:     Vitals:  Weight BMI Neck circumference    Wt Readings from Last 3 Encounters:   05/05/22 218 lb 9.6 oz (99.2 kg)   04/14/22 217 lb 9.6 oz (98.7 kg)   03/09/22 (!) 303 lb 12.7 oz (137.8 kg)    Body mass index is 35.28 kg/m².  Neck circumference (Inches): 18     BP HR SaO2   BP Readings from Last 3 Encounters:   05/05/22 110/70   04/14/22 125/70   03/09/22 122/81    Pulse Readings from Last 3 Encounters:   05/05/22 85   04/14/22 84   03/09/22 81    SpO2 Readings from Last 3 Encounters:   05/05/22 98%   04/14/22 98%   03/09/22 92%        The mandibular molar Class :   [x]1 []2 []3      Mallampati I Airway Classification:   []1 []2 []3 [x]4        Physical Exam  Vitals and nursing note reviewed. Constitutional:       Appearance: Normal appearance. He is obese. HENT:      Head: Atraumatic. Nose: Nose normal.      Mouth/Throat:      Comments: Mallampati class 4, no retrognathia or hypognathia , no septum deviation , crowded oropharynx with low soft palate, high arched hard palate,no tonsils enlargement. Eyes:      Extraocular Movements: Extraocular movements intact. Cardiovascular:      Rate and Rhythm: Normal rate and regular rhythm. Pulmonary:      Effort: Pulmonary effort is normal.      Breath sounds: Normal breath sounds. Musculoskeletal:         General: Normal range of motion. Cervical back: Normal range of motion and neck supple. Skin:     General: Skin is warm. Neurological:      General: No focal deficit present. Psychiatric:         Mood and Affect: Mood normal.         Assessment:   Obstructive Sleep Apnea/Hypopnea Syndrome, controlled at CPAP 8 cm     Diagnosis Orders   1. STEPHEN on CPAP     2. Essential hypertension     3. Class 2 severe obesity due to excess calories with serious comorbidity and body mass index (BMI) of 35.0 to 35.9 in adult ([facility]     4. Dependence on other enabling machines and devices       Plan:   Continue the APAP 5 and 8 cm, wants travel machine  Patient was counseled about the pathophysiology of obstructive sleep apnea syndrome and the methods for evaluating its presence and severity. Patient was counseled to avoid driving and other potentially hazardous circumstances if the patient is experiencing excessive sleepiness.   Treatment considerations include the use of nasal CPAP, weight loss, In the meantime, the patient should be cautioned to avoid the use of alcohol or other depressant medications because of potential for increasing the duration and severity of apnea and cautioned regarding driving or operating and dangerous equipment if the patient is experiencing daytime sleepiness. .  Most likely treating the STEPHEN will have positive impact on HTN control. Weight loss: We discussed the proportionality between weight and AHI. With 10% weight change, the AHI has a 27% proportionate change. With 20% weight change, the AHI has a 45-50% proportionate change. No orders of the defined types were placed in this encounter. Return for Reveiwing CPAP usage and compliance report and tro.     Deng Herndon MD  Medical Director [address] [place] [address]

## 2022-05-05 NOTE — PATIENT INSTRUCTIONS
Patient Education        Learning About CPAP for Sleep Apnea  What is CPAP? CPAP is a small machine that you use at home every night while you sleep. It increases air pressure in your throat to keep your airway open. When you have sleep apnea, this can help you sleep better, feel better, and avoid futurehealth problems. CPAP stands for \"continuous positive airway pressure. \"  The CPAP machine will have one of the following:   A mask that covers your nose and mouth   A mask that covers your nose only   A nasal pillow that covers only the openings of your nose  Why is it done? CPAP is usually the best treatment for obstructive sleep apnea. It is the firsttreatment choice and the most widely used. CPAP:   Helps you have more normal sleep, so you feel less sleepy and more alert during the daytime.  May help keep heart failure or other heart problems from getting worse.  May help lower your blood pressure. If you have a bed partner, they may also sleep better when you use a CPAP. That's because you aren't snoring or restless. Your doctor may suggest CPAP if you have:   Moderate to severe sleep apnea.  Sleep apnea and coronary artery disease (CAD).  Sleep apnea and heart failure. What are the side effects? Some people who use CPAP have:   A dry or stuffy nose and a sore throat.  Irritated skin on the face.  Bloating. How can you care for yourself? If using CPAP is not comfortable, or if you have certain side effects, workwith your doctor to fix them. Here are some things you can try:   Be sure the mask, nasal mask, or nasal pillow fits well.  See if your doctor can adjust the pressure of your CPAP.  If your nose or mouth is dry, set the machine to deliver warmer or wetter air. Or try using a humidifier.  If your nose is runny or stuffy, talk to your doctor about using a decongestant medicine or steroid nasal spray. Be safe with medicines. Read and follow all instructions on the label. Do not use the medicine longer than the label says.  Your doctor may also help you with problems like swallowing air, bloating, or claustrophobia. Talk to your doctor if you're still having problems. If these things don'thelp, you might try a different type of machine. Where can you learn more? Go to [email or web address] and sign in to your [employer] account. Enter O071 in the [employer] box to learn more about \"Learning About CPAP for Sleep Apnea. \"     If you do not have an account, please click on the \"Sign Up Now\" link. Current as of: July 6, 2021               Content Version: 13.2  © 2006-2022 Healthwise, Incorporated. Care instructions adapted under license by [employer]. If you have questions about a medical condition or this instruction, always ask your healthcare professional. [address] any warranty or liability for your use of this information.

## 2022-05-05 NOTE — PROGRESS NOTES
Heather Khan         : 1950  [] [address]     [] [address]      [] Nirmal     []Flash    [] Kevan Peres  [x] [facility]   [] Other:  Diagnosis: [x] STEPHEN (G47.33) [] CSA (G47.31) [] Apnea (G47.30)   Length of Need: [] 12 Months [x] 99 Months [] Other:    Machine (LIZZETH!): [] Respironics Dream Station      Auto [x] ResMed AirSense     Auto [] Other:     [x]  CPAP () [] Bilevel ()   Mode: [x] Auto [] Spontaneous    Mode: [] Auto [] Spontaneous           New travel machine at 8 cm                 Comfort Settings:   - Ramp Pressure: 5 cmH2O                                        - Ramp time: 15 min                                     -  Flex/EPR - 3 full time                                    - For ResMed Bilevel (TiMax-4 sec   TiMin- 0.2 sec)     Humidifier: [x] Heated ()        [x] Water chamber replacement ()/ 1 per 6 months        Mask:  Please always start with the mask the patient used during the titraion   [x] Nasal () /1 per 3 months [x] Full Face () /1 per 3 months   [x] Patient choice -Size and fit mask [x] Patient Choice - Size and fit mask   [] Dispense:  [] Dispense:   Try SimPlus   [x] Headgear () / 1 per 3 months [x] Headgear () / 1 per 3 months   [x] Replacement Nasal Cushion ()/2 per month [x] Interface Replacement ()/1 per month   [x] Replacement Nasal Pillows ()/2 per month         Tubing: [x] Heated ()/1 per 3 months    [] Standard ()/1 per 3 months [] Other:           Filters: [x] Non-disposable ()/1 per 6 months     [x] Ultra-Fine, Disposable ()/2 per month        Miscellaneous: [x] Chin Strap ()/ 1 per 6 months [] O2 bleed-in:       LPM   [] Oximetry on CPAP/Bilevel []  Other:          Start Order Date: 22    MEDICAL JUSTIFICATION:  I, the undersigned, certify that the above prescribed supplies are medically necessary for this patients wellbeing.   In my opinion, the supplies are both reasonable and necessary in reference to accepted standards of medicalpractice in treatment of this patients condition.     Cihdi Sheets MD      NPI: [id number]       Order Signed Date: 05/05/22    Electronically signed by Chidi Sheets MD on 5/5/2022 at 2:56 PM

## 2022-05-12 RX ORDER — FEBUXOSTAT 40 MG/1
TABLET, FILM COATED ORAL
Qty: 90 TABLET | Refills: 1 | Status: SHIPPED | OUTPATIENT
Start: 2022-05-12

## 2022-06-02 RX ORDER — TAMSULOSIN HYDROCHLORIDE 0.4 MG/1
CAPSULE ORAL
Qty: 90 CAPSULE | Refills: 1 | Status: SHIPPED | OUTPATIENT
Start: 2022-06-02

## 2022-06-16 DIAGNOSIS — I10 ESSENTIAL HYPERTENSION: ICD-10-CM

## 2022-06-16 RX ORDER — LOSARTAN POTASSIUM AND HYDROCHLOROTHIAZIDE 12.5; 5 MG/1; MG/1
TABLET ORAL
Qty: 90 TABLET | Refills: 1 | Status: SHIPPED | OUTPATIENT
Start: 2022-06-16

## 2022-06-21 ENCOUNTER — OFFICE VISIT (OUTPATIENT)
Dept: FAMILY MEDICINE CLINIC | Age: 72
End: 2022-06-21
Payer: MEDICARE

## 2022-06-21 VITALS
WEIGHT: 219 LBS | OXYGEN SATURATION: 96 % | TEMPERATURE: 98.2 F | HEIGHT: 66 IN | SYSTOLIC BLOOD PRESSURE: 114 MMHG | RESPIRATION RATE: 16 BRPM | BODY MASS INDEX: 35.2 KG/M2 | HEART RATE: 80 BPM | DIASTOLIC BLOOD PRESSURE: 58 MMHG

## 2022-06-21 DIAGNOSIS — M77.8 RIGHT SHOULDER TENDONITIS: Primary | ICD-10-CM

## 2022-06-21 DIAGNOSIS — M65.331 TRIGGER FINGER, RIGHT MIDDLE FINGER: ICD-10-CM

## 2022-06-21 PROCEDURE — 4004F PT TOBACCO SCREEN RCVD TLK: CPT | Performed by: NURSE PRACTITIONER

## 2022-06-21 PROCEDURE — 3017F COLORECTAL CA SCREEN DOC REV: CPT | Performed by: NURSE PRACTITIONER

## 2022-06-21 PROCEDURE — 99213 OFFICE O/P EST LOW 20 MIN: CPT | Performed by: NURSE PRACTITIONER

## 2022-06-21 PROCEDURE — G8427 DOCREV CUR MEDS BY ELIG CLIN: HCPCS | Performed by: NURSE PRACTITIONER

## 2022-06-21 PROCEDURE — G8417 CALC BMI ABV UP PARAM F/U: HCPCS | Performed by: NURSE PRACTITIONER

## 2022-06-21 PROCEDURE — 1123F ACP DISCUSS/DSCN MKR DOCD: CPT | Performed by: NURSE PRACTITIONER

## 2022-06-21 ASSESSMENT — PATIENT HEALTH QUESTIONNAIRE - PHQ9
1. LITTLE INTEREST OR PLEASURE IN DOING THINGS: 0
SUM OF ALL RESPONSES TO PHQ QUESTIONS 1-9: 0
SUM OF ALL RESPONSES TO PHQ QUESTIONS 1-9: 0
SUM OF ALL RESPONSES TO PHQ9 QUESTIONS 1 & 2: 0
2. FEELING DOWN, DEPRESSED OR HOPELESS: 0
SUM OF ALL RESPONSES TO PHQ QUESTIONS 1-9: 0
SUM OF ALL RESPONSES TO PHQ QUESTIONS 1-9: 0

## 2022-06-21 ASSESSMENT — ENCOUNTER SYMPTOMS: ABDOMINAL PAIN: 0

## 2022-06-21 NOTE — PROGRESS NOTES
6/21/2022    This is a 80 y.o. male   Chief Complaint   Patient presents with    Shoulder Pain     rt shoulder pain x6 months. has gotten worse over the last month. it's fine during the day. flares up over night. pain radiates into arm  and into rt hand. 3rd finger locks up by morning. no known injury. Madisyn Bañuelos \[facility] [place] [facility]\""  Patient reports that he is having right shoulder, bicep, and middle finger with pain that is at night. During the day not having any issues. Denies neck pain. If he keeps hand flat will not have finger locking up. During the day he will have pain in shoulder with full shoulder ROM. Symptoms have been for at least 6 months. Symptoms are getting worse. Denies trama to area. Taking 2 tylenol every other night with minimal improvement in symptoms. Takes naproxen 375 mg BID for the past month without improvement in symptoms. Has not seen ortho.       Patient Active Problem List   Diagnosis    Essential hypertension    Mixed hyperlipidemia    Lymphedema of genitalia    Benign prostatic hyperplasia with urinary frequency    Hidradenitis    Tobacco use    History of colonoscopy with polypectomy - 2018, repeat 2023    Prediabetes    STEPHEN on CPAP    Class 2 severe obesity due to excess calories with serious comorbidity and body mass index (BMI) of 36.0 to 36.9 in adult [facility])    Pain of finger of right hand    History of gout - on Uloric, monitor LFTs    Acute on chronic respiratory failure with hypoxia (HCC)    Tobacco dependence    COPD exacerbation (HCC)          Current Outpatient Medications   Medication Sig Dispense Refill    losartan-hydroCHLOROthiazide (HYZAAR) 50-12.5 MG per tablet TAKE ONE TABLET BY MOUTH DAILY (Patient taking differently: Take 0.5 tablets by mouth daily ) 90 tablet 1    tamsulosin (FLOMAX) 0.4 mg capsule TAKE ONE CAPSULE BY MOUTH DAILY 90 capsule 1    febuxostat (ULORIC) 40 MG TABS tablet TAKE ONE TABLET BY MOUTH DAILY 90 tablet 1    lovastatin (ALTOPREV) 40 MG extended release tablet Take 40 mg by mouth nightly      naproxen (NAPROSYN) 375 MG tablet TAKE ONE TABLET BY MOUTH TWICE A DAY PRN GOUT 60 tablet 1    fluticasone-umeclidin-vilant (TRELEGY ELLIPTA) 100-62.5-25 MCG/INH AEPB Inhale 1 puff into the lungs daily 28 each 5     No current facility-administered medications for this visit. Allergies   Allergen Reactions    Allopurinol Rash       Review of Systems   Constitutional: Negative for activity change and fever. Cardiovascular: Negative for chest pain. Gastrointestinal: Negative for abdominal pain. Musculoskeletal: Positive for arthralgias and myalgias. Neurological: Negative for weakness and numbness. Vitals:    06/21/22 1344   BP: (!) 114/58   Site: Left Upper Arm   Position: Sitting   Cuff Size: Large Adult   Pulse: 80   Resp: 16   Temp: 98.2 °F (36.8 °C)   TempSrc: Oral   SpO2: 96%   Weight: 219 lb (99.3 kg)   Height: 5' 6\" (1.676 m)       Body mass index is 35.35 kg/m². Wt Readings from Last 3 Encounters:   06/21/22 219 lb (99.3 kg)   05/05/22 218 lb 9.6 oz (99.2 kg)   04/14/22 217 lb 9.6 oz (98.7 kg)       BP Readings from Last 3 Encounters:   06/21/22 (!) 114/58   05/05/22 110/70   04/14/22 125/70       Physical Exam  Vitals and nursing note reviewed. Constitutional:       Appearance: He is well-developed. HENT:      Head: Normocephalic and atraumatic. Pulmonary:      Effort: Pulmonary effort is normal.   Musculoskeletal:      Right shoulder: Tenderness present. No swelling. Decreased range of motion. Right elbow: No swelling. Normal range of motion. No tenderness. Right wrist: No swelling or tenderness. Normal range of motion. Cervical back: No swelling or tenderness. Normal range of motion. Comments: Right shoulder tenderness in bicep area. Has increased pain with abduction and internal and external rotation. Skin:     General: Skin is warm and dry.    Neurological:      Mental Status: He is alert and oriented to person, place, and time. Psychiatric:         Behavior: Behavior normal.         Thought Content: Thought content normal.         Judgment: Judgment normal.         Assessmentand Plan  Temi Walton was seen today for shoulder pain. Diagnoses and all orders for this visit:    Right shoulder tendonitis  -     Rosita Tran MD, Orthopedic Surgery, [facility]  Refer to ortho for evaluation and possible cortisone injection. Refer to PT to help with ROM and decrease pain   Advised to ice area PRN  Can take tylenol 1000 mg qhs to help with pain. Has not seen improvement with daily NSAID use, so can stop. Trigger finger, right middle finger  He would like his shoulder evaluated first. Once this is better, can give referral to ortho Dr. Sis Kirk for evaluation and treatment. Return in about 4 weeks (around 7/19/2022), or if symptoms worsen or fail to improve, for chronic conditions, with Dr. Florida Mercado.

## 2022-06-27 ENCOUNTER — OFFICE VISIT (OUTPATIENT)
Dept: ORTHOPEDIC SURGERY | Age: 72
End: 2022-06-27
Payer: MEDICARE

## 2022-06-27 VITALS — BODY MASS INDEX: 35.2 KG/M2 | WEIGHT: 219 LBS | HEIGHT: 66 IN

## 2022-06-27 DIAGNOSIS — M75.81 ROTATOR CUFF TENDINITIS, RIGHT: ICD-10-CM

## 2022-06-27 DIAGNOSIS — M25.511 RIGHT SHOULDER PAIN, UNSPECIFIED CHRONICITY: Primary | ICD-10-CM

## 2022-06-27 DIAGNOSIS — M65.331 TRIGGER MIDDLE FINGER OF RIGHT HAND: ICD-10-CM

## 2022-06-27 PROCEDURE — 4004F PT TOBACCO SCREEN RCVD TLK: CPT | Performed by: PHYSICIAN ASSISTANT

## 2022-06-27 PROCEDURE — G8427 DOCREV CUR MEDS BY ELIG CLIN: HCPCS | Performed by: PHYSICIAN ASSISTANT

## 2022-06-27 PROCEDURE — 1123F ACP DISCUSS/DSCN MKR DOCD: CPT | Performed by: PHYSICIAN ASSISTANT

## 2022-06-27 PROCEDURE — 3017F COLORECTAL CA SCREEN DOC REV: CPT | Performed by: PHYSICIAN ASSISTANT

## 2022-06-27 PROCEDURE — G8417 CALC BMI ABV UP PARAM F/U: HCPCS | Performed by: PHYSICIAN ASSISTANT

## 2022-06-27 PROCEDURE — 99203 OFFICE O/P NEW LOW 30 MIN: CPT | Performed by: PHYSICIAN ASSISTANT

## 2022-06-27 PROCEDURE — 20610 DRAIN/INJ JOINT/BURSA W/O US: CPT | Performed by: PHYSICIAN ASSISTANT

## 2022-06-27 PROCEDURE — 20600 DRAIN/INJ JOINT/BURSA W/O US: CPT | Performed by: PHYSICIAN ASSISTANT

## 2022-06-27 RX ORDER — TRIAMCINOLONE ACETONIDE 40 MG/ML
40 INJECTION, SUSPENSION INTRA-ARTICULAR; INTRAMUSCULAR ONCE
Status: COMPLETED | OUTPATIENT
Start: 2022-06-27 | End: 2022-06-27

## 2022-06-27 RX ORDER — LIDOCAINE HYDROCHLORIDE 10 MG/ML
2 INJECTION, SOLUTION INFILTRATION; PERINEURAL ONCE
Status: COMPLETED | OUTPATIENT
Start: 2022-06-27 | End: 2022-06-27

## 2022-06-27 RX ADMIN — TRIAMCINOLONE ACETONIDE 40 MG: 40 INJECTION, SUSPENSION INTRA-ARTICULAR; INTRAMUSCULAR at 15:19

## 2022-06-27 RX ADMIN — LIDOCAINE HYDROCHLORIDE 2 ML: 10 INJECTION, SOLUTION INFILTRATION; PERINEURAL at 15:19

## 2022-06-27 RX ADMIN — LIDOCAINE HYDROCHLORIDE 2 ML: 10 INJECTION, SOLUTION INFILTRATION; PERINEURAL at 15:18

## 2022-06-27 RX ADMIN — TRIAMCINOLONE ACETONIDE 40 MG: 40 INJECTION, SUSPENSION INTRA-ARTICULAR; INTRAMUSCULAR at 15:20

## 2022-06-28 ENCOUNTER — TELEPHONE (OUTPATIENT)
Dept: PULMONOLOGY | Age: 72
End: 2022-06-28

## 2022-06-28 ENCOUNTER — TELEPHONE (OUTPATIENT)
Dept: FAMILY MEDICINE CLINIC | Age: 72
End: 2022-06-28

## 2022-06-28 NOTE — TELEPHONE ENCOUNTER
Dermatologist has prescribed Humira. Dr Huerta would like patient to get Dr. Eric Valerio approval prior to starting. Due to lung condition and possible side effects.

## 2022-06-28 NOTE — TELEPHONE ENCOUNTER
Pt saw the dermatologist and he was put on Humara he has not started it yet he wants Dr Huerta opinion before taking this due to other medical conditions he has he is concerned what this can cause and side effects this is a skin condition called hidradenitis   Pt stated he has been through antibiotics that have not been effective so now it has come to this point   Dr Deniz Rojas is the dermatologist who Dr Huerta recommended       Please advise

## 2022-06-29 NOTE — TELEPHONE ENCOUNTER
LM for patient that he has the ok to do this per Dr. Kaden Wilson information. If he needs this faxed was instructed to call with phone number and or fax number.

## 2022-07-02 NOTE — PROGRESS NOTES
This dictation was done with Dragon dictation and may contain mechanical errors related to translation. I have today reviewed with Sam Sparks the clinically relevant, past medical history, medications, allergies, family history, social history, and Review Of Systems form the patients most recent history form & I have documented any details relevant to today's presenting complaints in my history below. Mr. Radha Stoner's self-reported past medical history, medications, allergies, family history, social history, and Review Of Systems form has been scanned into the chart under the \"Media\" tab. Subjective:  Sam Sparks is a 80 y.o. who is here as a new patient from [facility],  referred for pain in his right shoulder as well as a trigger right middle finger. He is a retired he started to have pain about 6 months ago. Only radiates from the right arm down the front and he has catching or locking in his right middle finger x-rays were taken in the office today of his right shoulder. I saw x-rays taken in 2020 of his right hand.       Patient Active Problem List   Diagnosis    Essential hypertension    Mixed hyperlipidemia    Lymphedema of genitalia    Benign prostatic hyperplasia with urinary frequency    Hidradenitis    Tobacco use    History of colonoscopy with polypectomy - 2018, repeat 2023    Prediabetes    STEPHEN on CPAP    Class 2 severe obesity due to excess calories with serious comorbidity and body mass index (BMI) of 36.0 to 36.9 in adult [facility])    Pain of finger of right hand    History of gout - on Uloric, monitor LFTs    Acute on chronic respiratory failure with hypoxia (HCC)    Tobacco dependence    COPD exacerbation ([facility] [address]           Current Outpatient Medications on File Prior to Visit   Medication Sig Dispense Refill    losartan-hydroCHLOROthiazide (HYZAAR) 50-12.5 MG per tablet TAKE ONE TABLET BY MOUTH DAILY (Patient taking differently: Take 0.5 tablets by mouth daily ) 90 tablet 1    tamsulosin (FLOMAX) 0.4 mg capsule TAKE ONE CAPSULE BY MOUTH DAILY 90 capsule 1    febuxostat (ULORIC) 40 MG TABS tablet TAKE ONE TABLET BY MOUTH DAILY 90 tablet 1    lovastatin (ALTOPREV) 40 MG extended release tablet Take 40 mg by mouth nightly      naproxen (NAPROSYN) 375 MG tablet TAKE ONE TABLET BY MOUTH TWICE A DAY PRN GOUT 60 tablet 1    fluticasone-umeclidin-vilant (TRELEGY ELLIPTA) 100-62.5-25 MCG/INH AEPB Inhale 1 puff into the lungs daily 28 each 5     No current facility-administered medications on file prior to visit. Objective:   Height 5' 6\" (1.676 m), weight 219 lb (99.3 kg). On examination is a very pleasant 46-year-old gentleman in no acute distress he is alert and oriented x3 he has some pain with crossover and impingement testing. He is little bit of weakness to supraspinatus strength testing. He is negative for sulcus sign negative for relocation test.  He has good symmetric motion to the neck with a negative Spurling's test.  He has good  strength good wrist extension strength but he has some triggering and palpable tenderness over the flexor pulley of the right middle finger consistent with trigger finger. He is got good capillary refill no neurological deficits to the right hand. Neuro exam grossly intact both lower extremities. Intact sensation to light touch. Motor exam 4+ to 5/5 in all major motor groups. Negative Torres's sign. Skin is warm, dry and intact with out erythema or significant increased temperature around the knee joint(s). There are no cutaneous lesions or lymphadenopathy present. X-RAYS:  X-rays taken the office today show mild acromial impingement no significant superior migration of her humeral head changes no fractures or other bone abnormalities were noted. Essentially normal x-ray of the right shoulder.       Assessment:  Right shoulder rotator cuff tendinitis and impingement syndrome, right middle finger trigger finger    Plan:  During today's visit, there was approximately 30 to minutes of face-to-face discussion in regards to the patient's current condition and treatment options. More than 50 % of the time was counseling and coordination of care as indicated above. At this point we talked about short and long-term expectations he is tried anti-inflammatories exercises he continues have soreness and pain  Is because he is got acromial impingement syndrome. With his consent he was injected with 1 cc Kenalog and 2 cc of 1% lidocaine this was done in the appropriate sterile fashion into the right shoulder subacromial space. He tolerated well we went through a band exercise program for his shoulder. For the finger if we did do an injection of 1 cc of Kenalog 1 cc of 1% lidocaine. This was injected in the flexor space at the base of the right middle finger.   He tolerated well we talked about stretching his forearm icing these areas and we will see him in follow-up in 4 weeks if he does not get significant improvement I would consider getting an MRI of his shoulder      PROCEDURE NOTE:   Cortisone for shoulder and the finger      They will schedule a follow up in 4 to 5 weeks

## 2022-08-25 RX ORDER — FLUTICASONE FUROATE, UMECLIDINIUM BROMIDE AND VILANTEROL TRIFENATATE 100; 62.5; 25 UG/1; UG/1; UG/1
1 POWDER RESPIRATORY (INHALATION) DAILY
Qty: 1 EACH | Refills: 5 | Status: SHIPPED | OUTPATIENT
Start: 2022-08-25

## 2022-10-03 ENCOUNTER — HOSPITAL ENCOUNTER (OUTPATIENT)
Dept: PULMONOLOGY | Age: 72
Discharge: HOME | End: 2022-10-03
Payer: MEDICARE

## 2022-10-03 LAB
DLCO %PRED: 75 %
DLCO PRED: NORMAL
DLCO/VA %PRED: 78 %
DLCO/VA PRED: NORMAL
DLCO/VA: 3.39 ML/MIN/MMHG
DLCO: 19.78 ML/MIN/MMHG
EXPIRATORY TIME-POST: NORMAL
EXPIRATORY TIME: NORMAL
FEF 25-75% %CHNG: NORMAL
FEF 25-75% %PRED-POST: NORMAL
FEF 25-75% %PRED-PRE: NORMAL
FEF 25-75% PRED: NORMAL
FEF 25-75%-POST: NORMAL
FEF 25-75%-PRE: NORMAL
FEV1 %PRED-POST: 58 %
FEV1 %PRED-PRE: 54 %
FEV1 PRED: NORMAL
FEV1-POST: NORMAL
FEV1-PRE: NORMAL
FEV1/FVC %PRED-POST: NORMAL
FEV1/FVC %PRED-PRE: NORMAL
FEV1/FVC PRED: NORMAL
FEV1/FVC-POST: 53 %
FEV1/FVC-PRE: 54 %
FVC %PRED-POST: NORMAL
FVC %PRED-PRE: NORMAL
FVC PRED: NORMAL
FVC-POST: NORMAL
FVC-PRE: NORMAL
GAW %PRED: NORMAL
GAW PRED: NORMAL
GAW: NORMAL
IC %PRED: NORMAL
IC PRED: NORMAL
IC: NORMAL
MEP: NORMAL
MIP: NORMAL
MVV %PRED-PRE: NORMAL
MVV PRED: NORMAL
MVV-PRE: NORMAL
PEF %PRED-POST: NORMAL
PEF %PRED-PRE: NORMAL
PEF PRED: NORMAL
PEF%CHNG: NORMAL
PEF-POST: NORMAL
PEF-PRE: NORMAL
RAW %PRED: NORMAL
RAW PRED: NORMAL
RAW: NORMAL
RV %PRED: NORMAL
RV PRED: NORMAL
RV: NORMAL
SVC %PRED: NORMAL
SVC PRED: NORMAL
SVC: NORMAL
TLC %PRED: 103 %
TLC PRED: NORMAL
TLC: NORMAL
VA %PRED: 95 %
VA PRED: NORMAL
VA: 5.84 L
VTG %PRED: NORMAL
VTG PRED: NORMAL
VTG: NORMAL

## 2022-10-03 PROCEDURE — 94729 DIFFUSING CAPACITY: CPT

## 2022-10-03 PROCEDURE — 94729 DIFFUSING CAPACITY: CPT | Performed by: INTERNAL MEDICINE

## 2022-10-03 PROCEDURE — 94726 PLETHYSMOGRAPHY LUNG VOLUMES: CPT | Performed by: INTERNAL MEDICINE

## 2022-10-03 PROCEDURE — 94640 AIRWAY INHALATION TREATMENT: CPT

## 2022-10-03 PROCEDURE — 6370000000 HC RX 637 (ALT 250 FOR IP): Performed by: INTERNAL MEDICINE

## 2022-10-03 PROCEDURE — 94060 EVALUATION OF WHEEZING: CPT

## 2022-10-03 PROCEDURE — 94060 EVALUATION OF WHEEZING: CPT | Performed by: INTERNAL MEDICINE

## 2022-10-03 PROCEDURE — 94726 PLETHYSMOGRAPHY LUNG VOLUMES: CPT

## 2022-10-03 PROCEDURE — 94664 DEMO&/EVAL PT USE INHALER: CPT

## 2022-10-03 RX ORDER — ALBUTEROL SULFATE 90 UG/1
4 AEROSOL, METERED RESPIRATORY (INHALATION) ONCE
Status: COMPLETED | OUTPATIENT
Start: 2022-10-03 | End: 2022-10-03

## 2022-10-03 RX ADMIN — ALBUTEROL SULFATE 4 PUFF: 90 AEROSOL, METERED RESPIRATORY (INHALATION) at 13:31

## 2022-10-03 ASSESSMENT — PULMONARY FUNCTION TESTS
FEV1/FVC_POST: 53
FEV1_PERCENT_PREDICTED_POST: 58
FEV1_PERCENT_PREDICTED_PRE: 54
FEV1/FVC_PRE: 54

## 2022-10-03 NOTE — PROCEDURES
spirometry was acceptable and reproducible by ATS standards      Spirometry/Flow volume loop: There is moderate airflow obstruction with no statistically significant postbronchodilator response. Lung volumes: There is evidence of air trapping, additionally there is reduction in ERV a pattern consistent with extraparenchymal restriction likely related to patient's body habitus. Diffusing capacity:  Mild reduction in DLCO at 75% predicted. Impression:  There is moderate airflow obstruction with evidence of air trapping and no statistically significant postbronchodilator response. FEV1 %Pred-Post   Date Value Ref Range Status   10/03/2022 58 % Final     FEV1/FVC-Post   Date Value Ref Range Status   10/03/2022 53 % Final     TLC %Pred   Date Value Ref Range Status   10/03/2022 103 % Final     DLCO/VA %Pred   Date Value Ref Range Status   10/03/2022 78 % Final           OBSTRUCTION % Predicted FEV1   MILD >70%   MODERATE 60-69%   MODERATELY-SEVERE 50-59%   SEVERE 35-49%   VERY SEVERE <35%         RESTRICTION % Predicted TLC   MILD 66-80%   MODERATE 54-65%   MODERATELY-SEVERE <54%                 DIFFUSION CAPACITY DLCO % Pred   MILD >60% AND < LLN   MODERATE 40-60%   SEVERE <40%       PFT data will be scanned into the media tab under this encounter. Please see the scanned data for numerical values.      Shaquille Berman MD  [facility], Sleep [facility]

## 2022-10-10 DIAGNOSIS — Z87.39 HISTORY OF GOUT: ICD-10-CM

## 2022-10-10 RX ORDER — NAPROXEN 375 MG/1
TABLET ORAL
Qty: 60 TABLET | Refills: 1 | Status: SHIPPED | OUTPATIENT
Start: 2022-10-10

## 2022-10-20 ENCOUNTER — OFFICE VISIT (OUTPATIENT)
Dept: PULMONOLOGY | Age: 72
End: 2022-10-20
Payer: MEDICARE

## 2022-10-20 VITALS
DIASTOLIC BLOOD PRESSURE: 68 MMHG | SYSTOLIC BLOOD PRESSURE: 130 MMHG | TEMPERATURE: 97.9 F | HEART RATE: 81 BPM | BODY MASS INDEX: 33.8 KG/M2 | WEIGHT: 223 LBS | RESPIRATION RATE: 18 BRPM | OXYGEN SATURATION: 100 % | HEIGHT: 68 IN

## 2022-10-20 DIAGNOSIS — J41.1 MUCOPURULENT CHRONIC BRONCHITIS (HCC): Primary | ICD-10-CM

## 2022-10-20 DIAGNOSIS — Z87.891 PERSONAL HISTORY OF TOBACCO USE: ICD-10-CM

## 2022-10-20 DIAGNOSIS — Z72.0 TOBACCO USE: ICD-10-CM

## 2022-10-20 DIAGNOSIS — G47.33 OSA ON CPAP: ICD-10-CM

## 2022-10-20 DIAGNOSIS — Z99.89 OSA ON CPAP: ICD-10-CM

## 2022-10-20 PROCEDURE — 99214 OFFICE O/P EST MOD 30 MIN: CPT | Performed by: INTERNAL MEDICINE

## 2022-10-20 PROCEDURE — 4004F PT TOBACCO SCREEN RCVD TLK: CPT | Performed by: INTERNAL MEDICINE

## 2022-10-20 PROCEDURE — 1123F ACP DISCUSS/DSCN MKR DOCD: CPT | Performed by: INTERNAL MEDICINE

## 2022-10-20 PROCEDURE — G8484 FLU IMMUNIZE NO ADMIN: HCPCS | Performed by: INTERNAL MEDICINE

## 2022-10-20 PROCEDURE — G0296 VISIT TO DETERM LDCT ELIG: HCPCS | Performed by: INTERNAL MEDICINE

## 2022-10-20 PROCEDURE — 3023F SPIROM DOC REV: CPT | Performed by: INTERNAL MEDICINE

## 2022-10-20 PROCEDURE — G8417 CALC BMI ABV UP PARAM F/U: HCPCS | Performed by: INTERNAL MEDICINE

## 2022-10-20 PROCEDURE — G8427 DOCREV CUR MEDS BY ELIG CLIN: HCPCS | Performed by: INTERNAL MEDICINE

## 2022-10-20 PROCEDURE — 3017F COLORECTAL CA SCREEN DOC REV: CPT | Performed by: INTERNAL MEDICINE

## 2022-10-20 NOTE — PATIENT INSTRUCTIONS
Advised to quit smoking  Yearly lung cancer screening  Follow up in 6 months   What is lung cancer screening? Lung cancer screening is a way in which doctors check the lungs for early signs of cancer in people who have no symptoms of lung cancer. A low-dose CT scan uses much less radiation than a normal CT scan and shows a more detailed image of the lungs than a standard X-ray. The goal of lung cancer screening is to find cancer early, before it has a chance to grow, spread, or cause problems. One large study found that smokers who were screened with low-dose CT scans were less likely to die of lung cancer than those who were screened with standard X-ray. Below is a summary of the things you need to know regarding screening for lung cancer with low-dose computed tomography (LDCT). This is a screening program that involves routine annual screening with LDCT studies of the lung. The LDCTs are done using low-dose radiation that is not thought to increase your cancer risk. If you have other serious medical conditions (other cancers, congestive heart failure) that limit your life expectancy to less than 10 years, you should not undergo lung cancer screening with LDCT. The chance is 20%-60% that the LDCT result will show abnormalities. This would require additional testing which could include repeat imaging or even invasive procedures. Most (about 95%) of \"abnormal\" LDCT results are false in the sense that no lung cancer is ultimately found. Additionally, some (about 10%) of the cancers found would not affect your life expectancy, even if undetected and untreated. If you are still smoking, the single most important thing that you can do to reduce your risk of dying of lung cancer is to quit. For this screening to be covered by Medicare and most other insurers, strict criteria must be met.   If you do not meet these criteria, but still wish to undergo LDCT testing, you will be required to sign a waiver indicating your willingness to pay for the scan.

## 2022-10-20 NOTE — PROGRESS NOTES
Pulmonary Progress note           REASON FOR follow up:   Chief Complaint   Patient presents with    Follow-up    COPD          PCP: Wilmar Hooks MD        Assessment and Plan:   Diagnosis Orders   1. Mucopurulent chronic bronchitis ([facility] [address]        2. STEPHEN on CPAP        3. Tobacco use        4. Personal history of tobacco use  NH VISIT TO DISCUSS LUNG CA SCREEN W LDCT    CT Lung Screen (Annual)          Plan:  Continue Trelegy and as needed Combivent. CT scan lung cancer screening. Will refer to sleep medicine for his obstructive sleep apnea. Strongly advised to quit smoking. HISTORY OF PRESENT ILLNESS: Gigi Robbins is very pleasant 71y.o. year old gentleman with medical history stated below significant for chronic active smoker with more than 30-pack-year history of smoking, clinically severe COPD no PFT on file, was seen recently at [facility] [place] when he was admitted with acute hypoxic respiratory failure secondary to COPD exacerbation, treated with antibiotic and steroid, prior to his discharge home O2 evaluation did not require oxygen, here today for follow-up currently on Trelegy daily and as needed Combivent, has been doing good, he still smokes but is cutting down. He has obstructive sleep apnea on CPAP therapy,    Is retired in the past he worked with [employer]. 10/3/2022:  FEV1 1.45 L 54% predicted ratio 54% with evidence of air trapping no hyperinflation.   DLCO 75% predicte    Past Medical History:   Diagnosis Date    A-fib ([facility] [address]     Arthritis     Chronic kidney disease     COPD (chronic obstructive pulmonary disease) (HCC)     Dermatitis     Diabetes mellitus ([facility] [address]     Dysphagia     GERD (gastroesophageal reflux disease)     Hydradenitis     Hypertension     Lymphedema     Lymphedema     Morbid obesity (HCC)     STEPHEN (obstructive sleep apnea)     Pneumonia     Respiratory failure, chronic ([facility])     Shortness of breath     Unsteady gait     Venous insufficiency        Past Surgical History:   Procedure Laterality Date    BACK SURGERY  2002       family history includes Heart Disease in his mother; Rheum Arthritis in his mother. SOCIAL HISTORY:   reports that he has been smoking cigarettes. He started smoking about 55 years ago. He has a 25.00 pack-year smoking history. He has been exposed to tobacco smoke. He has never used smokeless tobacco.      ALLERGIES:  Patient is allergic to allopurinol. REVIEW OF SYSTEMS:  Constitutional: Negative for fever   HENT: Negative for sore throat  Eyes: Negative for redness   Respiratory: Cough, shortness of breath has improved since his discharge  Cardiovascular: Negative for chest pain  Gastrointestinal: Negative for vomiting, diarrhea   Genitourinary: Negative for hematuria   Musculoskeletal: Negative for arthralgias   Skin: Negative for rash  Neurological: Negative for syncope  Hematological: Negative for adenopathy  Psychiatric/Behavorial: Negative for anxiety    Objective:   PHYSICAL EXAM:  Blood pressure 130/68, pulse 81, temperature 97.9 °F (36.6 °C), resp. rate 18, height 5' 8\" (1.727 m), weight 223 lb (101.2 kg), SpO2 100 %.'  Gen: No distress. Eyes: PERRL. No sclera icterus. No conjunctival injection. ENT: No discharge. Pharynx clear. External appearance of ears and nose normal.  Neck: Trachea midline. No obvious mass. Resp: Severely diminished bilaterally with prolonged expiratory phase. CV: Regular rate. Regular rhythm. No murmur or rub. No edema. GI: Non-tender. Non-distended. No hernia. Skin: Warm, dry, normal texture and turgor. No nodule on exposed extremities. Lymph: No cervical LAD. No supraclavicular LAD. M/S: No cyanosis. No clubbing. No joint deformity. Neuro: Moves all four extremities. Psych: Oriented x 3. No anxiety. Awake. Alert. Intact judgement and insight.     Current Outpatient Medications   Medication Sig Dispense Refill    naproxen (NAPROSYN) 375 MG tablet TAKE ONE TABLET BY MOUTH TWICE A DAY WITH MEALS 60 tablet 1    fluticasone-umeclidin-vilant (TRELEGY ELLIPTA) 100-62.5-25 MCG/INH AEPB Inhale 1 puff into the lungs daily 1 each 5    losartan-hydroCHLOROthiazide (HYZAAR) 50-12.5 MG per tablet TAKE ONE TABLET BY MOUTH DAILY (Patient taking differently: Take 0.5 tablets by mouth daily) 90 tablet 1    tamsulosin (FLOMAX) 0.4 mg capsule TAKE ONE CAPSULE BY MOUTH DAILY 90 capsule 1    febuxostat (ULORIC) 40 MG TABS tablet TAKE ONE TABLET BY MOUTH DAILY 90 tablet 1    lovastatin (ALTOPREV) 40 MG extended release tablet Take 40 mg by mouth nightly       No current facility-administered medications for this visit. Data Reviewed:   CBC and Renal reviewed  Last CBC  Lab Results   Component Value Date/Time    WBC 10.2 05/24/2022 02:18 PM    RBC 4.57 05/24/2022 02:18 PM    HGB 13.0 05/24/2022 02:18 PM    MCV 85.3 05/24/2022 02:18 PM     05/24/2022 02:18 PM     Last Renal  Lab Results   Component Value Date/Time     05/24/2022 02:18 PM    K 4.5 05/24/2022 02:18 PM    K 5.0 02/26/2022 07:44 AM     05/24/2022 02:18 PM    CO2 23 05/24/2022 02:18 PM    CO2 23 05/05/2022 03:29 PM    CO2 21 04/05/2022 01:15 PM    BUN 23 05/24/2022 02:18 PM    CREATININE 1.1 05/24/2022 02:18 PM    GLUCOSE 97 05/24/2022 02:18 PM    CALCIUM 8.9 05/24/2022 02:18 PM         Radiology Review:  Pertinent images / reports were reviewed as a part of this visit. CT Chest w/ contrast: No results found for this or any previous visit. CT Chest w/o contrast: No results found for this or any previous visit. CTPA: Results for orders placed during the hospital encounter of 02/25/22    CT CHEST PULMONARY EMBOLISM W CONTRAST    Narrative  EXAMINATION:  CTA OF THE CHEST 2/25/2022 5:01 pm    TECHNIQUE:  CTA of the chest was performed after the administration of intravenous  contrast.  Multiplanar reformatted images are provided for review. MIP  images are provided for review.  Dose modulation, iterative reconstruction,  and/or weight based adjustment of the mA/kV was utilized to reduce the  radiation dose to as low as reasonably achievable. COMPARISON:  None. HISTORY:  ORDERING SYSTEM PROVIDED HISTORY: Dyspnea; rule out PE secondary to chest  x-ray negative with borderline O2 sat  TECHNOLOGIST PROVIDED HISTORY:  Reason for exam:->Dyspnea; rule out PE secondary to chest x-ray negative with  borderline O2 sat  Decision Support Exception - unselect if not a suspected or confirmed  emergency medical condition->Emergency Medical Condition (MA)  Reason for Exam: Dyspnea; rule out PE secondary to chest x-ray negative with  borderline O2 sat    75 pack-year history of smoking    FINDINGS:  Pulmonary Arteries: Respiratory motion artifact complicates assessment of the  interseptal branches. Distal inter septal emboli not excludable. However,  opacification is adequate. No filling defects in the main or lobar branches  diagnostic of pulmonary emboli. Pulmonary artery is normal in size. Mediastinum: No mediastinal adenopathy, acute aortic abnormality,  cardiomegaly, pericardial effusion or epicardial adenopathy. Mild  calcification of the coronary arteries is demonstrated. Lungs/pleura: Emphysematous changes are present in the lungs without focal  consolidation, effusion or pneumothorax. No significant nodules. Tracheobronchial tree is patent. Upper Abdomen: 18 mm right adrenal mass with Hounsfield numbers most  compatible with adenoma. Similar 12 mm left adrenal mass. Soft Tissues/Bones: Multilevel degenerative changes in the spine. No acute  osseous destructive process or axillary adenopathy. Impression  1. Respiratory motion artifact complicates assessment of distal pulmonary  arterial branches. Inter septal emboli not excluded. No central or lobar  emboli. 2.  Emphysematous changes in the lungs.     3.  Bilateral hypodense adrenal masses likely adenomas requiring no procedures. Over diagnosis risk - 10% to 12% of screen-detected lung cancer cases are over diagnosed--that is, the cancer would not have been detected in the patient's lifetime without the screening. Need for follow up screens annually to continue lung cancer screening effectiveness     Risks associated with radiation from annual LDCT- Radiation exposure is about the same as for a mammogram, which is about 1/3 of the annual background radiation exposure from everyday life. Starting screening at age 54 is not likely to increase cancer risk from radiation exposure. Patients with comorbidities resulting in life expectancy of < 10 years, or that would preclude treatment of an abnormality identified on CT, should not be screened due to lack of benefit.     To obtain maximal benefit from this screening, smoking cessation and long-term abstinence from smoking is critical

## 2022-10-31 RX ORDER — LOVASTATIN 40 MG/1
TABLET ORAL
Qty: 90 TABLET | Refills: 1 | Status: SHIPPED | OUTPATIENT
Start: 2022-10-31

## 2022-11-07 RX ORDER — FEBUXOSTAT 40 MG/1
TABLET, FILM COATED ORAL
Qty: 90 TABLET | Refills: 1 | Status: SHIPPED | OUTPATIENT
Start: 2022-11-07

## 2022-11-07 SDOH — HEALTH STABILITY: PHYSICAL HEALTH: ON AVERAGE, HOW MANY MINUTES DO YOU ENGAGE IN EXERCISE AT THIS LEVEL?: 30 MIN

## 2022-11-07 SDOH — HEALTH STABILITY: PHYSICAL HEALTH: ON AVERAGE, HOW MANY DAYS PER WEEK DO YOU ENGAGE IN MODERATE TO STRENUOUS EXERCISE (LIKE A BRISK WALK)?: 1 DAY

## 2022-11-07 ASSESSMENT — LIFESTYLE VARIABLES
HOW OFTEN DO YOU HAVE A DRINK CONTAINING ALCOHOL: 2-3 TIMES A WEEK
HOW OFTEN DURING THE LAST YEAR HAVE YOU FAILED TO DO WHAT WAS NORMALLY EXPECTED FROM YOU BECAUSE OF DRINKING: NEVER
HOW OFTEN DURING THE LAST YEAR HAVE YOU BEEN UNABLE TO REMEMBER WHAT HAPPENED THE NIGHT BEFORE BECAUSE YOU HAD BEEN DRINKING: 0
HOW OFTEN DURING THE LAST YEAR HAVE YOU HAD A FEELING OF GUILT OR REMORSE AFTER DRINKING: 0
HOW OFTEN DURING THE LAST YEAR HAVE YOU FOUND THAT YOU WERE NOT ABLE TO STOP DRINKING ONCE YOU HAD STARTED: NEVER
HOW OFTEN DURING THE LAST YEAR HAVE YOU NEEDED AN ALCOHOLIC DRINK FIRST THING IN THE MORNING TO GET YOURSELF GOING AFTER A NIGHT OF HEAVY DRINKING: NEVER
HOW OFTEN DURING THE LAST YEAR HAVE YOU FAILED TO DO WHAT WAS NORMALLY EXPECTED FROM YOU BECAUSE OF DRINKING: 0
HOW MANY STANDARD DRINKS CONTAINING ALCOHOL DO YOU HAVE ON A TYPICAL DAY: 2
HOW OFTEN DURING THE LAST YEAR HAVE YOU NEEDED AN ALCOHOLIC DRINK FIRST THING IN THE MORNING TO GET YOURSELF GOING AFTER A NIGHT OF HEAVY DRINKING: 0
HAVE YOU OR SOMEONE ELSE BEEN INJURED AS A RESULT OF YOUR DRINKING: NO
HAS A RELATIVE, FRIEND, DOCTOR, OR ANOTHER HEALTH PROFESSIONAL EXPRESSED CONCERN ABOUT YOUR DRINKING OR SUGGESTED YOU CUT DOWN: 0
HOW OFTEN DO YOU HAVE SIX OR MORE DRINKS ON ONE OCCASION: 2
HOW OFTEN DURING THE LAST YEAR HAVE YOU FOUND THAT YOU WERE NOT ABLE TO STOP DRINKING ONCE YOU HAD STARTED: 0
HAVE YOU OR SOMEONE ELSE BEEN INJURED AS A RESULT OF YOUR DRINKING: 0
HAS A RELATIVE, FRIEND, DOCTOR, OR ANOTHER HEALTH PROFESSIONAL EXPRESSED CONCERN ABOUT YOUR DRINKING OR SUGGESTED YOU CUT DOWN: NO
HOW MANY STANDARD DRINKS CONTAINING ALCOHOL DO YOU HAVE ON A TYPICAL DAY: 3 OR 4
HOW OFTEN DO YOU HAVE A DRINK CONTAINING ALCOHOL: 4
HOW OFTEN DURING THE LAST YEAR HAVE YOU BEEN UNABLE TO REMEMBER WHAT HAPPENED THE NIGHT BEFORE BECAUSE YOU HAD BEEN DRINKING: NEVER
HOW OFTEN DURING THE LAST YEAR HAVE YOU HAD A FEELING OF GUILT OR REMORSE AFTER DRINKING: NEVER

## 2022-11-07 ASSESSMENT — PATIENT HEALTH QUESTIONNAIRE - PHQ9
2. FEELING DOWN, DEPRESSED OR HOPELESS: 0
SUM OF ALL RESPONSES TO PHQ QUESTIONS 1-9: 0
1. LITTLE INTEREST OR PLEASURE IN DOING THINGS: 0
SUM OF ALL RESPONSES TO PHQ QUESTIONS 1-9: 0
SUM OF ALL RESPONSES TO PHQ9 QUESTIONS 1 & 2: 0

## 2022-11-08 ENCOUNTER — TELEMEDICINE (OUTPATIENT)
Dept: FAMILY MEDICINE CLINIC | Age: 72
End: 2022-11-08
Payer: MEDICARE

## 2022-11-08 DIAGNOSIS — R35.0 BENIGN PROSTATIC HYPERPLASIA WITH URINARY FREQUENCY: ICD-10-CM

## 2022-11-08 DIAGNOSIS — I10 ESSENTIAL HYPERTENSION: ICD-10-CM

## 2022-11-08 DIAGNOSIS — E78.2 MIXED HYPERLIPIDEMIA: ICD-10-CM

## 2022-11-08 DIAGNOSIS — Z00.00 LABORATORY EXAM ORDERED AS PART OF ROUTINE GENERAL MEDICAL EXAMINATION: ICD-10-CM

## 2022-11-08 DIAGNOSIS — Z00.00 MEDICARE ANNUAL WELLNESS VISIT, SUBSEQUENT: Primary | ICD-10-CM

## 2022-11-08 DIAGNOSIS — Z72.0 TOBACCO USE: ICD-10-CM

## 2022-11-08 DIAGNOSIS — N40.1 BENIGN PROSTATIC HYPERPLASIA WITH URINARY FREQUENCY: ICD-10-CM

## 2022-11-08 PROBLEM — J96.21 ACUTE ON CHRONIC RESPIRATORY FAILURE WITH HYPOXIA (HCC): Status: RESOLVED | Noted: 2022-02-25 | Resolved: 2022-11-08

## 2022-11-08 PROCEDURE — G0439 PPPS, SUBSEQ VISIT: HCPCS | Performed by: FAMILY MEDICINE

## 2022-11-08 PROCEDURE — 1123F ACP DISCUSS/DSCN MKR DOCD: CPT | Performed by: FAMILY MEDICINE

## 2022-11-08 PROCEDURE — 3017F COLORECTAL CA SCREEN DOC REV: CPT | Performed by: FAMILY MEDICINE

## 2022-11-08 RX ORDER — ADALIMUMAB 80MG/0.8ML
KIT SUBCUTANEOUS
COMMUNITY
Start: 2022-09-26

## 2022-11-08 SDOH — ECONOMIC STABILITY: FOOD INSECURITY: WITHIN THE PAST 12 MONTHS, THE FOOD YOU BOUGHT JUST DIDN'T LAST AND YOU DIDN'T HAVE MONEY TO GET MORE.: NEVER TRUE

## 2022-11-08 SDOH — ECONOMIC STABILITY: FOOD INSECURITY: WITHIN THE PAST 12 MONTHS, YOU WORRIED THAT YOUR FOOD WOULD RUN OUT BEFORE YOU GOT MONEY TO BUY MORE.: NEVER TRUE

## 2022-11-08 ASSESSMENT — SOCIAL DETERMINANTS OF HEALTH (SDOH): HOW HARD IS IT FOR YOU TO PAY FOR THE VERY BASICS LIKE FOOD, HOUSING, MEDICAL CARE, AND HEATING?: NOT HARD AT ALL

## 2022-11-08 NOTE — PROGRESS NOTES
Medicare Annual Wellness Visit    Jasiel Teran is here for Medicare AWV (Pt is having an issue with urination. He starts out strong and then dribbles )    Assessment & Plan   Medicare annual wellness visit, subsequent  Essential hypertension  Tobacco use  Benign prostatic hyperplasia with urinary frequency    Recommendations for Preventive Services Due: see orders and patient instructions/AVS.  Recommended screening schedule for the next 5-10 years is provided to the patient in written form: see Patient Instructions/AVS.    Discussed chronic conditions. Doing well on current meds. BP well controlled. Continue flomax for BPH    On Humira with his Dermatologist    Following with Pulm and is getting a CT lung screen    Encouraged Shingrix vaccine    Discussed NSAID use, monitoring renal function for GI symptoms     Return for Medicare Annual Wellness Visit in 1 year. Subjective       Patient's complete Health Risk Assessment and screening values have been reviewed and are found in Flowsheets. The following problems were reviewed today and where indicated follow up appointments were made and/or referrals ordered.     BP Readings from Last 3 Encounters:   10/20/22 130/68   06/21/22 (!) 114/58   05/05/22 110/70     Positive Risk Factor Screenings with Interventions:       Tobacco Use:  Tobacco Use: High Risk    Smoking Tobacco Use: Every Day    Smokeless Tobacco Use: Never    Passive Exposure: Past     E-cigarette/Vaping       Questions Responses    E-cigarette/Vaping Use Never User    Start Date     Passive Exposure     Quit Date     Counseling Given     Comments           Substance Use - Tobacco Interventions:  patient agrees to try the following tobacco cessation strategies:  wants to work on gradual reduction of tobacco use    Alcohol Screening:  Alcohol Use: Heavy Drinker    Frequency of Alcohol Consumption: 2-3 times a week    Average Number of Drinks: 3 or 4    Frequency of Binge Drinking: Less than monthly     AUDIT-C Score: 5  AUDIT Total Score: 5  An AUDIT-C score of 3-7 indicates potential alcohol risk. An AUDIT Total score of 8 or more is associated with harmful or hazardous drinking. A score of 13 or more in women, and 15 or more in men, is likely to indicate alcohol dependence. Substance Use - Alcohol Interventions:  Reports has cut way back on alcohol intake. Will go weeks sometimes without a drink        General Health and ACP:  General  In general, how would you say your health is?: Very Good  In the past 7 days, have you experienced any of the following: New or Increased Pain, New or Increased Fatigue, Loneliness, Social Isolation, Stress or Anger?: No  Do you get the social and emotional support that you need?: Yes  Do you have a Living Will?: (!) No    Advance Directives       Power of [address] [place] [address] Will ACP-Advance Directive ACP-Power of     Not on File Not on File Not on File Not on File        General Health Risk Interventions:  No Living Will: will give papers at upcoming appt to fill out    Health Habits/Nutrition:  Physical Activity: Insufficiently Active    Days of Exercise per Week: 1 day    Minutes of Exercise per Session: 30 min     Have you lost any weight without trying in the past 3 months?: No     Have you seen the dentist within the past year?: (!) No  Health Habits/Nutrition Interventions:  Dental exam overdue:  patient encouraged to make appointment with his/her dentist             Objective      Patient-Reported Vitals  No data recorded          Allergies   Allergen Reactions    Allopurinol Rash     Prior to Visit Medications    Medication Sig Taking?  Authorizing Provider   Adalimumab (HUMIRA PEN) 80 MG/0.8ML PNKT  Yes Historical Provider, MD   febuxostat (ULORIC) 40 MG TABS tablet TAKE ONE TABLET BY MOUTH DAILY Yes Bassem Huerta MD   lovastatin (MEVACOR) 40 MG tablet TAKE ONE TABLET BY MOUTH DAILY Yes Bassem Huerta MD   naproxen (NAPROSYN) 375 MG tablet TAKE ONE TABLET BY MOUTH TWICE A DAY WITH MEALS Yes Sunny Huerta MD   fluticasone-umeclidin-vilant (TRELEGY ELLIPTA) 100-62.5-25 MCG/INH AEPB Inhale 1 puff into the lungs daily Yes Shaquille Berman MD   losartan-hydroCHLOROthiazide (HYZAAR) 50-12.5 MG per tablet TAKE ONE TABLET BY MOUTH DAILY  Patient taking differently: Take 0.5 tablets by mouth daily Yes Dariel Dubin, APRN - CNP   tamsulosin (FLOMAX) 0.4 mg capsule TAKE ONE CAPSULE BY MOUTH DAILY Yes Arely Mott MD     [facility] (Including outside providers/suppliers regularly involved in providing care):   Patient Care Team:  Arely Mott MD as PCP - General (Family Medicine)  Arely Mott MD as PCP - [facility] Empaneled Provider     Reviewed and updated this visit:  Allergies  Meds  Problems         Jasiel Teran, was evaluated through a synchronous (real-time) audio-video encounter. The patient (or guardian if applicable) is aware that this is a billable service, which includes applicable co-pays. This Virtual Visit was conducted with patient's (and/or legal guardian's) consent. The visit was conducted pursuant to the emergency declaration under the [facility], [address] [place] [address] authority and the [employer] General Act. Patient identification was verified, and a caregiver was present when appropriate. The patient was located at Home: [address] [place] [address].    Provider was located at Home ([facility]): [place].     F/u in 6 months

## 2022-11-08 NOTE — PATIENT INSTRUCTIONS
Personalized Preventive Plan for Deonte Sawant - 11/8/2022  Medicare offers a range of preventive health benefits. Some of the tests and screenings are paid in full while other may be subject to a deductible, co-insurance, and/or copay. Some of these benefits include a comprehensive review of your medical history including lifestyle, illnesses that may run in your family, and various assessments and screenings as appropriate. After reviewing your medical record and screening and assessments performed today your provider may have ordered immunizations, labs, imaging, and/or referrals for you. A list of these orders (if applicable) as well as your Preventive Care list are included within your After Visit Summary for your review. Other Preventive Recommendations:    A preventive eye exam performed by an eye specialist is recommended every 1-2 years to screen for glaucoma; cataracts, macular degeneration, and other eye disorders. A preventive dental visit is recommended every 6 months. Try to get at least 150 minutes of exercise per week or 10,000 steps per day on a pedometer . Order or download the FREE \"Exercise & Physical Activity: Your Everyday Guide\" from [employer]. Call 9-712.127.3473 or search [employer] online. You need 8982-2051 mg of calcium and 9836-9696 IU of vitamin D per day. It is possible to meet your calcium requirement with diet alone, but a vitamin D supplement is usually necessary to meet this goal.  When exposed to the sun, use a sunscreen that protects against both UVA and UVB radiation with an SPF of 30 or greater. Reapply every 2 to 3 hours or after sweating, drying off with a towel, or swimming. Always wear a seat belt when traveling in a car. Always wear a helmet when riding a bicycle or motorcycle.

## 2022-11-29 RX ORDER — TAMSULOSIN HYDROCHLORIDE 0.4 MG/1
CAPSULE ORAL
Qty: 90 CAPSULE | Refills: 1 | Status: SHIPPED | OUTPATIENT
Start: 2022-11-29

## 2023-01-11 DIAGNOSIS — Z87.39 HISTORY OF GOUT: ICD-10-CM

## 2023-01-11 RX ORDER — NAPROXEN 375 MG/1
TABLET ORAL
Qty: 60 TABLET | Refills: 1 | Status: SHIPPED | OUTPATIENT
Start: 2023-01-11

## 2023-03-21 RX ORDER — FLUTICASONE FUROATE, UMECLIDINIUM BROMIDE AND VILANTEROL TRIFENATATE 100; 62.5; 25 UG/1; UG/1; UG/1
POWDER RESPIRATORY (INHALATION)
Qty: 1 EACH | Refills: 5 | Status: SHIPPED | OUTPATIENT
Start: 2023-03-21

## 2023-04-24 ENCOUNTER — OFFICE VISIT (OUTPATIENT)
Dept: PULMONOLOGY | Age: 73
End: 2023-04-24
Payer: MEDICARE

## 2023-04-24 VITALS
OXYGEN SATURATION: 96 % | SYSTOLIC BLOOD PRESSURE: 140 MMHG | TEMPERATURE: 97.8 F | HEIGHT: 66 IN | RESPIRATION RATE: 18 BRPM | BODY MASS INDEX: 36.48 KG/M2 | HEART RATE: 95 BPM | DIASTOLIC BLOOD PRESSURE: 74 MMHG | WEIGHT: 227 LBS

## 2023-04-24 DIAGNOSIS — Z72.0 TOBACCO USE: ICD-10-CM

## 2023-04-24 DIAGNOSIS — J44.1 COPD EXACERBATION (HCC): Primary | ICD-10-CM

## 2023-04-24 PROCEDURE — 1123F ACP DISCUSS/DSCN MKR DOCD: CPT | Performed by: INTERNAL MEDICINE

## 2023-04-24 PROCEDURE — 99214 OFFICE O/P EST MOD 30 MIN: CPT | Performed by: INTERNAL MEDICINE

## 2023-04-24 PROCEDURE — 4004F PT TOBACCO SCREEN RCVD TLK: CPT | Performed by: INTERNAL MEDICINE

## 2023-04-24 PROCEDURE — 3023F SPIROM DOC REV: CPT | Performed by: INTERNAL MEDICINE

## 2023-04-24 PROCEDURE — G8417 CALC BMI ABV UP PARAM F/U: HCPCS | Performed by: INTERNAL MEDICINE

## 2023-04-24 PROCEDURE — 3074F SYST BP LT 130 MM HG: CPT | Performed by: INTERNAL MEDICINE

## 2023-04-24 PROCEDURE — G8427 DOCREV CUR MEDS BY ELIG CLIN: HCPCS | Performed by: INTERNAL MEDICINE

## 2023-04-24 PROCEDURE — 3078F DIAST BP <80 MM HG: CPT | Performed by: INTERNAL MEDICINE

## 2023-04-24 PROCEDURE — 3017F COLORECTAL CA SCREEN DOC REV: CPT | Performed by: INTERNAL MEDICINE

## 2023-04-24 PROCEDURE — 99406 BEHAV CHNG SMOKING 3-10 MIN: CPT | Performed by: INTERNAL MEDICINE

## 2023-04-24 RX ORDER — PREDNISONE 50 MG/1
50 TABLET ORAL DAILY
Qty: 5 TABLET | Refills: 0 | Status: SHIPPED | OUTPATIENT
Start: 2023-04-24 | End: 2023-04-29

## 2023-04-24 RX ORDER — ALBUTEROL SULFATE 2.5 MG/3ML
2.5 SOLUTION RESPIRATORY (INHALATION) EVERY 6 HOURS PRN
Qty: 120 EACH | Refills: 11 | Status: SHIPPED | OUTPATIENT
Start: 2023-04-24

## 2023-04-24 NOTE — PROGRESS NOTES
changes in the spine. No acute  osseous destructive process or axillary adenopathy. Impression  1. Respiratory motion artifact complicates assessment of distal pulmonary  arterial branches. Inter septal emboli not excluded. No central or lobar  emboli. 2.  Emphysematous changes in the lungs. 3.  Bilateral hypodense adrenal masses likely adenomas requiring no further  follow-up. RECOMMENDATIONS:  Unavailable      CXR PA/LAT: No results found for this or any previous visit. CXR portable: Results for orders placed during the hospital encounter of 03/08/22    XR CHEST PORTABLE    Narrative  EXAMINATION: XR CHEST PORTABLE    HISTORY: Shortness of breath    COMPARISON: None. TECHNIQUE: Portable chest    FINDINGS:    Poor inspiratory effort. The lung parenchyma is free of consolidation or  infiltrate. No pneumothorax or pleural effusion. The cardiac, mediastinal and  hilar contours are normal. The visualized osseous structures exhibit no gross  abnormality. Report electronically signed by: Dr. Adonis Bolaños    Impression  No acute cardiopulmonary abnormality. Pulmonary function testing  10/3/2022:  FEV1 1.45 L 54% predicted ratio 54% with evidence of air trapping no hyperinflation. DLCO 75% predicted.   Assessment/Plan:            Problem List Items Addressed This Visit       COPD exacerbation ([address] - Primary    Relevant Medications    predniSONE (DELTASONE) 50 MG tablet    albuterol (PROVENTIL) (2.5 MG/3ML) 0.083% nebulizer solution    Other Relevant Orders    DME Order for (Specify) as OP

## 2023-04-25 RX ORDER — FEBUXOSTAT 40 MG/1
TABLET, FILM COATED ORAL
Qty: 90 TABLET | Refills: 0 | Status: SHIPPED | OUTPATIENT
Start: 2023-04-25

## 2023-05-01 RX ORDER — LOVASTATIN 40 MG/1
TABLET ORAL
Qty: 90 TABLET | Refills: 1 | Status: SHIPPED | OUTPATIENT
Start: 2023-05-01

## 2023-05-04 ENCOUNTER — TELEPHONE (OUTPATIENT)
Dept: PULMONOLOGY | Age: 73
End: 2023-05-04

## 2023-05-04 RX ORDER — AZITHROMYCIN 250 MG/1
250 TABLET, FILM COATED ORAL SEE ADMIN INSTRUCTIONS
Qty: 6 TABLET | Refills: 0 | Status: SHIPPED | OUTPATIENT
Start: 2023-05-04 | End: 2023-05-09

## 2023-05-31 RX ORDER — TAMSULOSIN HYDROCHLORIDE 0.4 MG/1
CAPSULE ORAL
Qty: 90 CAPSULE | Refills: 1 | Status: SHIPPED | OUTPATIENT
Start: 2023-05-31

## 2023-06-28 DIAGNOSIS — I10 ESSENTIAL HYPERTENSION: ICD-10-CM

## 2023-06-28 RX ORDER — LOSARTAN POTASSIUM AND HYDROCHLOROTHIAZIDE 12.5; 5 MG/1; MG/1
1 TABLET ORAL DAILY
Qty: 90 TABLET | Refills: 0 | Status: SHIPPED | OUTPATIENT
Start: 2023-06-28

## 2023-07-25 ENCOUNTER — TELEPHONE (OUTPATIENT)
Dept: FAMILY MEDICINE CLINIC | Age: 73
End: 2023-07-25

## 2023-07-25 NOTE — TELEPHONE ENCOUNTER
Patient scheduled    Patient does not have a copy of his lab results, he thought he was a [facility] Dr Josselin Perez

## 2023-07-25 NOTE — TELEPHONE ENCOUNTER
Patient called in stating that he went to see his dermatologist Dr. Meme Angel and he said his potassium was low and wanted him to follow up with Dr. Sheikh Lay     Please Advise

## 2023-07-25 NOTE — TELEPHONE ENCOUNTER
He is due for follow up, please assist him in scheduling.   In the meantime if he can get us access to the lab results I'm happy to take a look

## 2023-08-04 SDOH — ECONOMIC STABILITY: INCOME INSECURITY: HOW HARD IS IT FOR YOU TO PAY FOR THE VERY BASICS LIKE FOOD, HOUSING, MEDICAL CARE, AND HEATING?: NOT HARD AT ALL

## 2023-08-04 SDOH — ECONOMIC STABILITY: HOUSING INSECURITY
IN THE LAST 12 MONTHS, WAS THERE A TIME WHEN YOU DID NOT HAVE A STEADY PLACE TO SLEEP OR SLEPT IN A SHELTER (INCLUDING NOW)?: NO

## 2023-08-04 SDOH — ECONOMIC STABILITY: FOOD INSECURITY: WITHIN THE PAST 12 MONTHS, THE FOOD YOU BOUGHT JUST DIDN'T LAST AND YOU DIDN'T HAVE MONEY TO GET MORE.: NEVER TRUE

## 2023-08-04 SDOH — ECONOMIC STABILITY: FOOD INSECURITY: WITHIN THE PAST 12 MONTHS, YOU WORRIED THAT YOUR FOOD WOULD RUN OUT BEFORE YOU GOT MONEY TO BUY MORE.: NEVER TRUE

## 2023-08-04 SDOH — ECONOMIC STABILITY: TRANSPORTATION INSECURITY
IN THE PAST 12 MONTHS, HAS LACK OF TRANSPORTATION KEPT YOU FROM MEETINGS, WORK, OR FROM GETTING THINGS NEEDED FOR DAILY LIVING?: NO

## 2023-08-04 ASSESSMENT — PATIENT HEALTH QUESTIONNAIRE - PHQ9
SUM OF ALL RESPONSES TO PHQ QUESTIONS 1-9: 0
1. LITTLE INTEREST OR PLEASURE IN DOING THINGS: NOT AT ALL
2. FEELING DOWN, DEPRESSED OR HOPELESS: NOT AT ALL
SUM OF ALL RESPONSES TO PHQ9 QUESTIONS 1 & 2: 0
SUM OF ALL RESPONSES TO PHQ QUESTIONS 1-9: 0
1. LITTLE INTEREST OR PLEASURE IN DOING THINGS: 0
SUM OF ALL RESPONSES TO PHQ QUESTIONS 1-9: 0
2. FEELING DOWN, DEPRESSED OR HOPELESS: 0
SUM OF ALL RESPONSES TO PHQ9 QUESTIONS 1 & 2: 0
SUM OF ALL RESPONSES TO PHQ QUESTIONS 1-9: 0

## 2023-08-07 ENCOUNTER — OFFICE VISIT (OUTPATIENT)
Dept: FAMILY MEDICINE CLINIC | Age: 73
End: 2023-08-07

## 2023-08-07 VITALS
RESPIRATION RATE: 16 BRPM | WEIGHT: 227 LBS | OXYGEN SATURATION: 94 % | DIASTOLIC BLOOD PRESSURE: 74 MMHG | HEART RATE: 71 BPM | BODY MASS INDEX: 36.48 KG/M2 | HEIGHT: 66 IN | SYSTOLIC BLOOD PRESSURE: 126 MMHG

## 2023-08-07 DIAGNOSIS — I10 ESSENTIAL HYPERTENSION: ICD-10-CM

## 2023-08-07 DIAGNOSIS — E87.6 LOW SERUM POTASSIUM: ICD-10-CM

## 2023-08-07 DIAGNOSIS — R35.0 BENIGN PROSTATIC HYPERPLASIA WITH URINARY FREQUENCY: ICD-10-CM

## 2023-08-07 DIAGNOSIS — Z87.39 HISTORY OF GOUT: ICD-10-CM

## 2023-08-07 DIAGNOSIS — N40.1 BENIGN PROSTATIC HYPERPLASIA WITH URINARY FREQUENCY: ICD-10-CM

## 2023-08-07 DIAGNOSIS — E78.2 MIXED HYPERLIPIDEMIA: ICD-10-CM

## 2023-08-07 DIAGNOSIS — I10 ESSENTIAL HYPERTENSION: Primary | ICD-10-CM

## 2023-08-07 LAB
ANION GAP SERPL CALCULATED.3IONS-SCNC: 12 MMOL/L (ref 3–16)
BUN SERPL-MCNC: 16 MG/DL (ref 7–20)
CALCIUM SERPL-MCNC: 9.4 MG/DL (ref 8.3–10.6)
CHLORIDE SERPL-SCNC: 103 MMOL/L (ref 99–110)
CHOLEST SERPL-MCNC: 160 MG/DL (ref 0–199)
CO2 SERPL-SCNC: 25 MMOL/L (ref 21–32)
CREAT SERPL-MCNC: 1.1 MG/DL (ref 0.8–1.3)
GFR SERPLBLD CREATININE-BSD FMLA CKD-EPI: >60 ML/MIN/{1.73_M2}
GLUCOSE SERPL-MCNC: 105 MG/DL (ref 70–99)
HDLC SERPL-MCNC: 42 MG/DL (ref 40–60)
LDLC SERPL CALC-MCNC: 82 MG/DL
MAGNESIUM SERPL-MCNC: 2.2 MG/DL (ref 1.8–2.4)
POTASSIUM SERPL-SCNC: 4.6 MMOL/L (ref 3.5–5.1)
SODIUM SERPL-SCNC: 140 MMOL/L (ref 136–145)
TRIGL SERPL-MCNC: 182 MG/DL (ref 0–150)
VLDLC SERPL CALC-MCNC: 36 MG/DL

## 2023-08-09 RX ORDER — FEBUXOSTAT 40 MG/1
TABLET, FILM COATED ORAL
Qty: 90 TABLET | Refills: 0 | Status: SHIPPED | OUTPATIENT
Start: 2023-08-09

## 2023-09-12 RX ORDER — FLUTICASONE FUROATE, UMECLIDINIUM BROMIDE AND VILANTEROL TRIFENATATE 100; 62.5; 25 UG/1; UG/1; UG/1
POWDER RESPIRATORY (INHALATION)
Qty: 60 EACH | Refills: 5 | Status: SHIPPED | OUTPATIENT
Start: 2023-09-12 | End: 2023-09-13 | Stop reason: SDUPTHER

## 2023-09-13 ENCOUNTER — TELEPHONE (OUTPATIENT)
Dept: PULMONOLOGY | Age: 73
End: 2023-09-13

## 2023-09-13 RX ORDER — FLUTICASONE FUROATE, UMECLIDINIUM BROMIDE AND VILANTEROL TRIFENATATE 100; 62.5; 25 UG/1; UG/1; UG/1
POWDER RESPIRATORY (INHALATION)
Qty: 60 EACH | Refills: 5 | Status: SHIPPED | OUTPATIENT
Start: 2023-09-13

## 2023-09-13 NOTE — TELEPHONE ENCOUNTER
Christina Giraldo says Zainab Velez won't refill his Trelegy. He says they said we denied it because he needs an appointment. He has 2 charts merging so maybe we looked at the wrong one? He's out of Trelegy and would like to pick it up today if possible. He's coming in Monday and was seen a few months ago. Can we send the script today please?

## 2023-09-18 ENCOUNTER — OFFICE VISIT (OUTPATIENT)
Dept: PULMONOLOGY | Age: 73
End: 2023-09-18
Payer: MEDICARE

## 2023-09-18 VITALS
HEIGHT: 67 IN | SYSTOLIC BLOOD PRESSURE: 116 MMHG | DIASTOLIC BLOOD PRESSURE: 70 MMHG | OXYGEN SATURATION: 95 % | BODY MASS INDEX: 36.13 KG/M2 | HEART RATE: 86 BPM | RESPIRATION RATE: 18 BRPM | WEIGHT: 230.2 LBS | TEMPERATURE: 97.8 F

## 2023-09-18 DIAGNOSIS — Z72.0 TOBACCO USE: ICD-10-CM

## 2023-09-18 DIAGNOSIS — J41.1 MUCOPURULENT CHRONIC BRONCHITIS (HCC): Primary | ICD-10-CM

## 2023-09-18 PROCEDURE — 99213 OFFICE O/P EST LOW 20 MIN: CPT | Performed by: INTERNAL MEDICINE

## 2023-09-18 PROCEDURE — 3017F COLORECTAL CA SCREEN DOC REV: CPT | Performed by: INTERNAL MEDICINE

## 2023-09-18 PROCEDURE — 3074F SYST BP LT 130 MM HG: CPT | Performed by: INTERNAL MEDICINE

## 2023-09-18 PROCEDURE — 3078F DIAST BP <80 MM HG: CPT | Performed by: INTERNAL MEDICINE

## 2023-09-18 PROCEDURE — 3023F SPIROM DOC REV: CPT | Performed by: INTERNAL MEDICINE

## 2023-09-18 PROCEDURE — G8417 CALC BMI ABV UP PARAM F/U: HCPCS | Performed by: INTERNAL MEDICINE

## 2023-09-18 PROCEDURE — 4004F PT TOBACCO SCREEN RCVD TLK: CPT | Performed by: INTERNAL MEDICINE

## 2023-09-18 PROCEDURE — G8427 DOCREV CUR MEDS BY ELIG CLIN: HCPCS | Performed by: INTERNAL MEDICINE

## 2023-09-18 PROCEDURE — 1123F ACP DISCUSS/DSCN MKR DOCD: CPT | Performed by: INTERNAL MEDICINE

## 2023-09-18 NOTE — PROGRESS NOTES
Pulmonary Progress note           REASON FOR follow up:   Chief Complaint   Patient presents with    Follow-up    COPD          PCP: Pk Leon MD        Assessment and Plan:   Diagnosis Orders   1. Mucopurulent chronic bronchitis ([address])        2. Tobacco use                Plan:  Strongly advised to quit smoking  Continue Trelegy  CT scan lung cancer screening was ordered but not done         HISTORY OF PRESENT ILLNESS: Paco Ohara is very pleasant 68y.o. year old gentleman with medical history stated below significant for chronic active smoker with more than 30-pack-year history of smoking, clinically severe COPD no PFT on file, was seen recently at [facility] [place] when he was admitted with acute hypoxic respiratory failure secondary to COPD exacerbation, treated with antibiotic and steroid, prior to his discharge home O2 evaluation did not require oxygen, here today for follow-up currently on Trelegy daily and as needed Combivent, has been doing good, he still smokes but is cutting down. He has obstructive sleep apnea on CPAP therapy,    Is retired in the past he worked with [employer]. FEV1 1.45 L 54% predicted ratio 54% with evidence of air trapping no hyperinflation. DLCO 75% predicte    Currently taking azithromycin for COPD exacerbation with persistent cough congestion and shortness of breath. Continues to smoke. Using Trelegy daily.     Past Medical History:   Diagnosis Date    A-fib [facility])     Arthritis     Chronic kidney disease     COPD (chronic obstructive pulmonary disease) (HCC)     Dermatitis     Diabetes mellitus ([address])     Dysphagia     GERD (gastroesophageal reflux disease)     Hydradenitis     Hypertension     Lymphedema     Lymphedema     Morbid obesity (HCC)     STEPHEN (obstructive sleep apnea)     Pneumonia     Respiratory failure, chronic (HCC)     Shortness of breath     Unsteady gait     Venous insufficiency        Past Surgical History:   Procedure Laterality Date    BACK

## 2023-09-20 DIAGNOSIS — I10 ESSENTIAL HYPERTENSION: ICD-10-CM

## 2023-09-20 RX ORDER — LOSARTAN POTASSIUM AND HYDROCHLOROTHIAZIDE 12.5; 5 MG/1; MG/1
1 TABLET ORAL DAILY
Qty: 90 TABLET | Refills: 0 | Status: SHIPPED | OUTPATIENT
Start: 2023-09-20

## 2023-10-30 RX ORDER — LOVASTATIN 40 MG/1
TABLET ORAL
Qty: 90 TABLET | Refills: 1 | Status: SHIPPED | OUTPATIENT
Start: 2023-10-30

## 2023-11-08 RX ORDER — FEBUXOSTAT 40 MG/1
40 TABLET, FILM COATED ORAL DAILY
Qty: 90 TABLET | Refills: 0 | Status: SHIPPED | OUTPATIENT
Start: 2023-11-08

## 2023-11-27 RX ORDER — TAMSULOSIN HYDROCHLORIDE 0.4 MG/1
CAPSULE ORAL
Qty: 90 CAPSULE | Refills: 1 | Status: SHIPPED | OUTPATIENT
Start: 2023-11-27

## 2023-12-27 DIAGNOSIS — I10 ESSENTIAL HYPERTENSION: ICD-10-CM

## 2023-12-27 RX ORDER — LOSARTAN POTASSIUM AND HYDROCHLOROTHIAZIDE 12.5; 5 MG/1; MG/1
1 TABLET ORAL DAILY
Qty: 90 TABLET | Refills: 0 | Status: SHIPPED | OUTPATIENT
Start: 2023-12-27

## 2024-02-06 RX ORDER — FEBUXOSTAT 40 MG/1
40 TABLET, FILM COATED ORAL DAILY
Qty: 90 TABLET | Refills: 0 | Status: SHIPPED | OUTPATIENT
Start: 2024-02-06

## 2024-03-07 ENCOUNTER — OFFICE VISIT (OUTPATIENT)
Dept: FAMILY MEDICINE CLINIC | Age: 74
End: 2024-03-07

## 2024-03-07 VITALS
DIASTOLIC BLOOD PRESSURE: 68 MMHG | WEIGHT: 229 LBS | RESPIRATION RATE: 16 BRPM | HEIGHT: 67 IN | OXYGEN SATURATION: 95 % | BODY MASS INDEX: 35.94 KG/M2 | SYSTOLIC BLOOD PRESSURE: 122 MMHG | HEART RATE: 98 BPM

## 2024-03-07 DIAGNOSIS — Z98.890 HISTORY OF COLONOSCOPY WITH POLYPECTOMY: ICD-10-CM

## 2024-03-07 DIAGNOSIS — R35.0 BENIGN PROSTATIC HYPERPLASIA WITH URINARY FREQUENCY: ICD-10-CM

## 2024-03-07 DIAGNOSIS — N40.1 BENIGN PROSTATIC HYPERPLASIA WITH URINARY FREQUENCY: ICD-10-CM

## 2024-03-07 DIAGNOSIS — E66.01 SEVERE OBESITY (BMI 35.0-39.9) WITH COMORBIDITY (HCC): ICD-10-CM

## 2024-03-07 DIAGNOSIS — R73.03 PREDIABETES: ICD-10-CM

## 2024-03-07 DIAGNOSIS — Z86.010 HISTORY OF COLONOSCOPY WITH POLYPECTOMY: ICD-10-CM

## 2024-03-07 DIAGNOSIS — J41.1 MUCOPURULENT CHRONIC BRONCHITIS (HCC): ICD-10-CM

## 2024-03-07 DIAGNOSIS — Z00.00 MEDICARE ANNUAL WELLNESS VISIT, SUBSEQUENT: Primary | ICD-10-CM

## 2024-03-07 LAB — PSA SERPL DL<=0.01 NG/ML-MCNC: 4.59 NG/ML (ref 0–4)

## 2024-03-07 RX ORDER — SILODOSIN 4 MG/1
4 CAPSULE ORAL EVERY EVENING
Qty: 90 CAPSULE | Refills: 1 | Status: SHIPPED | OUTPATIENT
Start: 2024-03-07

## 2024-03-07 ASSESSMENT — PATIENT HEALTH QUESTIONNAIRE - PHQ9
SUM OF ALL RESPONSES TO PHQ QUESTIONS 1-9: 0
SUM OF ALL RESPONSES TO PHQ9 QUESTIONS 1 & 2: 0
SUM OF ALL RESPONSES TO PHQ QUESTIONS 1-9: 0
SUM OF ALL RESPONSES TO PHQ QUESTIONS 1-9: 0
2. FEELING DOWN, DEPRESSED OR HOPELESS: 0
SUM OF ALL RESPONSES TO PHQ QUESTIONS 1-9: 0
1. LITTLE INTEREST OR PLEASURE IN DOING THINGS: 0

## 2024-03-07 ASSESSMENT — LIFESTYLE VARIABLES
HOW OFTEN DO YOU HAVE A DRINK CONTAINING ALCOHOL: NEVER
HOW MANY STANDARD DRINKS CONTAINING ALCOHOL DO YOU HAVE ON A TYPICAL DAY: PATIENT DOES NOT DRINK

## 2024-03-07 NOTE — PATIENT INSTRUCTIONS
help?   Call 911 if you have symptoms of a heart attack. These may include:    Chest pain or pressure, or a strange feeling in the chest.     Sweating.     Shortness of breath.     Pain, pressure, or a strange feeling in the back, neck, jaw, or upper belly or in one or both shoulders or arms.     Lightheadedness or sudden weakness.     A fast or irregular heartbeat.   After you call 911, the  may tell you to chew 1 adult-strength or 2 to 4 low-dose aspirin. Wait for an ambulance. Do not try to drive yourself.  Watch closely for changes in your health, and be sure to contact your doctor if you have any problems.  Where can you learn more?  Go to https://www.[employer]abb[employer].net/patientEd and enter F075 to learn more about \"A Healthy Heart: Care Instructions.\"  Current as of: June 24, 2023               Content Version: 14.0  © 7556-1141 [employer].   Care instructions adapted under license by [employer]. If you have questions about a medical condition or this instruction, always ask your healthcare professional. [employer] disclaims any warranty or liability for your use of this information.      Personalized Preventive Plan for Damaso Stoner - 3/7/2024  Medicare offers a range of preventive health benefits. Some of the tests and screenings are paid in full while other may be subject to a deductible, co-insurance, and/or copay.    Some of these benefits include a comprehensive review of your medical history including lifestyle, illnesses that may run in your family, and various assessments and screenings as appropriate.    After reviewing your medical record and screening and assessments performed today your provider may have ordered immunizations, labs, imaging, and/or referrals for you.  A list of these orders (if applicable) as well as your Preventive Care list are included within your After Visit Summary for your review.    Other Preventive Recommendations:    A preventive eye

## 2024-03-07 NOTE — PROGRESS NOTES
Damaso Stoner (:  1950) is a 73 y.o. male,Established patient, here for evaluation of the following chief complaint(s):  Medicare AWV (Pt has been taking tramsulosin but sometimes it takes a while some times it steady sometimes it just drips )         ASSESSMENT/PLAN:    2. Benign prostatic hyperplasia with urinary frequency  -     silodosin (RAPAFLO) 4 MG CAPS capsule; Take 1 capsule by mouth every evening, Disp-90 capsule, R-1Normal  -     PSA, Prostatic Specific Antigen ([facility]); Future  -     AFL - Vikas Cardozo MD, Urology, [facility]  3. Severe obesity (BMI 35.0-39.9) with comorbidity (HCC)  4. Mucopurulent chronic bronchitis (HCC)  5. Prediabetes  -     Hemoglobin A1C; Future  6. History of colonoscopy with polypectomy - , repeat   -     AFL - Sivakumar Ozuna MD, Gastroenterology, [facility]    BPH symptoms not well-controlled.  Switch from Flomax to Rapaflo.  If symptoms are not improving, advised consultation with urology to further assess prostate size and see if surgical intervention is needed.  Check PSA.    For prediabetes, discussed lifestyle and monitor A1c    For chronic bronchitis, he follows with pulmonology and symptoms are currently well-controlled.  He has been offered lung CT screening    Return in about 6 months (around 2024) for blood pressure and cholesterol follow up.         Subjective   SUBJECTIVE/OBJECTIVE:  HPI  Notes worsening BPH symptoms. Mostly slower stream and some bouts of difficulty emptying. No significant nocturnal symptoms    HTN  BP Readings from Last 3 Encounters:   24 122/68   23 116/70   23 126/74   On losartan/HCTZ    HLD  On [id number]  Lab Results   Component Value Date    LDLCALC 82 2023         Review of Systems       Objective   Physical Exam             An electronic signature was used to authenticate this note.    --Dragan Huerta MD   
MG/3ML) 0.083% nebulizer solution Take 3 mLs by nebulization every 6 hours as needed for Wheezing Yes Ariella Estevez MD   Adalimumab (HUMIRA PEN) 80 MG/0.8ML PNKT  Yes Provider, Historical, MD       CareTeam (Including outside providers/suppliers regularly involved in providing care):   Patient Care Team:  Dragan Huerta MD as PCP - General (Family Medicine)  Dragan Huerta [facility]     Reviewed and updated this visit:  Tobacco  Allergies  Meds  Med Hx  Surg Hx  Soc Hx  Fam Hx

## 2024-03-08 LAB
EST. AVERAGE GLUCOSE BLD GHB EST-MCNC: 116.9 MG/DL
HBA1C MFR BLD: 5.7 %

## 2024-03-19 DIAGNOSIS — I10 ESSENTIAL HYPERTENSION: ICD-10-CM

## 2024-03-19 RX ORDER — LOSARTAN POTASSIUM AND HYDROCHLOROTHIAZIDE 12.5; 5 MG/1; MG/1
1 TABLET ORAL DAILY
Qty: 90 TABLET | Refills: 0 | Status: SHIPPED | OUTPATIENT
Start: 2024-03-19

## 2024-03-26 ENCOUNTER — OFFICE VISIT (OUTPATIENT)
Dept: PULMONOLOGY | Age: 74
End: 2024-03-26
Payer: MEDICARE

## 2024-03-26 VITALS
RESPIRATION RATE: 18 BRPM | BODY MASS INDEX: 36.42 KG/M2 | HEIGHT: 66 IN | TEMPERATURE: 97.5 F | DIASTOLIC BLOOD PRESSURE: 82 MMHG | WEIGHT: 226.6 LBS | HEART RATE: 83 BPM | OXYGEN SATURATION: 96 % | SYSTOLIC BLOOD PRESSURE: 140 MMHG

## 2024-03-26 DIAGNOSIS — J41.1 MUCOPURULENT CHRONIC BRONCHITIS (HCC): ICD-10-CM

## 2024-03-26 DIAGNOSIS — Z72.0 TOBACCO USE: Primary | ICD-10-CM

## 2024-03-26 DIAGNOSIS — Z87.891 PERSONAL HISTORY OF TOBACCO USE: ICD-10-CM

## 2024-03-26 PROCEDURE — 3023F SPIROM DOC REV: CPT | Performed by: INTERNAL MEDICINE

## 2024-03-26 PROCEDURE — 3077F SYST BP >= 140 MM HG: CPT | Performed by: INTERNAL MEDICINE

## 2024-03-26 PROCEDURE — 99214 OFFICE O/P EST MOD 30 MIN: CPT | Performed by: INTERNAL MEDICINE

## 2024-03-26 PROCEDURE — G0296 VISIT TO DETERM LDCT ELIG: HCPCS | Performed by: INTERNAL MEDICINE

## 2024-03-26 PROCEDURE — 3017F COLORECTAL CA SCREEN DOC REV: CPT | Performed by: INTERNAL MEDICINE

## 2024-03-26 PROCEDURE — G8484 FLU IMMUNIZE NO ADMIN: HCPCS | Performed by: INTERNAL MEDICINE

## 2024-03-26 PROCEDURE — 3079F DIAST BP 80-89 MM HG: CPT | Performed by: INTERNAL MEDICINE

## 2024-03-26 PROCEDURE — G8427 DOCREV CUR MEDS BY ELIG CLIN: HCPCS | Performed by: INTERNAL MEDICINE

## 2024-03-26 PROCEDURE — G8417 CALC BMI ABV UP PARAM F/U: HCPCS | Performed by: INTERNAL MEDICINE

## 2024-03-26 PROCEDURE — 4004F PT TOBACCO SCREEN RCVD TLK: CPT | Performed by: INTERNAL MEDICINE

## 2024-03-26 PROCEDURE — 1123F ACP DISCUSS/DSCN MKR DOCD: CPT | Performed by: INTERNAL MEDICINE

## 2024-03-26 RX ORDER — ALBUTEROL SULFATE 90 UG/1
2 AEROSOL, METERED RESPIRATORY (INHALATION) 4 TIMES DAILY PRN
Qty: 18 G | Refills: 0 | Status: SHIPPED | OUTPATIENT
Start: 2024-03-26

## 2024-03-26 NOTE — PROGRESS NOTES
Pulmonary Progress note           REASON FOR follow up:   Chief Complaint   Patient presents with    Follow-up    COPD     Bronchitis          PCP: Dragan Huerta MD        Assessment and Plan:   Diagnosis Orders   1. Tobacco use  CT Lung Screen (Initial or Annual)      2. Mucopurulent chronic bronchitis (HCC)  albuterol sulfate HFA (VENTOLIN HFA) 108 (90 Base) MCG/ACT inhaler      3. Personal history of tobacco use  CT VISIT TO DISCUSS LUNG CA SCREEN W LDCT    CT Lung Screen (Initial/Annual/Baseline)                Plan:  Strongly advised to quit smoking  Continue Trelegy  CT scan lung cancer screening was ordered but not done         HISTORY OF PRESENT ILLNESS: Damaso Stoner is very pleasant 73 y.o. year old gentleman with medical history stated below significant for chronic active smoker with more than 30-pack-year history of smoking, clinically severe COPD no PFT on file, was seen recently at [facility] when he was admitted with acute hypoxic respiratory failure secondary to COPD exacerbation, treated with antibiotic and steroid, prior to his discharge home O2 evaluation did not require oxygen, here today for follow-up currently on Trelegy daily and as needed Combivent, has been doing good, he still smokes but is cutting down.  He has obstructive sleep apnea on CPAP therapy,    Is retired in the past he worked with [employer].    FEV1 1.45 L 54% predicted ratio 54% with evidence of air trapping no hyperinflation.  DLCO 75% predicte    Continues to smoke.  Using Trelegy daily.      Past Medical History:   Diagnosis Date    Arthritis     COPD (chronic obstructive pulmonary disease) (HCC)     Dermatitis     Dysphagia     GERD (gastroesophageal reflux disease)     Hydradenitis     Hypertension     Lymphedema     Lymphedema     Morbid obesity (HCC)     STEPHEN (obstructive sleep apnea)     Pneumonia     Respiratory failure, chronic (HCC)     Shortness of breath     Unsteady gait     Venous insufficiency

## 2024-03-26 NOTE — PATIENT INSTRUCTIONS
the results of your scan and answer any questions you may have. If you need any follow-up, he or she will help you understand what to do next.  After a lung cancer screening, you can go back to your usual activities right away.  A lung cancer screening test can't tell if you have lung cancer. If your results are positive, your doctor can't tell whether an abnormal finding is a harmless nodule, cancer, or something else without doing more tests.  What can you do to help prevent lung cancer?  Some lung cancers can't be prevented. But if you smoke, quitting smoking is the best step you can take to prevent lung cancer. If you want to quit, your doctor can recommend medicines or other ways to help.  Follow-up care is a key part of your treatment and safety. Be sure to make and go to all appointments, and call your doctor if you are having problems. It's also a good idea to know your test results and keep a list of the medicines you take.  Where can you learn more?  Go to https://www.[employer].net/patientEd and enter Q940 to learn more about \"Learning About Lung Cancer Screening.\"  Current as of: October 25, 2023               Content Version: 14.0  © 1342-3427 [employer].   Care instructions adapted under license by [employer]. If you have questions about a medical condition or this instruction, always ask your healthcare professional. [employer] disclaims any warranty or liability for your use of this information.

## 2024-04-15 RX ORDER — FLUTICASONE FUROATE, UMECLIDINIUM BROMIDE AND VILANTEROL TRIFENATATE 100; 62.5; 25 UG/1; UG/1; UG/1
POWDER RESPIRATORY (INHALATION)
Qty: 60 EACH | Refills: 5 | Status: SHIPPED | OUTPATIENT
Start: 2024-04-15

## 2024-04-29 RX ORDER — LOVASTATIN 40 MG/1
40 TABLET ORAL DAILY
Qty: 90 TABLET | Refills: 1 | Status: SHIPPED | OUTPATIENT
Start: 2024-04-29

## 2024-05-06 RX ORDER — FEBUXOSTAT 40 MG/1
40 TABLET, FILM COATED ORAL DAILY
Qty: 90 TABLET | Refills: 0 | Status: SHIPPED | OUTPATIENT
Start: 2024-05-06

## 2024-06-19 ENCOUNTER — OFFICE VISIT (OUTPATIENT)
Dept: FAMILY MEDICINE CLINIC | Age: 74
End: 2024-06-19

## 2024-06-19 VITALS
HEIGHT: 66 IN | TEMPERATURE: 98.3 F | HEART RATE: 85 BPM | BODY MASS INDEX: 35.36 KG/M2 | RESPIRATION RATE: 16 BRPM | WEIGHT: 220 LBS | OXYGEN SATURATION: 97 % | SYSTOLIC BLOOD PRESSURE: 120 MMHG | DIASTOLIC BLOOD PRESSURE: 68 MMHG

## 2024-06-19 DIAGNOSIS — R50.9 FEBRILE ILLNESS, ACUTE: Primary | ICD-10-CM

## 2024-06-19 NOTE — PATIENT INSTRUCTIONS
INSTRUCTIONS  Tylenol for fever  Watch for new symptoms to develop.  Return if still fever next week for further evaluation.

## 2024-06-19 NOTE — PROGRESS NOTES
PROBLEM VISIT NOTE     Subjective:     Chief Complaint   Patient presents with    Fever     Was running fever of 100 degrees had the chills  pt feels very fatigued.  X 1 month ago he had a fever for 4 days and slept for two days straight.  Hasn't been on humira for a couple months.  They are sending him cosyntex      Damaso Stoner is a 73 y.o. male who presents fever last evening with chills, temp 100. Down with tylenol.    About 3 weeks ago had fever and fatigue for 4 days and been fine since.    Off Humira x 2 months. Not yet started Cosyntex for hidradenitis. Not feeling a flare up.    No URI or GI or  symptoms.    Lungs doing well with Trelegy. Not needing rescue inhaler.    CHART REVIEW   reports that he has been smoking cigarettes. He started smoking about 57 years ago. He has a 28.7 pack-year smoking history. He has been exposed to tobacco smoke. He has never used smokeless tobacco.  Health Maintenance Due   Topic Date Due    Respiratory Syncytial Virus (RSV) Pregnant or age 60 yrs+ (1 - 1-dose 60+ series) Never done    Shingles vaccine (2 of 3) 03/12/2014    Low dose CT lung screening &/or counseling  01/28/2021     Current Outpatient Medications   Medication Instructions    albuterol (PROVENTIL) 2.5 mg, Nebulization, EVERY 6 HOURS PRN    albuterol sulfate HFA (VENTOLIN HFA) 108 (90 Base) MCG/ACT inhaler 2 puffs, Inhalation, 4 TIMES DAILY PRN    febuxostat (ULORIC) 40 mg, Oral, DAILY    fluticasone-umeclidin-vilant (TRELEGY ELLIPTA) 100-62.5-25 MCG/ACT AEPB inhaler INHALE ONE PUFF BY MOUTH DAILY    losartan-hydroCHLOROthiazide (HYZAAR) 50-12.5 MG per tablet 1 tablet, Oral, DAILY    lovastatin (MEVACOR) 40 mg, Oral, DAILY     LAST LABS  LDL Calculated   Date Value Ref Range Status   08/07/2023 82 <100 mg/dL Final     Lab Results   Component Value Date    HDL 42 08/07/2023     Lab Results   Component Value Date    TRIG 182 (H) 08/07/2023     Lab Results   Component Value Date     11/28/2023    K 4.2

## 2024-06-28 ENCOUNTER — TELEPHONE (OUTPATIENT)
Dept: FAMILY MEDICINE CLINIC | Age: 74
End: 2024-06-28

## 2024-06-28 ENCOUNTER — OFFICE VISIT (OUTPATIENT)
Dept: FAMILY MEDICINE CLINIC | Age: 74
End: 2024-06-28
Payer: MEDICARE

## 2024-06-28 VITALS
HEART RATE: 67 BPM | BODY MASS INDEX: 35 KG/M2 | OXYGEN SATURATION: 98 % | SYSTOLIC BLOOD PRESSURE: 118 MMHG | WEIGHT: 217.8 LBS | DIASTOLIC BLOOD PRESSURE: 68 MMHG | RESPIRATION RATE: 18 BRPM | TEMPERATURE: 98.1 F | HEIGHT: 66 IN

## 2024-06-28 DIAGNOSIS — R82.90 MALODOROUS URINE: ICD-10-CM

## 2024-06-28 DIAGNOSIS — I10 ESSENTIAL HYPERTENSION: ICD-10-CM

## 2024-06-28 DIAGNOSIS — N30.90 CYSTITIS: ICD-10-CM

## 2024-06-28 DIAGNOSIS — R50.9 FUO (FEVER OF UNKNOWN ORIGIN): Primary | ICD-10-CM

## 2024-06-28 LAB
BILIRUBIN, POC: NORMAL
BLOOD URINE, POC: NORMAL
CLARITY, POC: CLEAR
COLOR, POC: YELLOW
GLUCOSE URINE, POC: NORMAL
KETONES, POC: NORMAL
LEUKOCYTE EST, POC: NORMAL
NITRITE, POC: NORMAL
PH, POC: 6
PROTEIN, POC: NORMAL
SPECIFIC GRAVITY, POC: 1.02
UROBILINOGEN, POC: 0.2

## 2024-06-28 PROCEDURE — 3078F DIAST BP <80 MM HG: CPT | Performed by: FAMILY MEDICINE

## 2024-06-28 PROCEDURE — 3017F COLORECTAL CA SCREEN DOC REV: CPT | Performed by: FAMILY MEDICINE

## 2024-06-28 PROCEDURE — 81002 URINALYSIS NONAUTO W/O SCOPE: CPT | Performed by: FAMILY MEDICINE

## 2024-06-28 PROCEDURE — G8427 DOCREV CUR MEDS BY ELIG CLIN: HCPCS | Performed by: FAMILY MEDICINE

## 2024-06-28 PROCEDURE — 99214 OFFICE O/P EST MOD 30 MIN: CPT | Performed by: FAMILY MEDICINE

## 2024-06-28 PROCEDURE — 3074F SYST BP LT 130 MM HG: CPT | Performed by: FAMILY MEDICINE

## 2024-06-28 PROCEDURE — G8417 CALC BMI ABV UP PARAM F/U: HCPCS | Performed by: FAMILY MEDICINE

## 2024-06-28 PROCEDURE — 4004F PT TOBACCO SCREEN RCVD TLK: CPT | Performed by: FAMILY MEDICINE

## 2024-06-28 PROCEDURE — 1123F ACP DISCUSS/DSCN MKR DOCD: CPT | Performed by: FAMILY MEDICINE

## 2024-06-28 RX ORDER — LOSARTAN POTASSIUM AND HYDROCHLOROTHIAZIDE 12.5; 5 MG/1; MG/1
1 TABLET ORAL DAILY
Qty: 90 TABLET | Refills: 0 | Status: SHIPPED | OUTPATIENT
Start: 2024-06-28

## 2024-06-28 RX ORDER — SULFAMETHOXAZOLE AND TRIMETHOPRIM 800; 160 MG/1; MG/1
1 TABLET ORAL 2 TIMES DAILY
Qty: 20 TABLET | Refills: 0 | Status: SHIPPED | OUTPATIENT
Start: 2024-06-28 | End: 2024-07-08

## 2024-06-28 NOTE — PATIENT INSTRUCTIONS
INSTRUCTIONS  PLEASE GET BLOODWORK DRAWN SOON.  Lab is on first floor in [address]. Hours Monday to Friday 6:30 AM to 4 PM AND Saturday 8-12.    Please finish taking ALL of the antibiotics.    Will call with urine culture results on Monday.

## 2024-06-28 NOTE — PROGRESS NOTES
inhaler Inhale 2 puffs into the lungs 4 times daily as needed for Wheezing Yes Ariella Estevez MD   albuterol (PROVENTIL) (2.5 MG/3ML) 0.083% nebulizer solution Take 3 mLs by nebulization every 6 hours as needed for Wheezing Yes Ariella Estevez MD      Social History     Tobacco Use    Smoking status: Every Day     Current packs/day: 0.50     Average packs/day: 0.5 packs/day for 57.5 years (28.7 ttl pk-yrs)     Types: Cigarettes     Start date: 1/1/1967     Passive exposure: Past    Smokeless tobacco: Never    Tobacco comments:     has back to half   Vaping Use    Vaping Use: Never used   Substance Use Topics    Alcohol use: Not Currently    Drug use: Never      LAST LABS  Cholesterol, Total   Date Value Ref Range Status   08/07/2023 160 0 - 199 mg/dL Final     LDL Calculated   Date Value Ref Range Status   08/07/2023 82 <100 mg/dL Final     HDL   Date Value Ref Range Status   08/07/2023 42 40 - 60 mg/dL Final     Comment:     An HDL cholesterol less than 40 mg/dL is low and  constitutes a coronary heart disease risk factor.  An HDL cholesterol greater than 60 mg/dL is a  negative risk factor for coronary heart disease.       Triglycerides   Date Value Ref Range Status   08/07/2023 182 (H) 0 - 150 mg/dL Final     Lab Results   Component Value Date    GLUCOSE 130 (H) 11/28/2023     Lab Results   Component Value Date     11/28/2023    K 4.2 11/28/2023    CREATININE 1.1 11/28/2023     Lab Results   Component Value Date    WBC 11.7 (H) 11/28/2023    HGB 14.4 11/28/2023    HCT 43.1 11/28/2023    MCV 89.1 11/28/2023     11/28/2023     Lab Results   Component Value Date    ALT 11 11/28/2023    AST 10 (L) 11/28/2023    ALKPHOS 93 11/28/2023    BILITOT 0.3 11/28/2023     No results found for: \"TSH\"  Lab Results   Component Value Date    LABA1C 5.7 03/07/2024     Objective:   PHYSICAL EXAM  /68 (Site: Right Upper Arm, Position: Sitting, Cuff Size: Medium Adult)   Pulse 67   Temp 98.1 °F (36.7 °C)

## 2024-06-28 NOTE — TELEPHONE ENCOUNTER
LOV 6/29/24 pt saw Dr Silva was not having symptoms 2 days later he had chills and a fever pt says he is fine but this comes back every few days on the fever   Pt stated his urine has a odor so he is wondering if he need a blood test to look further ? He stated he has some prostate issues?       Please advise

## 2024-06-30 LAB — BACTERIA UR CULT: NORMAL

## 2024-07-01 DIAGNOSIS — R50.9 FUO (FEVER OF UNKNOWN ORIGIN): ICD-10-CM

## 2024-07-01 LAB
ALBUMIN SERPL-MCNC: 4.2 G/DL (ref 3.4–5)
ALBUMIN/GLOB SERPL: 1.5 {RATIO} (ref 1.1–2.2)
ALP SERPL-CCNC: 87 U/L (ref 40–129)
ALT SERPL-CCNC: 26 U/L (ref 10–40)
ANION GAP SERPL CALCULATED.3IONS-SCNC: 13 MMOL/L (ref 3–16)
AST SERPL-CCNC: 20 U/L (ref 15–37)
BASOPHILS # BLD: 0.1 K/UL (ref 0–0.2)
BASOPHILS NFR BLD: 0.8 %
BILIRUB SERPL-MCNC: 0.3 MG/DL (ref 0–1)
BUN SERPL-MCNC: 19 MG/DL (ref 7–20)
CALCIUM SERPL-MCNC: 8.9 MG/DL (ref 8.3–10.6)
CHLORIDE SERPL-SCNC: 104 MMOL/L (ref 99–110)
CO2 SERPL-SCNC: 23 MMOL/L (ref 21–32)
CREAT SERPL-MCNC: 1.5 MG/DL (ref 0.8–1.3)
DEPRECATED RDW RBC AUTO: 15.1 % (ref 12.4–15.4)
EOSINOPHIL # BLD: 0.2 K/UL (ref 0–0.6)
EOSINOPHIL NFR BLD: 1.7 %
GFR SERPLBLD CREATININE-BSD FMLA CKD-EPI: 49 ML/MIN/{1.73_M2}
GLUCOSE SERPL-MCNC: 127 MG/DL (ref 70–99)
HCT VFR BLD AUTO: 39.3 % (ref 40.5–52.5)
HGB BLD-MCNC: 13.3 G/DL (ref 13.5–17.5)
LYMPHOCYTES # BLD: 4.4 K/UL (ref 1–5.1)
LYMPHOCYTES NFR BLD: 38.4 %
MCH RBC QN AUTO: 29.1 PG (ref 26–34)
MCHC RBC AUTO-ENTMCNC: 33.8 G/DL (ref 31–36)
MCV RBC AUTO: 86 FL (ref 80–100)
MONOCYTES # BLD: 0.5 K/UL (ref 0–1.3)
MONOCYTES NFR BLD: 4 %
NEUTROPHILS # BLD: 6.2 K/UL (ref 1.7–7.7)
NEUTROPHILS NFR BLD: 55.1 %
PLATELET # BLD AUTO: 302 K/UL (ref 135–450)
PMV BLD AUTO: 8.6 FL (ref 5–10.5)
POTASSIUM SERPL-SCNC: 4.9 MMOL/L (ref 3.5–5.1)
PROT SERPL-MCNC: 7 G/DL (ref 6.4–8.2)
RBC # BLD AUTO: 4.57 M/UL (ref 4.2–5.9)
SODIUM SERPL-SCNC: 140 MMOL/L (ref 136–145)
WBC # BLD AUTO: 11.3 K/UL (ref 4–11)

## 2024-08-02 RX ORDER — FEBUXOSTAT 40 MG/1
40 TABLET, FILM COATED ORAL DAILY
Qty: 90 TABLET | Refills: 0 | Status: SHIPPED | OUTPATIENT
Start: 2024-08-02

## 2024-08-27 NOTE — TELEPHONE ENCOUNTER
Dr Huerta stopped this to change to Rapaflo in March. Can you check to see if Yohan really needs this medicine?

## 2024-08-28 RX ORDER — TAMSULOSIN HYDROCHLORIDE 0.4 MG/1
CAPSULE ORAL
Qty: 90 CAPSULE | Refills: 1 | Status: SHIPPED | OUTPATIENT
Start: 2024-08-28

## 2024-09-18 DIAGNOSIS — I10 ESSENTIAL HYPERTENSION: ICD-10-CM

## 2024-09-18 RX ORDER — LOSARTAN POTASSIUM AND HYDROCHLOROTHIAZIDE 12.5; 5 MG/1; MG/1
1 TABLET ORAL DAILY
Qty: 90 TABLET | Refills: 0 | Status: SHIPPED | OUTPATIENT
Start: 2024-09-18

## 2024-09-26 ENCOUNTER — OFFICE VISIT (OUTPATIENT)
Dept: PULMONOLOGY | Age: 74
End: 2024-09-26
Payer: MEDICARE

## 2024-09-26 VITALS
OXYGEN SATURATION: 96 % | BODY MASS INDEX: 34.21 KG/M2 | HEART RATE: 87 BPM | HEIGHT: 67 IN | SYSTOLIC BLOOD PRESSURE: 110 MMHG | TEMPERATURE: 97.8 F | DIASTOLIC BLOOD PRESSURE: 60 MMHG | WEIGHT: 218 LBS | RESPIRATION RATE: 18 BRPM

## 2024-09-26 DIAGNOSIS — J41.1 MUCOPURULENT CHRONIC BRONCHITIS (HCC): Primary | ICD-10-CM

## 2024-09-26 PROCEDURE — G8417 CALC BMI ABV UP PARAM F/U: HCPCS | Performed by: INTERNAL MEDICINE

## 2024-09-26 PROCEDURE — 3078F DIAST BP <80 MM HG: CPT | Performed by: INTERNAL MEDICINE

## 2024-09-26 PROCEDURE — 3017F COLORECTAL CA SCREEN DOC REV: CPT | Performed by: INTERNAL MEDICINE

## 2024-09-26 PROCEDURE — 4004F PT TOBACCO SCREEN RCVD TLK: CPT | Performed by: INTERNAL MEDICINE

## 2024-09-26 PROCEDURE — 3074F SYST BP LT 130 MM HG: CPT | Performed by: INTERNAL MEDICINE

## 2024-09-26 PROCEDURE — 1123F ACP DISCUSS/DSCN MKR DOCD: CPT | Performed by: INTERNAL MEDICINE

## 2024-09-26 PROCEDURE — 99214 OFFICE O/P EST MOD 30 MIN: CPT | Performed by: INTERNAL MEDICINE

## 2024-09-26 PROCEDURE — 3023F SPIROM DOC REV: CPT | Performed by: INTERNAL MEDICINE

## 2024-09-26 PROCEDURE — G8427 DOCREV CUR MEDS BY ELIG CLIN: HCPCS | Performed by: INTERNAL MEDICINE

## 2024-09-26 RX ORDER — AZITHROMYCIN 250 MG/1
TABLET, FILM COATED ORAL
Qty: 6 TABLET | Refills: 0 | Status: SHIPPED | OUTPATIENT
Start: 2024-09-26 | End: 2024-10-06

## 2024-09-26 RX ORDER — PREDNISONE 50 MG/1
50 TABLET ORAL DAILY
Qty: 5 TABLET | Refills: 0 | Status: SHIPPED | OUTPATIENT
Start: 2024-09-26 | End: 2024-10-01

## 2024-10-08 RX ORDER — FLUTICASONE FUROATE, UMECLIDINIUM BROMIDE AND VILANTEROL TRIFENATATE 100; 62.5; 25 UG/1; UG/1; UG/1
POWDER RESPIRATORY (INHALATION)
Qty: 1 EACH | Refills: 5 | Status: SHIPPED | OUTPATIENT
Start: 2024-10-08

## 2024-11-04 RX ORDER — LOVASTATIN 40 MG/1
40 TABLET ORAL DAILY
Qty: 90 TABLET | Refills: 1 | Status: SHIPPED | OUTPATIENT
Start: 2024-11-04

## 2024-12-18 DIAGNOSIS — I10 ESSENTIAL HYPERTENSION: ICD-10-CM

## 2024-12-18 RX ORDER — LOSARTAN POTASSIUM AND HYDROCHLOROTHIAZIDE 12.5; 5 MG/1; MG/1
1 TABLET ORAL DAILY
Qty: 90 TABLET | Refills: 0 | Status: SHIPPED | OUTPATIENT
Start: 2024-12-18

## 2025-02-17 ASSESSMENT — SLEEP AND FATIGUE QUESTIONNAIRES
HOW LIKELY ARE YOU TO NOD OFF OR FALL ASLEEP WHILE WATCHING TV: SLIGHT CHANCE OF DOZING
HOW LIKELY ARE YOU TO NOD OFF OR FALL ASLEEP WHILE LYING DOWN TO REST IN THE AFTERNOON WHEN CIRCUMSTANCES PERMIT: MODERATE CHANCE OF DOZING
HOW LIKELY ARE YOU TO NOD OFF OR FALL ASLEEP IN A CAR, WHILE STOPPED FOR A FEW MINUTES IN TRAFFIC: WOULD NEVER DOZE
HOW LIKELY ARE YOU TO NOD OFF OR FALL ASLEEP WHILE SITTING AND TALKING TO SOMEONE: WOULD NEVER DOZE
HOW LIKELY ARE YOU TO NOD OFF OR FALL ASLEEP WHILE SITTING AND TALKING TO SOMEONE: WOULD NEVER DOZE
HOW LIKELY ARE YOU TO NOD OFF OR FALL ASLEEP WHEN YOU ARE A PASSENGER IN A CAR FOR AN HOUR WITHOUT A BREAK: WOULD NEVER DOZE
HOW LIKELY ARE YOU TO NOD OFF OR FALL ASLEEP WHEN YOU ARE A PASSENGER IN A CAR FOR AN HOUR WITHOUT A BREAK: WOULD NEVER DOZE
ESS TOTAL SCORE: 3
HOW LIKELY ARE YOU TO NOD OFF OR FALL ASLEEP WHILE SITTING AND READING: WOULD NEVER DOZE
HOW LIKELY ARE YOU TO NOD OFF OR FALL ASLEEP WHILE SITTING INACTIVE IN A PUBLIC PLACE: WOULD NEVER DOZE
HOW LIKELY ARE YOU TO NOD OFF OR FALL ASLEEP WHILE WATCHING TV: SLIGHT CHANCE OF DOZING
HOW LIKELY ARE YOU TO NOD OFF OR FALL ASLEEP IN A CAR, WHILE STOPPED FOR A FEW MINUTES IN TRAFFIC: WOULD NEVER DOZE
HOW LIKELY ARE YOU TO NOD OFF OR FALL ASLEEP WHILE LYING DOWN TO REST IN THE AFTERNOON WHEN CIRCUMSTANCES PERMIT: MODERATE CHANCE OF DOZING
HOW LIKELY ARE YOU TO NOD OFF OR FALL ASLEEP WHILE SITTING QUIETLY AFTER LUNCH WITHOUT ALCOHOL: WOULD NEVER DOZE
HOW LIKELY ARE YOU TO NOD OFF OR FALL ASLEEP WHILE SITTING QUIETLY AFTER LUNCH WITHOUT ALCOHOL: WOULD NEVER DOZE
HOW LIKELY ARE YOU TO NOD OFF OR FALL ASLEEP WHILE SITTING AND READING: WOULD NEVER DOZE
HOW LIKELY ARE YOU TO NOD OFF OR FALL ASLEEP WHILE SITTING INACTIVE IN A PUBLIC PLACE: WOULD NEVER DOZE

## 2025-02-20 ENCOUNTER — OFFICE VISIT (OUTPATIENT)
Dept: SLEEP MEDICINE | Age: 75
End: 2025-02-20
Payer: MEDICARE

## 2025-02-20 VITALS
HEIGHT: 66 IN | WEIGHT: 221 LBS | RESPIRATION RATE: 18 BRPM | HEART RATE: 88 BPM | BODY MASS INDEX: 35.52 KG/M2 | SYSTOLIC BLOOD PRESSURE: 120 MMHG | DIASTOLIC BLOOD PRESSURE: 70 MMHG | TEMPERATURE: 97.9 F | OXYGEN SATURATION: 98 %

## 2025-02-20 DIAGNOSIS — G47.33 OSA ON CPAP: Primary | ICD-10-CM

## 2025-02-20 DIAGNOSIS — J44.9 OVERLAP SYNDROME OF OBSTRUCTIVE SLEEP APNEA AND CHRONIC OBSTRUCTIVE PULMONARY DISEASE (HCC): ICD-10-CM

## 2025-02-20 DIAGNOSIS — Z99.89 DEPENDENCE ON OTHER ENABLING MACHINES AND DEVICES: ICD-10-CM

## 2025-02-20 DIAGNOSIS — G47.33 OVERLAP SYNDROME OF OBSTRUCTIVE SLEEP APNEA AND CHRONIC OBSTRUCTIVE PULMONARY DISEASE (HCC): ICD-10-CM

## 2025-02-20 DIAGNOSIS — E66.811 OBESITY (BMI 30.0-34.9): ICD-10-CM

## 2025-02-20 DIAGNOSIS — I10 ESSENTIAL HYPERTENSION: ICD-10-CM

## 2025-02-20 PROCEDURE — 1160F RVW MEDS BY RX/DR IN RCRD: CPT | Performed by: PSYCHIATRY & NEUROLOGY

## 2025-02-20 PROCEDURE — 3017F COLORECTAL CA SCREEN DOC REV: CPT | Performed by: PSYCHIATRY & NEUROLOGY

## 2025-02-20 PROCEDURE — 99214 OFFICE O/P EST MOD 30 MIN: CPT | Performed by: PSYCHIATRY & NEUROLOGY

## 2025-02-20 PROCEDURE — 1159F MED LIST DOCD IN RCRD: CPT | Performed by: PSYCHIATRY & NEUROLOGY

## 2025-02-20 PROCEDURE — 3023F SPIROM DOC REV: CPT | Performed by: PSYCHIATRY & NEUROLOGY

## 2025-02-20 PROCEDURE — 3078F DIAST BP <80 MM HG: CPT | Performed by: PSYCHIATRY & NEUROLOGY

## 2025-02-20 PROCEDURE — G2211 COMPLEX E/M VISIT ADD ON: HCPCS | Performed by: PSYCHIATRY & NEUROLOGY

## 2025-02-20 PROCEDURE — G8427 DOCREV CUR MEDS BY ELIG CLIN: HCPCS | Performed by: PSYCHIATRY & NEUROLOGY

## 2025-02-20 PROCEDURE — 1123F ACP DISCUSS/DSCN MKR DOCD: CPT | Performed by: PSYCHIATRY & NEUROLOGY

## 2025-02-20 PROCEDURE — 3074F SYST BP LT 130 MM HG: CPT | Performed by: PSYCHIATRY & NEUROLOGY

## 2025-02-20 PROCEDURE — 4004F PT TOBACCO SCREEN RCVD TLK: CPT | Performed by: PSYCHIATRY & NEUROLOGY

## 2025-02-20 PROCEDURE — G8417 CALC BMI ABV UP PARAM F/U: HCPCS | Performed by: PSYCHIATRY & NEUROLOGY

## 2025-02-20 NOTE — PROGRESS NOTES
Damaso Stoner         : 1950  [] [facility]     [] [facility]      [] [facility]     [][facility]    [] [facility]  [x] [facility]  [] [facility]   [] [facility] [] Other:  Diagnosis: [x] STEPHEN (G47.33) [] CSA (G47.31) [] Apnea (G47.30)   Length of Need: [] 12 Months [x] 99 Months [] Other:    Machine (LIZZETH!):  [x] ResMed AirSense     Auto [] Other:       Humidifier: [x] Heated ()        [x] Water chamber replacement ()/ 1 per 6 months        Mask:  Please always start with the mask the patient used during the titraion   [x] Nasal () /1 per 3 months    [x] Patient choice -Size and fit mask    [] Dispense:     [x] Headgear () / 1 per 6 months        [x] Replacement Nasal Pillows ()/2 per month         Tubing: [x] Heated ()/1 per 3 months    [] Standard ()/1 per 3 months [] Other:           Filters: [x] Non-disposable ()/1 per 6 months     [x] Ultra-Fine, Disposable ()/2 per month        Miscellaneous: [x] Chin Strap ()/ 1 per 6 months [] O2 bleed-in:       LPM   [] Oximetry on CPAP/Bilevel []  Other:          Start Order Date: 25    MEDICAL JUSTIFICATION:  I, the undersigned, certify that the above prescribed supplies are medically necessary for this patient’s wellbeing.  In my opinion, the supplies are both reasonable and necessary in reference to accepted standards of medicalpractice in treatment of this patient’s condition.    Jewel Deluca MD      NPI: [id number]       Order Signed Date: 25    Electronically signed by Jewel Deluca MD on 2025 at 1:32 PM

## 2025-02-20 NOTE — PROGRESS NOTES
MD AMIE Deluca Board Certified in Sleep Medicine  Certified in Behavioral Sleep Medicine  Board Certified in Neurology [facility]  [address]   [address]  [place]  [address]  [phone]   [facility]  [address]  [address]  [place], [place] [address]                      [facility] [place] [facility]IC[facility]  [address] [address] [phone]    Subjective:     Patient ID: Damaso Stoner is a 74 y.o. male.    Chief Complaint   Patient presents with    [facility]    Sleep Apnea       HPI:        Damaso Stoner is a 74 y.o. male was seen today as a follow for obstructive sleep apnea.   Patient is using the PAP machine about 100% of the time, more than 4 hours a night about  100 %, in total average of 8:16 hours a night in last 90 days.  Currently on PAP at 7.9 cm (4-8), the AHI is only 0.2 events per hour at this pressure.  Patient improved regarding daytime sleepiness and fatigue, wakes up refreshed in the morning.  The Patient scored Novi Sleepiness Score: 3 on Novi Sleepiness Scale ( more than 10 is indicative of daytime sleepiness)   Patient has no problem with PAP pressure or mask., uses uses nasal pillow    BP, and COPD are stable. Has gained 3 pounds since last visit.   DOT/CDL - N/A      Previous Report(s)Reviewed: historical medical records         Social History     Socioeconomic History    Marital status:      Spouse name: Not on file    Number of children: Not on file    Years of education: Not on file    Highest education level: Not on file   Occupational History    Not on file   Tobacco Use    Smoking status: Every Day     Current packs/day: 0.50     Average packs/day: 0.5 packs/day for 58.1 years (29.1 ttl pk-yrs)     Types: Cigarettes     Start date: 1/1/1967     Passive exposure: Past    Smokeless tobacco: Never    Tobacco comments:

## 2025-02-25 RX ORDER — TAMSULOSIN HYDROCHLORIDE 0.4 MG/1
CAPSULE ORAL
Qty: 90 CAPSULE | Refills: 1 | Status: SHIPPED | OUTPATIENT
Start: 2025-02-25

## 2025-03-15 DIAGNOSIS — I10 ESSENTIAL HYPERTENSION: ICD-10-CM

## 2025-03-17 RX ORDER — LOSARTAN POTASSIUM AND HYDROCHLOROTHIAZIDE 12.5; 5 MG/1; MG/1
1 TABLET ORAL DAILY
Qty: 90 TABLET | Refills: 0 | Status: SHIPPED | OUTPATIENT
Start: 2025-03-17

## 2025-03-19 ENCOUNTER — OFFICE VISIT (OUTPATIENT)
Dept: PULMONOLOGY | Age: 75
End: 2025-03-19
Payer: MEDICARE

## 2025-03-19 VITALS
TEMPERATURE: 97.7 F | SYSTOLIC BLOOD PRESSURE: 122 MMHG | WEIGHT: 220 LBS | HEART RATE: 84 BPM | BODY MASS INDEX: 34.53 KG/M2 | OXYGEN SATURATION: 97 % | DIASTOLIC BLOOD PRESSURE: 68 MMHG | HEIGHT: 67 IN | RESPIRATION RATE: 18 BRPM

## 2025-03-19 DIAGNOSIS — J41.1 MUCOPURULENT CHRONIC BRONCHITIS (HCC): Primary | ICD-10-CM

## 2025-03-19 DIAGNOSIS — Z72.0 TOBACCO USE: ICD-10-CM

## 2025-03-19 PROCEDURE — 1123F ACP DISCUSS/DSCN MKR DOCD: CPT | Performed by: INTERNAL MEDICINE

## 2025-03-19 PROCEDURE — 3078F DIAST BP <80 MM HG: CPT | Performed by: INTERNAL MEDICINE

## 2025-03-19 PROCEDURE — G2211 COMPLEX E/M VISIT ADD ON: HCPCS | Performed by: INTERNAL MEDICINE

## 2025-03-19 PROCEDURE — 3074F SYST BP LT 130 MM HG: CPT | Performed by: INTERNAL MEDICINE

## 2025-03-19 PROCEDURE — 99214 OFFICE O/P EST MOD 30 MIN: CPT | Performed by: INTERNAL MEDICINE

## 2025-03-19 PROCEDURE — 3017F COLORECTAL CA SCREEN DOC REV: CPT | Performed by: INTERNAL MEDICINE

## 2025-03-19 PROCEDURE — 3023F SPIROM DOC REV: CPT | Performed by: INTERNAL MEDICINE

## 2025-03-19 PROCEDURE — G8427 DOCREV CUR MEDS BY ELIG CLIN: HCPCS | Performed by: INTERNAL MEDICINE

## 2025-03-19 PROCEDURE — G8417 CALC BMI ABV UP PARAM F/U: HCPCS | Performed by: INTERNAL MEDICINE

## 2025-03-19 PROCEDURE — 4004F PT TOBACCO SCREEN RCVD TLK: CPT | Performed by: INTERNAL MEDICINE

## 2025-03-19 RX ORDER — PREDNISONE 20 MG/1
20 TABLET ORAL 2 TIMES DAILY
Qty: 10 TABLET | Refills: 0 | Status: SHIPPED | OUTPATIENT
Start: 2025-03-19 | End: 2025-03-24

## 2025-03-19 NOTE — PROGRESS NOTES
adrenal masses likely adenomas requiring no further  follow-up.    RECOMMENDATIONS:  Unavailable      CXR PA/LAT: No results found for this or any previous visit.      CXR portable: Results for orders placed during the hospital encounter of 03/08/22    XR CHEST PORTABLE    Narrative  EXAMINATION: XR CHEST PORTABLE    HISTORY: Shortness of breath    COMPARISON: None.    TECHNIQUE: Portable chest    FINDINGS:    Poor inspiratory effort. The lung parenchyma is free of consolidation or  infiltrate. No pneumothorax or pleural effusion. The cardiac, mediastinal and  hilar contours are normal. The visualized osseous structures exhibit no gross  abnormality.      Report electronically signed by: Dr. Juan A Baker    Impression  No acute cardiopulmonary abnormality.       Pulmonary function testing  10/3/2022:  FEV1 1.45 L 54% predicted ratio 54% with evidence of air trapping no hyperinflation.  DLCO 75% predicted.  Assessment/Plan:            Problem List Items Addressed This Visit       Tobacco use     Other Visit Diagnoses         Mucopurulent chronic bronchitis (HCC)    -  Primary

## 2025-04-01 ENCOUNTER — PATIENT MESSAGE (OUTPATIENT)
Dept: PULMONOLOGY | Age: 75
End: 2025-04-01

## 2025-04-01 RX ORDER — AZITHROMYCIN 250 MG/1
TABLET, FILM COATED ORAL
Qty: 6 TABLET | Refills: 0 | Status: SHIPPED | OUTPATIENT
Start: 2025-04-01 | End: 2025-04-11

## 2025-04-08 RX ORDER — FLUTICASONE FUROATE, UMECLIDINIUM BROMIDE AND VILANTEROL TRIFENATATE 100; 62.5; 25 UG/1; UG/1; UG/1
POWDER RESPIRATORY (INHALATION)
Qty: 1 EACH | Refills: 5 | Status: SHIPPED | OUTPATIENT
Start: 2025-04-08

## 2025-04-11 ENCOUNTER — PATIENT MESSAGE (OUTPATIENT)
Dept: FAMILY MEDICINE CLINIC | Age: 75
End: 2025-04-11

## 2025-04-11 DIAGNOSIS — R73.03 PREDIABETES: ICD-10-CM

## 2025-04-11 DIAGNOSIS — E78.2 MIXED HYPERLIPIDEMIA: ICD-10-CM

## 2025-04-11 DIAGNOSIS — R50.9 FUO (FEVER OF UNKNOWN ORIGIN): ICD-10-CM

## 2025-04-11 DIAGNOSIS — R35.0 BENIGN PROSTATIC HYPERPLASIA WITH URINARY FREQUENCY: ICD-10-CM

## 2025-04-11 DIAGNOSIS — N40.1 BENIGN PROSTATIC HYPERPLASIA WITH URINARY FREQUENCY: ICD-10-CM

## 2025-04-14 DIAGNOSIS — R50.9 FUO (FEVER OF UNKNOWN ORIGIN): ICD-10-CM

## 2025-04-14 DIAGNOSIS — R35.0 BENIGN PROSTATIC HYPERPLASIA WITH URINARY FREQUENCY: ICD-10-CM

## 2025-04-14 DIAGNOSIS — N40.1 BENIGN PROSTATIC HYPERPLASIA WITH URINARY FREQUENCY: ICD-10-CM

## 2025-04-14 DIAGNOSIS — E78.2 MIXED HYPERLIPIDEMIA: ICD-10-CM

## 2025-04-14 DIAGNOSIS — R73.03 PREDIABETES: ICD-10-CM

## 2025-04-14 LAB
ALBUMIN SERPL-MCNC: 4.2 G/DL (ref 3.4–5)
ALBUMIN/GLOB SERPL: 1.6 {RATIO} (ref 1.1–2.2)
ALP SERPL-CCNC: 80 U/L (ref 40–129)
ALT SERPL-CCNC: 11 U/L (ref 10–40)
ANION GAP SERPL CALCULATED.3IONS-SCNC: 11 MMOL/L (ref 3–16)
AST SERPL-CCNC: 13 U/L (ref 15–37)
BASOPHILS # BLD: 0.1 K/UL (ref 0–0.2)
BASOPHILS NFR BLD: 0.8 %
BILIRUB SERPL-MCNC: 0.5 MG/DL (ref 0–1)
BUN SERPL-MCNC: 19 MG/DL (ref 7–20)
CALCIUM SERPL-MCNC: 9.4 MG/DL (ref 8.3–10.6)
CHLORIDE SERPL-SCNC: 103 MMOL/L (ref 99–110)
CHOLEST SERPL-MCNC: 155 MG/DL (ref 0–199)
CO2 SERPL-SCNC: 24 MMOL/L (ref 21–32)
CREAT SERPL-MCNC: 1.1 MG/DL (ref 0.8–1.3)
DEPRECATED RDW RBC AUTO: 14.6 % (ref 12.4–15.4)
EOSINOPHIL # BLD: 0.2 K/UL (ref 0–0.6)
EOSINOPHIL NFR BLD: 2 %
GFR SERPLBLD CREATININE-BSD FMLA CKD-EPI: 71 ML/MIN/{1.73_M2}
GLUCOSE SERPL-MCNC: 106 MG/DL (ref 70–99)
HCT VFR BLD AUTO: 42.4 % (ref 40.5–52.5)
HDLC SERPL-MCNC: 36 MG/DL (ref 40–60)
HGB BLD-MCNC: 14.3 G/DL (ref 13.5–17.5)
LDLC SERPL CALC-MCNC: 87 MG/DL
LYMPHOCYTES # BLD: 3.1 K/UL (ref 1–5.1)
LYMPHOCYTES NFR BLD: 34.1 %
MCH RBC QN AUTO: 29.1 PG (ref 26–34)
MCHC RBC AUTO-ENTMCNC: 33.8 G/DL (ref 31–36)
MCV RBC AUTO: 86.1 FL (ref 80–100)
MONOCYTES # BLD: 0.5 K/UL (ref 0–1.3)
MONOCYTES NFR BLD: 6.2 %
NEUTROPHILS # BLD: 5.1 K/UL (ref 1.7–7.7)
NEUTROPHILS NFR BLD: 56.9 %
PLATELET # BLD AUTO: 277 K/UL (ref 135–450)
PMV BLD AUTO: 9.2 FL (ref 5–10.5)
POTASSIUM SERPL-SCNC: 4.4 MMOL/L (ref 3.5–5.1)
PROT SERPL-MCNC: 6.8 G/DL (ref 6.4–8.2)
PSA SERPL DL<=0.01 NG/ML-MCNC: 4.71 NG/ML (ref 0–4)
RBC # BLD AUTO: 4.93 M/UL (ref 4.2–5.9)
SODIUM SERPL-SCNC: 138 MMOL/L (ref 136–145)
TRIGL SERPL-MCNC: 162 MG/DL (ref 0–150)
VLDLC SERPL CALC-MCNC: 32 MG/DL
WBC # BLD AUTO: 8.9 K/UL (ref 4–11)

## 2025-04-15 LAB
EST. AVERAGE GLUCOSE BLD GHB EST-MCNC: 111.2 MG/DL
HBA1C MFR BLD: 5.5 %

## 2025-04-16 ENCOUNTER — TELEPHONE (OUTPATIENT)
Dept: PULMONOLOGY | Age: 75
End: 2025-04-16

## 2025-04-16 RX ORDER — FLUTICASONE FUROATE, UMECLIDINIUM BROMIDE AND VILANTEROL TRIFENATATE 100; 62.5; 25 UG/1; UG/1; UG/1
POWDER RESPIRATORY (INHALATION)
Qty: 1 EACH | Refills: 5 | OUTPATIENT
Start: 2025-04-16

## 2025-04-25 SDOH — HEALTH STABILITY: PHYSICAL HEALTH: ON AVERAGE, HOW MANY MINUTES DO YOU ENGAGE IN EXERCISE AT THIS LEVEL?: 60 MIN

## 2025-04-25 SDOH — ECONOMIC STABILITY: FOOD INSECURITY: WITHIN THE PAST 12 MONTHS, YOU WORRIED THAT YOUR FOOD WOULD RUN OUT BEFORE YOU GOT MONEY TO BUY MORE.: NEVER TRUE

## 2025-04-25 SDOH — ECONOMIC STABILITY: INCOME INSECURITY: IN THE LAST 12 MONTHS, WAS THERE A TIME WHEN YOU WERE NOT ABLE TO PAY THE MORTGAGE OR RENT ON TIME?: NO

## 2025-04-25 SDOH — HEALTH STABILITY: PHYSICAL HEALTH: ON AVERAGE, HOW MANY DAYS PER WEEK DO YOU ENGAGE IN MODERATE TO STRENUOUS EXERCISE (LIKE A BRISK WALK)?: 1 DAY

## 2025-04-25 SDOH — ECONOMIC STABILITY: FOOD INSECURITY: WITHIN THE PAST 12 MONTHS, THE FOOD YOU BOUGHT JUST DIDN'T LAST AND YOU DIDN'T HAVE MONEY TO GET MORE.: NEVER TRUE

## 2025-04-25 SDOH — ECONOMIC STABILITY: TRANSPORTATION INSECURITY
IN THE PAST 12 MONTHS, HAS THE LACK OF TRANSPORTATION KEPT YOU FROM MEDICAL APPOINTMENTS OR FROM GETTING MEDICATIONS?: NO

## 2025-04-25 ASSESSMENT — LIFESTYLE VARIABLES
HOW MANY STANDARD DRINKS CONTAINING ALCOHOL DO YOU HAVE ON A TYPICAL DAY: 1 OR 2
HOW MANY STANDARD DRINKS CONTAINING ALCOHOL DO YOU HAVE ON A TYPICAL DAY: 1
HOW OFTEN DO YOU HAVE A DRINK CONTAINING ALCOHOL: 2
HOW OFTEN DO YOU HAVE SIX OR MORE DRINKS ON ONE OCCASION: 1
HOW OFTEN DO YOU HAVE A DRINK CONTAINING ALCOHOL: MONTHLY OR LESS

## 2025-04-25 ASSESSMENT — PATIENT HEALTH QUESTIONNAIRE - PHQ9
SUM OF ALL RESPONSES TO PHQ QUESTIONS 1-9: 0
2. FEELING DOWN, DEPRESSED OR HOPELESS: NOT AT ALL
SUM OF ALL RESPONSES TO PHQ QUESTIONS 1-9: 0
1. LITTLE INTEREST OR PLEASURE IN DOING THINGS: NOT AT ALL

## 2025-04-28 ENCOUNTER — OFFICE VISIT (OUTPATIENT)
Dept: FAMILY MEDICINE CLINIC | Age: 75
End: 2025-04-28
Payer: MEDICARE

## 2025-04-28 VITALS
OXYGEN SATURATION: 97 % | HEART RATE: 85 BPM | SYSTOLIC BLOOD PRESSURE: 118 MMHG | DIASTOLIC BLOOD PRESSURE: 62 MMHG | RESPIRATION RATE: 16 BRPM | BODY MASS INDEX: 34.37 KG/M2 | WEIGHT: 219 LBS | HEIGHT: 67 IN

## 2025-04-28 DIAGNOSIS — Z00.00 MEDICARE ANNUAL WELLNESS VISIT, SUBSEQUENT: Primary | ICD-10-CM

## 2025-04-28 DIAGNOSIS — R35.0 BENIGN PROSTATIC HYPERPLASIA WITH URINARY FREQUENCY: ICD-10-CM

## 2025-04-28 DIAGNOSIS — R97.20 ELEVATED PSA: ICD-10-CM

## 2025-04-28 DIAGNOSIS — N40.1 BENIGN PROSTATIC HYPERPLASIA WITH URINARY FREQUENCY: ICD-10-CM

## 2025-04-28 DIAGNOSIS — I10 ESSENTIAL HYPERTENSION: ICD-10-CM

## 2025-04-28 PROBLEM — J44.1 COPD EXACERBATION (HCC): Status: RESOLVED | Noted: 2022-03-09 | Resolved: 2025-04-28

## 2025-04-28 PROCEDURE — 1159F MED LIST DOCD IN RCRD: CPT | Performed by: FAMILY MEDICINE

## 2025-04-28 PROCEDURE — 3078F DIAST BP <80 MM HG: CPT | Performed by: FAMILY MEDICINE

## 2025-04-28 PROCEDURE — 1123F ACP DISCUSS/DSCN MKR DOCD: CPT | Performed by: FAMILY MEDICINE

## 2025-04-28 PROCEDURE — G0439 PPPS, SUBSEQ VISIT: HCPCS | Performed by: FAMILY MEDICINE

## 2025-04-28 PROCEDURE — 3017F COLORECTAL CA SCREEN DOC REV: CPT | Performed by: FAMILY MEDICINE

## 2025-04-28 PROCEDURE — G8427 DOCREV CUR MEDS BY ELIG CLIN: HCPCS | Performed by: FAMILY MEDICINE

## 2025-04-28 PROCEDURE — 4004F PT TOBACCO SCREEN RCVD TLK: CPT | Performed by: FAMILY MEDICINE

## 2025-04-28 PROCEDURE — 3074F SYST BP LT 130 MM HG: CPT | Performed by: FAMILY MEDICINE

## 2025-04-28 PROCEDURE — G8417 CALC BMI ABV UP PARAM F/U: HCPCS | Performed by: FAMILY MEDICINE

## 2025-04-28 PROCEDURE — 99213 OFFICE O/P EST LOW 20 MIN: CPT | Performed by: FAMILY MEDICINE

## 2025-04-28 RX ORDER — LOSARTAN POTASSIUM 50 MG/1
50 TABLET ORAL DAILY
Qty: 90 TABLET | Refills: 1 | Status: SHIPPED | OUTPATIENT
Start: 2025-04-28

## 2025-04-28 RX ORDER — FINASTERIDE 5 MG/1
5 TABLET, FILM COATED ORAL DAILY
Qty: 90 TABLET | Refills: 1 | Status: SHIPPED | OUTPATIENT
Start: 2025-04-28

## 2025-04-28 SDOH — ECONOMIC STABILITY: FOOD INSECURITY: WITHIN THE PAST 12 MONTHS, THE FOOD YOU BOUGHT JUST DIDN'T LAST AND YOU DIDN'T HAVE MONEY TO GET MORE.: NEVER TRUE

## 2025-04-28 SDOH — ECONOMIC STABILITY: FOOD INSECURITY: WITHIN THE PAST 12 MONTHS, YOU WORRIED THAT YOUR FOOD WOULD RUN OUT BEFORE YOU GOT MONEY TO BUY MORE.: NEVER TRUE

## 2025-04-28 NOTE — PROGRESS NOTES
Medicare Annual Wellness Visit    Damaso Stoner is here for Medicare AWV (Would like to discuss his prostate )    Assessment & Plan   Medicare annual wellness visit, subsequent  Essential hypertension  -     losartan (COZAAR) 50 MG tablet; Take 1 tablet by mouth daily, Disp-90 tablet, R-1Normal  Benign prostatic hyperplasia with urinary frequency  -     finasteride (PROSCAR) 5 MG tablet; Take 1 tablet by mouth daily, Disp-90 tablet, R-1Normal  Elevated PSA - saw Urology, normal MRI 2024, monitor PSA annually       Return in about 1 year (around 4/28/2026) for annual wellness.     Subjective   The following acute and/or chronic problems were also addressed today:    Elevated PSA  - he saw Urology and reports he had an MRI that was not of concern  - still with urinary flow issues and interested in further tx    Patient's complete Health Risk Assessment and screening values have been reviewed and are found in Flowsheets. The following problems were reviewed today and where indicated follow up appointments were made and/or referrals ordered.    Positive Risk Factor Screenings with Interventions:              Inactivity:  On average, how many days per week do you engage in moderate to strenuous exercise (like a brisk walk)?: (Patient-Rptd) 1 day (!) Abnormal  On average, how many minutes do you engage in exercise at this level?: (Patient-Rptd) 60 min  Interventions:  See AVS for additional education material     Abnormal BMI (obese):  Body mass index is 34.82 kg/m². (!) Abnormal  Interventions:  See AVS for additional education material          Vision Screen:  Do you have difficulty driving, watching TV, or doing any of your daily activities because of your eyesight?: (Patient-Rptd) No  Have you had an eye exam within the past year?: (!) (Patient-Rptd) No  Interventions:    See AVS      Advanced Directives:  Do you have a Living Will?: (!) (Patient-Rptd) No    Intervention:  has NO advanced directive - information

## 2025-04-28 NOTE — PATIENT INSTRUCTIONS
it done?  Lung cancer screening is done with a low-dose CT (computed tomography) scan. A CT scan uses X-rays, or radiation, to make detailed pictures of your body. Experts recommend that screening be done in medical centers that focus on finding and treating lung cancer.  Who is screening recommended for?  Lung cancer screening is recommended for people age 50 and older who are or were heavy smokers. That means people with a smoking history of at least 20 pack years. A pack year is a way to measure how heavy a smoker you are or were.  To figure out your pack years, multiply how many packs a day on average (assuming 20 cigarettes per pack) you have smoked by how many years you have smoked. For example:  If you smoked 1 pack a day for 20 years, that's 1 times 20. So you have a smoking history of 20 pack years.  If you smoked 2 packs a day for 10 years, that's 2 times 10. So you have a smoking history of 20 pack years.  Experts agree that screening is for people who have a high risk of lung cancer. But experts don't agree on what high risk means. Some say people age 50 or older with at least a 20-pack-year smoking history are high risk. Others say it's people age 55 or older with a 30-pack-year history.  To see if you could benefit from screening, first find out if you are at high risk for lung cancer. Your doctor can help you decide your lung cancer risk.  What are the risks of screening?  CT screening for lung cancer isn't perfect. It can show an abnormal result when it turns out there wasn't any cancer. This is called a false-positive result. This means you may need more tests to make sure you don't have cancer. These tests can be harmful and cause a lot of worry.  These tests may include more CT scans and invasive testing like a lung biopsy. In a biopsy, the doctor takes a sample of tissue from inside your lung so it can be looked at under a microscope. A biopsy is the only way to tell if you have lung cancer. If

## 2025-04-28 NOTE — TELEPHONE ENCOUNTER
The patient has been notified of this information and all questions answered.   
Yes - the patient is able to be screened

## 2025-05-01 NOTE — PROGRESS NOTES
Damaso Stoner (:  1950) is a 74 y.o. male,Established patient, here for evaluation of the following chief complaint(s):  Medicare AWV (Would like to discuss his prostate )      2. Essential hypertension  Well controlled on current regimen. No side effects from medications. Advise low salt diet and routine exercise  - losartan (COZAAR) 50 MG tablet; Take 1 tablet by mouth daily  Dispense: 90 tablet; Refill: 1    3. Benign prostatic hyperplasia with urinary frequency  Symptoms are not currently well-controlled.  Continue Flomax.  Add finasteride.  We discussed he can take months for this to take effect for symptoms.  Continue to monitor PSA  - finasteride (PROSCAR) 5 MG tablet; Take 1 tablet by mouth daily  Dispense: 90 tablet; Refill: 1    4. Elevated PSA - saw Urology, normal MRI , monitor PSA annually  Monitor annual PSA.  He saw urology and had a normal MRI per report      Return in about 1 year (around 2026) for annual wellness.    Subjective       HPI    BPH  Elevated PSA  - he saw Urology and reports he had an MRI that was not of concern  - still with urinary flow issues and interested in further tx    Review of Systems            Objective     /62   Pulse 85   Resp 16   Ht 1.689 m (5' 6.5\")   Wt 99.3 kg (219 lb)   SpO2 97%   BMI 34.82 kg/m²    Wt Readings from Last 3 Encounters:   25 99.3 kg (219 lb)   25 99.8 kg (220 lb)   25 100.2 kg (221 lb)     BP Readings from Last 3 Encounters:   25 118/62   25 122/68   25 120/70       Physical Exam           An electronic signature was used to authenticate this note.    --Dragan Huerta MD

## 2025-05-02 RX ORDER — LOVASTATIN 40 MG/1
40 TABLET ORAL DAILY
Qty: 90 TABLET | Refills: 1 | Status: SHIPPED | OUTPATIENT
Start: 2025-05-02

## 2025-05-03 RX ORDER — LOVASTATIN 40 MG/1
40 TABLET ORAL DAILY
Qty: 90 TABLET | Refills: 1 | OUTPATIENT
Start: 2025-05-03

## 2025-07-09 ENCOUNTER — TELEPHONE (OUTPATIENT)
Dept: PULMONOLOGY | Age: 75
End: 2025-07-09

## 2025-07-09 DIAGNOSIS — J41.1 MUCOPURULENT CHRONIC BRONCHITIS (HCC): Primary | ICD-10-CM

## 2025-07-09 RX ORDER — AZITHROMYCIN 250 MG/1
TABLET, FILM COATED ORAL
Qty: 6 TABLET | Refills: 0 | Status: SHIPPED | OUTPATIENT
Start: 2025-07-09 | End: 2025-07-19

## 2025-07-09 NOTE — TELEPHONE ENCOUNTER
Pt called and said he would like a z sandhya script sent in because he feels like he has bronchitis.     [facility]: [facility] [place]

## 2025-08-25 RX ORDER — TAMSULOSIN HYDROCHLORIDE 0.4 MG/1
0.4 CAPSULE ORAL DAILY
Qty: 90 CAPSULE | Refills: 1 | Status: SHIPPED | OUTPATIENT
Start: 2025-08-25